# Patient Record
Sex: MALE | Race: WHITE | NOT HISPANIC OR LATINO | Employment: FULL TIME | URBAN - METROPOLITAN AREA
[De-identification: names, ages, dates, MRNs, and addresses within clinical notes are randomized per-mention and may not be internally consistent; named-entity substitution may affect disease eponyms.]

---

## 2017-01-24 RX ORDER — LISINOPRIL 2.5 MG/1
TABLET ORAL
Qty: 30 TABLET | Refills: 3 | Status: SHIPPED | OUTPATIENT
Start: 2017-01-24 | End: 2017-06-09 | Stop reason: SDUPTHER

## 2017-03-13 RX ORDER — PRASUGREL HYDROCHLORIDE 10 MG/1
TABLET, COATED ORAL
Qty: 30 TABLET | Refills: 6 | Status: SHIPPED | OUTPATIENT
Start: 2017-03-13 | End: 2017-09-29 | Stop reason: SDUPTHER

## 2017-04-18 RX ORDER — METOPROLOL TARTRATE 25 MG/1
TABLET, FILM COATED ORAL
Qty: 60 TABLET | Refills: 6 | Status: SHIPPED | OUTPATIENT
Start: 2017-04-18 | End: 2017-10-26 | Stop reason: SDUPTHER

## 2017-06-09 RX ORDER — SIMVASTATIN 40 MG/1
TABLET, FILM COATED ORAL
Qty: 30 TABLET | Refills: 6 | Status: SHIPPED | OUTPATIENT
Start: 2017-06-09 | End: 2018-01-02 | Stop reason: SDUPTHER

## 2017-06-09 RX ORDER — LISINOPRIL 2.5 MG/1
TABLET ORAL
Qty: 30 TABLET | Refills: 6 | Status: SHIPPED | OUTPATIENT
Start: 2017-06-09 | End: 2018-01-02 | Stop reason: SDUPTHER

## 2017-09-29 RX ORDER — PRASUGREL HYDROCHLORIDE 10 MG/1
TABLET, COATED ORAL
Qty: 30 TABLET | Refills: 6 | Status: SHIPPED | OUTPATIENT
Start: 2017-09-29 | End: 2018-05-10 | Stop reason: SDUPTHER

## 2017-10-26 RX ORDER — METOPROLOL TARTRATE 25 MG/1
TABLET, FILM COATED ORAL
Qty: 60 TABLET | Refills: 6 | Status: SHIPPED | OUTPATIENT
Start: 2017-10-26 | End: 2018-05-16 | Stop reason: SDUPTHER

## 2018-01-02 RX ORDER — SIMVASTATIN 40 MG/1
TABLET, FILM COATED ORAL
Qty: 30 TABLET | Refills: 6 | Status: SHIPPED | OUTPATIENT
Start: 2018-01-02 | End: 2018-10-06 | Stop reason: SDUPTHER

## 2018-01-02 RX ORDER — LISINOPRIL 2.5 MG/1
TABLET ORAL
Qty: 30 TABLET | Refills: 6 | Status: SHIPPED | OUTPATIENT
Start: 2018-01-02 | End: 2018-10-31 | Stop reason: SDUPTHER

## 2018-04-29 ENCOUNTER — HOSPITAL ENCOUNTER (EMERGENCY)
Facility: HOSPITAL | Age: 53
Discharge: HOME | End: 2018-04-30
Attending: EMERGENCY MEDICINE
Payer: COMMERCIAL

## 2018-04-29 DIAGNOSIS — Z91.148 NONCOMPLIANCE WITH MEDICATION REGIMEN: ICD-10-CM

## 2018-04-29 DIAGNOSIS — I48.0 PAROXYSMAL ATRIAL FIBRILLATION WITH RVR: ICD-10-CM

## 2018-04-29 DIAGNOSIS — I48.0 EPISODIC ATRIAL FIBRILLATION: ICD-10-CM

## 2018-04-29 DIAGNOSIS — R00.2 PALPITATIONS: ICD-10-CM

## 2018-04-29 DIAGNOSIS — I48.91 FIRST DETECTED EPISODE OF ATRIAL FIBRILLATION: Primary | ICD-10-CM

## 2018-04-29 LAB
ALBUMIN SERPL BCP-MCNC: 4.6 G/DL
ALP SERPL-CCNC: 81 U/L
ALT SERPL W/O P-5'-P-CCNC: 26 U/L
ANION GAP SERPL CALC-SCNC: 12 MMOL/L
AST SERPL-CCNC: 24 U/L
BASOPHILS # BLD AUTO: 0.04 K/UL
BASOPHILS NFR BLD: 0.5 %
BILIRUB SERPL-MCNC: 0.8 MG/DL
BNP SERPL-MCNC: 23 PG/ML
BUN SERPL-MCNC: 18 MG/DL
CALCIUM SERPL-MCNC: 9.7 MG/DL
CHLORIDE SERPL-SCNC: 102 MMOL/L
CO2 SERPL-SCNC: 26 MMOL/L
CREAT SERPL-MCNC: 1 MG/DL
DIFFERENTIAL METHOD: ABNORMAL
EOSINOPHIL # BLD AUTO: 0.4 K/UL
EOSINOPHIL NFR BLD: 5.1 %
ERYTHROCYTE [DISTWIDTH] IN BLOOD BY AUTOMATED COUNT: 12.8 %
EST. GFR  (AFRICAN AMERICAN): >60 ML/MIN/1.73 M^2
EST. GFR  (NON AFRICAN AMERICAN): >60 ML/MIN/1.73 M^2
GLUCOSE SERPL-MCNC: 110 MG/DL
HCT VFR BLD AUTO: 44.8 %
HGB BLD-MCNC: 16.2 G/DL
LYMPHOCYTES # BLD AUTO: 3.5 K/UL
LYMPHOCYTES NFR BLD: 44.1 %
MCH RBC QN AUTO: 30.4 PG
MCHC RBC AUTO-ENTMCNC: 36.2 G/DL
MCV RBC AUTO: 84 FL
MONOCYTES # BLD AUTO: 0.6 K/UL
MONOCYTES NFR BLD: 6.8 %
NEUTROPHILS # BLD AUTO: 3.5 K/UL
NEUTROPHILS NFR BLD: 43.5 %
PLATELET # BLD AUTO: 207 K/UL
PMV BLD AUTO: 10.8 FL
POTASSIUM SERPL-SCNC: 3.8 MMOL/L
PROT SERPL-MCNC: 7.6 G/DL
RBC # BLD AUTO: 5.33 M/UL
SODIUM SERPL-SCNC: 140 MMOL/L
TROPONIN I SERPL DL<=0.01 NG/ML-MCNC: 0.01 NG/ML
WBC # BLD AUTO: 8.03 K/UL

## 2018-04-29 PROCEDURE — 84484 ASSAY OF TROPONIN QUANT: CPT

## 2018-04-29 PROCEDURE — 93005 ELECTROCARDIOGRAM TRACING: CPT

## 2018-04-29 PROCEDURE — 83880 ASSAY OF NATRIURETIC PEPTIDE: CPT

## 2018-04-29 PROCEDURE — 85025 COMPLETE CBC W/AUTO DIFF WBC: CPT

## 2018-04-29 PROCEDURE — 93010 ELECTROCARDIOGRAM REPORT: CPT | Mod: ,,, | Performed by: INTERNAL MEDICINE

## 2018-04-29 PROCEDURE — 99284 EMERGENCY DEPT VISIT MOD MDM: CPT | Mod: 25

## 2018-04-29 PROCEDURE — 25000003 PHARM REV CODE 250: Performed by: EMERGENCY MEDICINE

## 2018-04-29 PROCEDURE — 96374 THER/PROPH/DIAG INJ IV PUSH: CPT

## 2018-04-29 PROCEDURE — 80053 COMPREHEN METABOLIC PANEL: CPT

## 2018-04-29 RX ORDER — METOPROLOL TARTRATE 1 MG/ML
5 INJECTION, SOLUTION INTRAVENOUS
Status: COMPLETED | OUTPATIENT
Start: 2018-04-29 | End: 2018-04-29

## 2018-04-29 RX ORDER — ASPIRIN 325 MG
325 TABLET ORAL
Status: DISCONTINUED | OUTPATIENT
Start: 2018-04-29 | End: 2018-04-30 | Stop reason: HOSPADM

## 2018-04-29 RX ORDER — DILTIAZEM HYDROCHLORIDE 5 MG/ML
10 INJECTION INTRAVENOUS
Status: DISCONTINUED | OUTPATIENT
Start: 2018-04-29 | End: 2018-04-29

## 2018-04-29 RX ADMIN — METOPROLOL TARTRATE 5 MG: 5 INJECTION, SOLUTION INTRAVENOUS at 10:04

## 2018-04-30 VITALS
WEIGHT: 179.88 LBS | TEMPERATURE: 98 F | HEART RATE: 70 BPM | SYSTOLIC BLOOD PRESSURE: 106 MMHG | RESPIRATION RATE: 18 BRPM | HEIGHT: 66 IN | BODY MASS INDEX: 28.91 KG/M2 | OXYGEN SATURATION: 95 % | DIASTOLIC BLOOD PRESSURE: 59 MMHG

## 2018-04-30 PROBLEM — I48.0 PAROXYSMAL ATRIAL FIBRILLATION WITH RVR: Status: ACTIVE | Noted: 2018-04-30

## 2018-04-30 PROBLEM — I48.91: Status: ACTIVE | Noted: 2018-04-30

## 2018-04-30 PROBLEM — R00.2 PALPITATIONS: Status: ACTIVE | Noted: 2018-04-30

## 2018-04-30 PROBLEM — Z91.148 NONCOMPLIANCE WITH MEDICATION REGIMEN: Status: ACTIVE | Noted: 2018-04-30

## 2018-04-30 NOTE — ED PROVIDER NOTES
SCRIBE #1 NOTE: I, Alena Gupta, am scribing for, and in the presence of, Darian Arana Jr., MD. I have scribed the entire note.      History      Chief Complaint   Patient presents with    Palpitations       Review of patient's allergies indicates:  No Known Allergies     HPI   HPI    4/29/2018, 10:30 PM   History obtained from the patient      History of Present Illness: Solomon Joseph is a 52 y.o. male patient with PMHx of HTN, CAD, and MI who presents to the Emergency Department for palpitations which onset gradually PTA. Patient reports missing a dose of Metoprolol this morning. Symptoms are constant and moderate in severity. No mitigating or exacerbating factors reported. No associated sxs included. Prior tx includes aspirin x3. Patient denies any fever, chills, CP, SOB, diaphoresis, extremity weakness/numbness, leg swelling, dizziness, cough, N/V, and all other sxs at this time. Patient reports a couple episodes of palpitations over the last few months, but sxs only lasted about 5 seconds and then would resolve after coughing. Patient states he has not previously been diagnosed with Afib. Patient reports previously having stents placed by Dr. Lemus. No further complaints or concerns at this time.       Arrival mode: Personal vehicle      PCP: Primary Doctor No       Past Medical History:  Past Medical History:   Diagnosis Date    Coronary artery disease     Hypertension        Past Surgical History:  Past Surgical History:   Procedure Laterality Date    CORONARY STENT PLACEMENT           Family History:  Family History   Problem Relation Age of Onset    Heart disease Father        Social History:  Social History     Social History Main Topics    Smoking status: Former Smoker    Smokeless tobacco: Never Used    Alcohol use No    Drug use: No    Sexual activity: Unknown       ROS   Review of Systems   Constitutional: Negative for diaphoresis and fever.   HENT: Negative for sore throat.    Respiratory:  "Negative for cough and shortness of breath.    Cardiovascular: Positive for palpitations. Negative for chest pain and leg swelling.   Gastrointestinal: Negative for nausea and vomiting.   Genitourinary: Negative for dysuria.   Musculoskeletal: Negative for back pain.   Skin: Negative for rash.   Neurological: Negative for dizziness, weakness and numbness.   Hematological: Does not bruise/bleed easily.   All other systems reviewed and are negative.      Physical Exam      Initial Vitals [04/29/18 2220]   BP Pulse Resp Temp SpO2   (!) 180/85 (!) 141 18 98 °F (36.7 °C) 96 %      MAP       116.67          Physical Exam  Nursing Notes and Vital Signs Reviewed.  Constitutional: Patient is in no acute distress. Well-developed and well-nourished.  Head: Atraumatic. Normocephalic.  Eyes: PERRL. EOM intact. Conjunctivae are not pale. No scleral icterus.  ENT: Mucous membranes are moist. Oropharynx is clear and symmetric.    Neck: Supple. Full ROM. No lymphadenopathy.  Cardiovascular: Tachycardic. Irregularly irregular rhythm. No murmurs, rubs, or gallops. Distal pulses are 2+ and symmetric.  Pulmonary/Chest: No respiratory distress. Clear to auscultation bilaterally. No wheezing or rales.  Abdominal: Soft and non-distended.  There is no tenderness.  No rebound, guarding, or rigidity. Good bowel sounds.  Genitourinary: No CVA tenderness  Musculoskeletal: Moves all extremities. No obvious deformities. No edema. No calf tenderness.  Skin: Warm and dry.  Neurological:  Alert, awake, and appropriate.  Normal speech.  No acute focal neurological deficits are appreciated.  Psychiatric: Normal affect. Good eye contact. Appropriate in content.    ED Course    Procedures  ED Vital Signs:  Vitals:    04/29/18 2220 04/29/18 2239 04/29/18 2302 04/29/18 2309   BP: (!) 180/85  (!) 148/83    Pulse: (!) 141 (!) 145 93 80   Resp: 18  (!) 23    Temp: 98 °F (36.7 °C)      SpO2: 96%  97%    Weight: 81.6 kg (179 lb 14.4 oz)      Height: 5' 6" " (1.676 m)       04/29/18 2318 04/30/18 0017   BP: 129/66 (!) 106/59   Pulse: 80 70   Resp: (!) 23 18   Temp:     SpO2: 96% 95%   Weight:     Height:         Abnormal Lab Results:  Labs Reviewed   CBC W/ AUTO DIFFERENTIAL - Abnormal; Notable for the following:        Result Value    MCHC 36.2 (*)     All other components within normal limits   COMPREHENSIVE METABOLIC PANEL   TROPONIN I   B-TYPE NATRIURETIC PEPTIDE        All Lab Results:  Results for orders placed or performed during the hospital encounter of 04/29/18   CBC auto differential   Result Value Ref Range    WBC 8.03 3.90 - 12.70 K/uL    RBC 5.33 4.60 - 6.20 M/uL    Hemoglobin 16.2 14.0 - 18.0 g/dL    Hematocrit 44.8 40.0 - 54.0 %    MCV 84 82 - 98 fL    MCH 30.4 27.0 - 31.0 pg    MCHC 36.2 (H) 32.0 - 36.0 g/dL    RDW 12.8 11.5 - 14.5 %    Platelets 207 150 - 350 K/uL    MPV 10.8 9.2 - 12.9 fL    Gran # (ANC) 3.5 1.8 - 7.7 K/uL    Lymph # 3.5 1.0 - 4.8 K/uL    Mono # 0.6 0.3 - 1.0 K/uL    Eos # 0.4 0.0 - 0.5 K/uL    Baso # 0.04 0.00 - 0.20 K/uL    Gran% 43.5 38.0 - 73.0 %    Lymph% 44.1 18.0 - 48.0 %    Mono% 6.8 4.0 - 15.0 %    Eosinophil% 5.1 0.0 - 8.0 %    Basophil% 0.5 0.0 - 1.9 %    Differential Method Automated    Comprehensive metabolic panel   Result Value Ref Range    Sodium 140 136 - 145 mmol/L    Potassium 3.8 3.5 - 5.1 mmol/L    Chloride 102 95 - 110 mmol/L    CO2 26 23 - 29 mmol/L    Glucose 110 70 - 110 mg/dL    BUN, Bld 18 6 - 20 mg/dL    Creatinine 1.0 0.5 - 1.4 mg/dL    Calcium 9.7 8.7 - 10.5 mg/dL    Total Protein 7.6 6.0 - 8.4 g/dL    Albumin 4.6 3.5 - 5.2 g/dL    Total Bilirubin 0.8 0.1 - 1.0 mg/dL    Alkaline Phosphatase 81 55 - 135 U/L    AST 24 10 - 40 U/L    ALT 26 10 - 44 U/L    Anion Gap 12 8 - 16 mmol/L    eGFR if African American >60 >60 mL/min/1.73 m^2    eGFR if non African American >60 >60 mL/min/1.73 m^2   Troponin I #1   Result Value Ref Range    Troponin I 0.013 0.000 - 0.026 ng/mL   B-Type natriuretic peptide (BNP)    Result Value Ref Range    BNP 23 0 - 99 pg/mL       Imaging Results:  Imaging Results          X-Ray Chest AP Portable (Final result)  Result time 04/29/18 22:59:07    Final result by Theresa De La Garza MD (Timothy) (04/29/18 22:59:07)                 Impression:     Normal sized heart.Clear lungs.         Electronically signed by: THERESA DE LA GARZA MD  Date:     04/29/18  Time:    22:59              Narrative:    Chest, 1 view.    Clinical History: Cardiac palpitations                             The EKG was ordered, reviewed, and independently interpreted by the ED provider.  Interpretation time: 2227  Rate: 151 BPM  Rhythm: atrial fibrillation with rapid ventricular response  Interpretation: Septal infarct, age undetermined. Abnormal ECG. No STEMI.    The EKG was ordered, reviewed, and independently interpreted by the ED provider.  Interpretation time: 2300  Rate: 90 BPM  Rhythm: normal sinus rhythm  Interpretation: Normal ECG. No STEMI.         The Emergency Provider reviewed the vital signs and test results, which are outlined above.    ED Discussion     11:05 PM: Per nurse, patient spontaneously cardioverted.  Will get repeat EKG.    11:49 PM: Re-evaluated pt. Pt is resting comfortably and is in no acute distress.  D/w pt all pertinent results. D/w pt any concerns expressed at this time. Answered all questions. Pt expresses understanding at this time.    11:57 PM: Dr. Arana discussed the pt's case with Dr. Fay (Cardiology) who recommends agrees with plan of treatment. Dr. Fay recommends continue Metoprolol, aspirin, as well as other prescribed medications, and agrees with outpatient f/u in clinic.    12:01 AM: Reassessed pt at this time. Discussed with pt all pertinent ED information and results. Discussed pt dx and plan of tx. Gave pt all f/u and return to the ED instructions. All questions and concerns were addressed at this time. Pt expresses understanding of information and instructions, and is  comfortable with plan to discharge. Pt is stable for discharge.    I discussed with patient and/or family/caretaker that evaluation in the ED does not suggest any emergent or life threatening medical conditions requiring immediate intervention beyond what was provided in the ED, and I believe patient is safe for discharge.  Regardless, an unremarkable evaluation in the ED does not preclude the development or presence of a serious of life threatening condition. As such, patient was instructed to return immediately for any worsening or change in current symptoms.    Regarding ARRHYTHMIAS, I discussed causes of arrhythmias with patient including: blood chemistry imbalances; cardiomyopathy; heart failure; overactive thyroid gland; previous heart attack; heart disease; and the use of certain medications or substances (amphetamines, caffeine, cocaine, beta blockers, psychotropics, and sympathomimetics).  Patient educated on various symptoms, if present, such as chest pain, fainting, palpitations, light-headedness, dizziness, SOB, and diaphoresis.  Informed patient of possible complications (angina, MI, heart failure, stroke, and sudden death) associated with condition and importance on complying with medical plan and medication therapy. Patient advised to call primary care provider or return to ED if they develop any of the symptoms of a possible arrhythmia or if any symptoms associated with arrhythmia worsen or do not improve with treatment. For prevention, patient educated on steps to prevent coronary artery disease such as: eating a well-balanced, low-fat diet; exercising regularly; not smoking; drinking in moderation; and taking all medications as prescribed.    Regarding PALPITATIONS, I explained to patient things that may cause or worsen palpitations, such as: anxiety, stress, or lack of sleep; exercise; pregnancy; certain medical conditions (thyroid problems, low blood sugar, breathing problems, fever, or anemia;  dehydration; blood loss; shock; heart disease; medicines (diet pills, herbal supplements, amphetamines); cocaine; caffeine; and nicotine.  Advised patient to take medications as prescribed, always keep current list of medications on person, eat heart healthy meals, exercise regularly, and maintain/obtain healthy weight. I instructed patient to report to emergency department if they experience any dizziness, confusion, light-headedness, dyspnea, syncope, or chest discomfort.     ED Medication(s):  Medications   metoprolol injection 5 mg (5 mg Intravenous Given 4/29/18 2244)       Discharge Medication List as of 4/30/2018 12:06 AM          Follow-up Information     [facility]. Schedule an appointment as soon as possible for a visit in 1 week.    Specialty:  Internal Medicine  Contact information:  [address] [facility] [id number] [phone]  Additional information:  (off [employer]) 1st floor           [facility] Card[facility]. Schedule an appointment as soon as possible for a visit in 1 week.    Specialty:  Cardiology  Contact information:  [address] [facility] [id number] [phone]  Additional information:  (off [employer]) 3rd floor           [facility] - .    Specialty:  Emergency Medicine  Why:  As needed, If symptoms worsen  Contact information:  [address] [facility] [address] [phone]                   Medical Decision Making    Medical Decision Making:   Clinical Tests:   Lab Tests: Ordered and Reviewed  Radiological Study: Ordered and Reviewed  Medical Tests: Ordered and Reviewed           Scribe Attestation:   Scribe #1: I performed the above scribed service and the documentation accurately describes the services I performed. I attest to the accuracy of the note.    Attending:   Physician Attestation Statement for Scribe #1: I, Darian Arana Jr., MD, personally performed the services described in this  documentation, as scribed by Alena Gupta, in my presence, and it is both accurate and complete.          Clinical Impression       ICD-10-CM ICD-9-CM   1. First detected episode of atrial fibrillation I48.91 427.31   2. Palpitations R00.2 785.1   3. Episodic atrial fibrillation I48.0 427.31   4. Paroxysmal atrial fibrillation with RVR I48.0 427.31   5. Noncompliance with medication regimen Z91.14 V15.81       Disposition:   Disposition: Discharged  Condition: Stable         Darian Arana Jr., MD  05/01/18 0057

## 2018-04-30 NOTE — ED NOTES
Patient in Normal sinus rhythm on monitor. Held IV metoprolol. Patient stated he took 3 aspirin before leaving home to come to the er. Dr Arana notified.

## 2018-04-30 NOTE — DISCHARGE INSTRUCTIONS
Regarding ARRHYTHMIAS, I discussed causes of arrhythmias with patient including: blood chemistry imbalances; cardiomyopathy; heart failure; overactive thyroid gland; previous heart attack; heart disease; and the use of certain medications or substances (amphetamines, caffeine, cocaine, beta blockers, psychotropic?s, and sympathomimetics).  Patient educated on various symptoms, if present, such as chest pain, fainting, palpitations, light-headedness, dizziness, SOB, and diaphoresis.  Informed patient of possible complications (angina, MI, heart failure, stroke, and sudden death) associated with condition and importance on complying with medical plan and medication therapy. Patient advised to call primary care provider or return to ED if they develop any of the symptoms of a possible arrhythmia or if any symptoms associated with arrhythmia worsen or do not improve with treatment. For prevention, patient educated on steps to prevent coronary artery disease such as: eating a well-balanced, low-fat diet; exercising regularly; not smoking; drinking in moderation; and taking all medications as prescribed.

## 2018-05-11 RX ORDER — PRASUGREL 10 MG/1
TABLET, FILM COATED ORAL
Qty: 30 TABLET | Refills: 6 | Status: SHIPPED | OUTPATIENT
Start: 2018-05-11 | End: 2019-01-16 | Stop reason: SDUPTHER

## 2018-05-16 RX ORDER — METOPROLOL TARTRATE 25 MG/1
TABLET, FILM COATED ORAL
Qty: 60 TABLET | Refills: 0 | Status: SHIPPED | OUTPATIENT
Start: 2018-05-16 | End: 2018-09-13 | Stop reason: SDUPTHER

## 2018-09-13 RX ORDER — METOPROLOL TARTRATE 25 MG/1
TABLET, FILM COATED ORAL
Qty: 60 TABLET | Refills: 6 | Status: SHIPPED | OUTPATIENT
Start: 2018-09-13 | End: 2019-08-18 | Stop reason: SDUPTHER

## 2018-10-06 RX ORDER — SIMVASTATIN 40 MG/1
TABLET, FILM COATED ORAL
Qty: 30 TABLET | Refills: 6 | Status: SHIPPED | OUTPATIENT
Start: 2018-10-06 | End: 2019-10-28 | Stop reason: SDUPTHER

## 2018-10-31 RX ORDER — LISINOPRIL 2.5 MG/1
TABLET ORAL
Qty: 30 TABLET | Refills: 6 | Status: SHIPPED | OUTPATIENT
Start: 2018-10-31 | End: 2019-05-28 | Stop reason: SDUPTHER

## 2019-01-16 RX ORDER — PRASUGREL 10 MG/1
TABLET, FILM COATED ORAL
Qty: 30 TABLET | Refills: 6 | Status: ON HOLD | OUTPATIENT
Start: 2019-01-16 | End: 2023-04-05

## 2019-01-22 LAB — CRC RECOMMENDATION EXT: NORMAL

## 2019-05-28 RX ORDER — LISINOPRIL 2.5 MG/1
TABLET ORAL
Qty: 30 TABLET | Refills: 0 | Status: SHIPPED | OUTPATIENT
Start: 2019-05-28 | End: 2019-07-06 | Stop reason: SDUPTHER

## 2019-07-06 RX ORDER — LISINOPRIL 2.5 MG/1
TABLET ORAL
Qty: 30 TABLET | Refills: 0 | Status: ON HOLD | OUTPATIENT
Start: 2019-07-06 | End: 2023-04-05

## 2019-08-18 RX ORDER — METOPROLOL TARTRATE 25 MG/1
TABLET, FILM COATED ORAL
Qty: 60 TABLET | Refills: 6 | Status: SHIPPED | OUTPATIENT
Start: 2019-08-18 | End: 2023-04-04 | Stop reason: CLARIF

## 2019-10-28 RX ORDER — SIMVASTATIN 40 MG/1
TABLET, FILM COATED ORAL
Qty: 30 TABLET | Refills: 6 | Status: ON HOLD | OUTPATIENT
Start: 2019-10-28 | End: 2023-04-05 | Stop reason: HOSPADM

## 2020-01-10 ENCOUNTER — TELEPHONE (OUTPATIENT)
Dept: CARDIOLOGY | Facility: CLINIC | Age: 55
End: 2020-01-10

## 2020-01-10 NOTE — TELEPHONE ENCOUNTER
----- Message from Koko Seay sent at 1/10/2020 10:23 AM CST -----  Contact: Pt's wife-Tia Joseph   Type:  RX Refill Request    Who Called: Pt's wife-Tia Joseph   Refill or New Rx: refill   RX Name and Strength: prasugrel (EFFIENT) 10 mg  How is the patient currently taking it? (ex. 1XDay): once daily   Is this a 30 day or 90 day RX: 30  Preferred Pharmacy with phone number:   [id number] - [place], [place] - [address] AT [facility] OF [address]  [address] [facility] [address]  Phone: [phone] Fax: [phone]  Local or Mail Order: local   Ordering Provider:seth   Would the patient rather a call back or a response via [facility]? Call back   Best Call Back Number: [phone]  Additional Information: n/a

## 2020-01-10 NOTE — TELEPHONE ENCOUNTER
----- Message from Koko Seay sent at 1/10/2020 10:27 AM CST -----  Contact: Pt's wife-   Type:  Sooner Apoointment Request    Caller is requesting a sooner appointment.  Caller declined first available appointment listed below.  Caller will not accept being placed on the waitlist and is requesting a message be sent to doctor.  Name of Caller:Pt's wife-Mrs. Joseph   When is the first available appointment? n/a  Symptoms: HOSP FU   Would the patient rather a call back or a response via [facility]? Call back   Best Call Back Number: [phone]  Additional Information: n/a

## 2020-04-16 DIAGNOSIS — R00.2 PALPITATIONS: Primary | ICD-10-CM

## 2020-04-16 RX ORDER — METOPROLOL TARTRATE 25 MG/1
25 TABLET, FILM COATED ORAL 2 TIMES DAILY
Qty: 60 TABLET | Refills: 6 | Status: SHIPPED | OUTPATIENT
Start: 2020-04-16 | End: 2021-04-22

## 2023-04-04 ENCOUNTER — HOSPITAL ENCOUNTER (INPATIENT)
Facility: HOSPITAL | Age: 58
LOS: 1 days | Discharge: HOME | DRG: 247 | End: 2023-04-05
Attending: EMERGENCY MEDICINE | Admitting: INTERNAL MEDICINE
Payer: COMMERCIAL

## 2023-04-04 DIAGNOSIS — R07.9 CHEST PAIN, UNSPECIFIED TYPE: ICD-10-CM

## 2023-04-04 DIAGNOSIS — I25.10 CORONARY ARTERY DISEASE, UNSPECIFIED VESSEL OR LESION TYPE, UNSPECIFIED WHETHER ANGINA PRESENT, UNSPECIFIED WHETHER NATIVE OR TRANSPLANTED HEART: ICD-10-CM

## 2023-04-04 DIAGNOSIS — I25.10 CAD (CORONARY ARTERY DISEASE): ICD-10-CM

## 2023-04-04 DIAGNOSIS — I21.4 NSTEMI (NON-ST ELEVATED MYOCARDIAL INFARCTION): Primary | ICD-10-CM

## 2023-04-04 DIAGNOSIS — R79.89 ELEVATED TROPONIN: ICD-10-CM

## 2023-04-04 DIAGNOSIS — R07.9 CHEST PAIN: ICD-10-CM

## 2023-04-04 PROBLEM — R03.0 ELEVATED BLOOD PRESSURE READING: Status: ACTIVE | Noted: 2023-04-04

## 2023-04-04 LAB
ALBUMIN SERPL BCP-MCNC: 4 G/DL (ref 3.5–5.2)
ALBUMIN SERPL BCP-MCNC: 4.1 G/DL (ref 3.5–5.2)
ALP SERPL-CCNC: 69 U/L (ref 55–135)
ALP SERPL-CCNC: 69 U/L (ref 55–135)
ALT SERPL W/O P-5'-P-CCNC: 25 U/L (ref 10–44)
ALT SERPL W/O P-5'-P-CCNC: 25 U/L (ref 10–44)
ANION GAP SERPL CALC-SCNC: 10 MMOL/L (ref 8–16)
ANION GAP SERPL CALC-SCNC: 11 MMOL/L (ref 8–16)
AORTIC ROOT ANNULUS: 3.34 CM
APTT PPP: 28.5 SEC (ref 21–32)
ASCENDING AORTA: 3.58 CM
AST SERPL-CCNC: 21 U/L (ref 10–40)
AST SERPL-CCNC: 23 U/L (ref 10–40)
AV INDEX (PROSTH): 0.87
AV MEAN GRADIENT: 2 MMHG
AV PEAK GRADIENT: 4 MMHG
AV VALVE AREA: 4.1 CM2
AV VELOCITY RATIO: 0.86
BASOPHILS # BLD AUTO: 0.05 K/UL (ref 0–0.2)
BASOPHILS # BLD AUTO: 0.06 K/UL (ref 0–0.2)
BASOPHILS NFR BLD: 0.9 % (ref 0–1.9)
BASOPHILS NFR BLD: 1.2 % (ref 0–1.9)
BILIRUB SERPL-MCNC: 0.3 MG/DL (ref 0.1–1)
BILIRUB SERPL-MCNC: 0.3 MG/DL (ref 0.1–1)
BNP SERPL-MCNC: 32 PG/ML (ref 0–99)
BSA FOR ECHO PROCEDURE: 1.94 M2
BUN SERPL-MCNC: 15 MG/DL (ref 6–20)
BUN SERPL-MCNC: 17 MG/DL (ref 6–20)
CALCIUM SERPL-MCNC: 8.6 MG/DL (ref 8.7–10.5)
CALCIUM SERPL-MCNC: 8.7 MG/DL (ref 8.7–10.5)
CATH EF QUANTITATIVE: 60 %
CHLORIDE SERPL-SCNC: 105 MMOL/L (ref 95–110)
CHLORIDE SERPL-SCNC: 106 MMOL/L (ref 95–110)
CK SERPL-CCNC: 132 U/L (ref 20–200)
CO2 SERPL-SCNC: 22 MMOL/L (ref 23–29)
CO2 SERPL-SCNC: 23 MMOL/L (ref 23–29)
CREAT SERPL-MCNC: 0.8 MG/DL (ref 0.5–1.4)
CREAT SERPL-MCNC: 0.8 MG/DL (ref 0.5–1.4)
CV ECHO LV RWT: 0.49 CM
DIFFERENTIAL METHOD: NORMAL
DIFFERENTIAL METHOD: NORMAL
DOP CALC AO PEAK VEL: 0.94 M/S
DOP CALC AO VTI: 23.2 CM
DOP CALC LVOT AREA: 4.7 CM2
DOP CALC LVOT DIAMETER: 2.45 CM
DOP CALC LVOT PEAK VEL: 0.81 M/S
DOP CALC LVOT STROKE VOLUME: 95.18 CM3
DOP CALC RVOT PEAK VEL: 0.42 M/S
DOP CALC RVOT VTI: 11.6 CM
DOP CALCLVOT PEAK VEL VTI: 20.2 CM
E WAVE DECELERATION TIME: 140.93 MSEC
E/A RATIO: 1.22
E/E' RATIO: 6.7 M/S
ECHO LV POSTERIOR WALL: 1.12 CM (ref 0.6–1.1)
EJECTION FRACTION: 60 %
EOSINOPHIL # BLD AUTO: 0.3 K/UL (ref 0–0.5)
EOSINOPHIL # BLD AUTO: 0.3 K/UL (ref 0–0.5)
EOSINOPHIL NFR BLD: 4.8 % (ref 0–8)
EOSINOPHIL NFR BLD: 5.6 % (ref 0–8)
ERYTHROCYTE [DISTWIDTH] IN BLOOD BY AUTOMATED COUNT: 12 % (ref 11.5–14.5)
ERYTHROCYTE [DISTWIDTH] IN BLOOD BY AUTOMATED COUNT: 12 % (ref 11.5–14.5)
EST. GFR  (NO RACE VARIABLE): >60 ML/MIN/1.73 M^2
EST. GFR  (NO RACE VARIABLE): >60 ML/MIN/1.73 M^2
FRACTIONAL SHORTENING: 31 % (ref 28–44)
GLUCOSE SERPL-MCNC: 95 MG/DL (ref 70–110)
GLUCOSE SERPL-MCNC: 97 MG/DL (ref 70–110)
HCT VFR BLD AUTO: 43.2 % (ref 40–54)
HCT VFR BLD AUTO: 43.7 % (ref 40–54)
HCV AB SERPL QL IA: NEGATIVE
HEP C VIRUS HOLD SPECIMEN: NORMAL
HGB BLD-MCNC: 15 G/DL (ref 14–18)
HGB BLD-MCNC: 15.2 G/DL (ref 14–18)
HIV 1+2 AB+HIV1 P24 AG SERPL QL IA: NEGATIVE
IMM GRANULOCYTES # BLD AUTO: 0.01 K/UL (ref 0–0.04)
IMM GRANULOCYTES # BLD AUTO: 0.02 K/UL (ref 0–0.04)
IMM GRANULOCYTES NFR BLD AUTO: 0.2 % (ref 0–0.5)
IMM GRANULOCYTES NFR BLD AUTO: 0.4 % (ref 0–0.5)
INR PPP: 1 (ref 0.8–1.2)
INTERVENTRICULAR SEPTUM: 1.09 CM (ref 0.6–1.1)
IVC DIAMETER: 1.33 CM
IVRT: 114.18 MSEC
LA MAJOR: 6.23 CM
LA MINOR: 4.72 CM
LA WIDTH: 3.8 CM
LEFT ATRIUM SIZE: 3.19 CM
LEFT ATRIUM VOLUME INDEX: 28.8 ML/M2
LEFT ATRIUM VOLUME: 55.34 CM3
LEFT INTERNAL DIMENSION IN SYSTOLE: 3.16 CM (ref 2.1–4)
LEFT VENTRICLE DIASTOLIC VOLUME INDEX: 49.37 ML/M2
LEFT VENTRICLE DIASTOLIC VOLUME: 94.79 ML
LEFT VENTRICLE MASS INDEX: 93 G/M2
LEFT VENTRICLE SYSTOLIC VOLUME INDEX: 20.8 ML/M2
LEFT VENTRICLE SYSTOLIC VOLUME: 39.87 ML
LEFT VENTRICULAR INTERNAL DIMENSION IN DIASTOLE: 4.55 CM (ref 3.5–6)
LEFT VENTRICULAR MASS: 179.25 G
LV LATERAL E/E' RATIO: 5.92 M/S
LV SEPTAL E/E' RATIO: 7.7 M/S
LVOT MG: 1.39 MMHG
LVOT MV: 0.56 CM/S
LYMPHOCYTES # BLD AUTO: 2 K/UL (ref 1–4.8)
LYMPHOCYTES # BLD AUTO: 2.1 K/UL (ref 1–4.8)
LYMPHOCYTES NFR BLD: 35.8 % (ref 18–48)
LYMPHOCYTES NFR BLD: 39.9 % (ref 18–48)
MAGNESIUM SERPL-MCNC: 1.9 MG/DL (ref 1.6–2.6)
MCH RBC QN AUTO: 29.6 PG (ref 27–31)
MCH RBC QN AUTO: 29.7 PG (ref 27–31)
MCHC RBC AUTO-ENTMCNC: 34.7 G/DL (ref 32–36)
MCHC RBC AUTO-ENTMCNC: 34.8 G/DL (ref 32–36)
MCV RBC AUTO: 85 FL (ref 82–98)
MCV RBC AUTO: 86 FL (ref 82–98)
MONOCYTES # BLD AUTO: 0.4 K/UL (ref 0.3–1)
MONOCYTES # BLD AUTO: 0.5 K/UL (ref 0.3–1)
MONOCYTES NFR BLD: 7.5 % (ref 4–15)
MONOCYTES NFR BLD: 8.9 % (ref 4–15)
MV PEAK A VEL: 0.63 M/S
MV PEAK E VEL: 0.77 M/S
NEUTROPHILS # BLD AUTO: 2.3 K/UL (ref 1.8–7.7)
NEUTROPHILS # BLD AUTO: 2.8 K/UL (ref 1.8–7.7)
NEUTROPHILS NFR BLD: 44.2 % (ref 38–73)
NEUTROPHILS NFR BLD: 50.6 % (ref 38–73)
NRBC BLD-RTO: 0 /100 WBC
NRBC BLD-RTO: 0 /100 WBC
PHOSPHATE SERPL-MCNC: 2.4 MG/DL (ref 2.7–4.5)
PISA MRMAX VEL: 5.35 M/S
PISA TR MAX VEL: 2.12 M/S
PLATELET # BLD AUTO: 194 K/UL (ref 150–450)
PLATELET # BLD AUTO: 199 K/UL (ref 150–450)
PMV BLD AUTO: 10.8 FL (ref 9.2–12.9)
PMV BLD AUTO: 10.9 FL (ref 9.2–12.9)
POCT GLUCOSE: 99 MG/DL (ref 70–110)
POTASSIUM SERPL-SCNC: 3.8 MMOL/L (ref 3.5–5.1)
POTASSIUM SERPL-SCNC: 4 MMOL/L (ref 3.5–5.1)
PROT SERPL-MCNC: 6.8 G/DL (ref 6–8.4)
PROT SERPL-MCNC: 6.9 G/DL (ref 6–8.4)
PROTHROMBIN TIME: 10.6 SEC (ref 9–12.5)
PV MEAN GRADIENT: 0.45 MMHG
RA MAJOR: 4.42 CM
RA PRESSURE: 3 MMHG
RBC # BLD AUTO: 5.05 M/UL (ref 4.6–6.2)
RBC # BLD AUTO: 5.13 M/UL (ref 4.6–6.2)
SODIUM SERPL-SCNC: 138 MMOL/L (ref 136–145)
SODIUM SERPL-SCNC: 139 MMOL/L (ref 136–145)
STJ: 3.4 CM
T4 FREE SERPL-MCNC: 0.97 NG/DL (ref 0.71–1.51)
TDI LATERAL: 0.13 M/S
TDI SEPTAL: 0.1 M/S
TDI: 0.12 M/S
TR MAX PG: 18 MMHG
TRICUSPID ANNULAR PLANE SYSTOLIC EXCURSION: 2 CM
TROPONIN I SERPL DL<=0.01 NG/ML-MCNC: 0.1 NG/ML (ref 0–0.03)
TROPONIN I SERPL DL<=0.01 NG/ML-MCNC: 0.11 NG/ML (ref 0–0.03)
TROPONIN I SERPL DL<=0.01 NG/ML-MCNC: 0.14 NG/ML (ref 0–0.03)
TSH SERPL DL<=0.005 MIU/L-ACNC: 1.54 UIU/ML (ref 0.4–4)
TV REST PULMONARY ARTERY PRESSURE: 21 MMHG
WBC # BLD AUTO: 5.19 K/UL (ref 3.9–12.7)
WBC # BLD AUTO: 5.61 K/UL (ref 3.9–12.7)

## 2023-04-04 PROCEDURE — 99204 OFFICE O/P NEW MOD 45 MIN: CPT | Mod: 25,,, | Performed by: INTERNAL MEDICINE

## 2023-04-04 PROCEDURE — 85610 PROTHROMBIN TIME: CPT | Performed by: EMERGENCY MEDICINE

## 2023-04-04 PROCEDURE — 93571 PR HEART FLOW RESERV MEASURE,INIT VESSL: ICD-10-PCS | Mod: 26,LD,, | Performed by: INTERNAL MEDICINE

## 2023-04-04 PROCEDURE — C9600 PERC DRUG-EL COR STENT SING: HCPCS | Mod: LC | Performed by: INTERNAL MEDICINE

## 2023-04-04 PROCEDURE — 84484 ASSAY OF TROPONIN QUANT: CPT | Mod: 91 | Performed by: EMERGENCY MEDICINE

## 2023-04-04 PROCEDURE — C1769 GUIDE WIRE: HCPCS | Performed by: INTERNAL MEDICINE

## 2023-04-04 PROCEDURE — 87389 HIV-1 AG W/HIV-1&-2 AB AG IA: CPT | Performed by: EMERGENCY MEDICINE

## 2023-04-04 PROCEDURE — 83735 ASSAY OF MAGNESIUM: CPT | Performed by: NURSE PRACTITIONER

## 2023-04-04 PROCEDURE — 84484 ASSAY OF TROPONIN QUANT: CPT | Mod: 91 | Performed by: INTERNAL MEDICINE

## 2023-04-04 PROCEDURE — 85347 COAGULATION TIME ACTIVATED: CPT | Performed by: INTERNAL MEDICINE

## 2023-04-04 PROCEDURE — 84439 ASSAY OF FREE THYROXINE: CPT | Performed by: NURSE PRACTITIONER

## 2023-04-04 PROCEDURE — 92978 PR IVUS, CORONARY, 1ST VESSEL: ICD-10-PCS | Mod: 26,LD,, | Performed by: INTERNAL MEDICINE

## 2023-04-04 PROCEDURE — 92978 ENDOLUMINL IVUS OCT C 1ST: CPT | Mod: 26,LD,, | Performed by: INTERNAL MEDICINE

## 2023-04-04 PROCEDURE — 94761 N-INVAS EAR/PLS OXIMETRY MLT: CPT

## 2023-04-04 PROCEDURE — 92928 PRQ TCAT PLMT NTRAC ST 1 LES: CPT | Mod: LC,,, | Performed by: INTERNAL MEDICINE

## 2023-04-04 PROCEDURE — 99285 EMERGENCY DEPT VISIT HI MDM: CPT | Mod: 25

## 2023-04-04 PROCEDURE — 80053 COMPREHEN METABOLIC PANEL: CPT | Performed by: NURSE PRACTITIONER

## 2023-04-04 PROCEDURE — 25000003 PHARM REV CODE 250: Performed by: NURSE PRACTITIONER

## 2023-04-04 PROCEDURE — 25000003 PHARM REV CODE 250: Performed by: INTERNAL MEDICINE

## 2023-04-04 PROCEDURE — 96365 THER/PROPH/DIAG IV INF INIT: CPT

## 2023-04-04 PROCEDURE — G0378 HOSPITAL OBSERVATION PER HR: HCPCS

## 2023-04-04 PROCEDURE — 96366 THER/PROPH/DIAG IV INF ADDON: CPT

## 2023-04-04 PROCEDURE — 93458 L HRT ARTERY/VENTRICLE ANGIO: CPT | Performed by: INTERNAL MEDICINE

## 2023-04-04 PROCEDURE — 93010 ELECTROCARDIOGRAM REPORT: CPT | Mod: 76,,, | Performed by: INTERNAL MEDICINE

## 2023-04-04 PROCEDURE — 99204 PR OFFICE/OUTPT VISIT, NEW, LEVL IV, 45-59 MIN: ICD-10-PCS | Mod: 25,,, | Performed by: INTERNAL MEDICINE

## 2023-04-04 PROCEDURE — 84443 ASSAY THYROID STIM HORMONE: CPT | Performed by: NURSE PRACTITIONER

## 2023-04-04 PROCEDURE — 63600175 PHARM REV CODE 636 W HCPCS: Performed by: INTERNAL MEDICINE

## 2023-04-04 PROCEDURE — 63600175 PHARM REV CODE 636 W HCPCS

## 2023-04-04 PROCEDURE — 99152 MOD SED SAME PHYS/QHP 5/>YRS: CPT | Performed by: INTERNAL MEDICINE

## 2023-04-04 PROCEDURE — 92978 ENDOLUMINL IVUS OCT C 1ST: CPT | Mod: LD | Performed by: INTERNAL MEDICINE

## 2023-04-04 PROCEDURE — 96361 HYDRATE IV INFUSION ADD-ON: CPT

## 2023-04-04 PROCEDURE — 93458 PR CATH PLACE/CORON ANGIO, IMG SUPER/INTERP,W LEFT HEART VENTRICULOGRAPHY: ICD-10-PCS | Mod: 26,59,51, | Performed by: INTERNAL MEDICINE

## 2023-04-04 PROCEDURE — C1753 CATH, INTRAVAS ULTRASOUND: HCPCS | Performed by: INTERNAL MEDICINE

## 2023-04-04 PROCEDURE — 25000003 PHARM REV CODE 250

## 2023-04-04 PROCEDURE — 93010 ELECTROCARDIOGRAM REPORT: CPT | Mod: ,,, | Performed by: INTERNAL MEDICINE

## 2023-04-04 PROCEDURE — C1894 INTRO/SHEATH, NON-LASER: HCPCS | Performed by: INTERNAL MEDICINE

## 2023-04-04 PROCEDURE — 80053 COMPREHEN METABOLIC PANEL: CPT | Mod: 91 | Performed by: EMERGENCY MEDICINE

## 2023-04-04 PROCEDURE — 84100 ASSAY OF PHOSPHORUS: CPT | Performed by: NURSE PRACTITIONER

## 2023-04-04 PROCEDURE — 93010 EKG 12-LEAD: ICD-10-PCS | Mod: ,,, | Performed by: INTERNAL MEDICINE

## 2023-04-04 PROCEDURE — 63600175 PHARM REV CODE 636 W HCPCS: Performed by: EMERGENCY MEDICINE

## 2023-04-04 PROCEDURE — 93005 ELECTROCARDIOGRAM TRACING: CPT

## 2023-04-04 PROCEDURE — 93571 IV DOP VEL&/PRESS C FLO 1ST: CPT | Mod: 26,LD,, | Performed by: INTERNAL MEDICINE

## 2023-04-04 PROCEDURE — 93458 L HRT ARTERY/VENTRICLE ANGIO: CPT | Mod: 26,59,51, | Performed by: INTERNAL MEDICINE

## 2023-04-04 PROCEDURE — 36415 COLL VENOUS BLD VENIPUNCTURE: CPT | Performed by: INTERNAL MEDICINE

## 2023-04-04 PROCEDURE — 96376 TX/PRO/DX INJ SAME DRUG ADON: CPT

## 2023-04-04 PROCEDURE — 85730 THROMBOPLASTIN TIME PARTIAL: CPT | Performed by: EMERGENCY MEDICINE

## 2023-04-04 PROCEDURE — C1887 CATHETER, GUIDING: HCPCS | Performed by: INTERNAL MEDICINE

## 2023-04-04 PROCEDURE — 86803 HEPATITIS C AB TEST: CPT | Performed by: EMERGENCY MEDICINE

## 2023-04-04 PROCEDURE — 93571 IV DOP VEL&/PRESS C FLO 1ST: CPT | Mod: LD | Performed by: INTERNAL MEDICINE

## 2023-04-04 PROCEDURE — 99152 PR MOD CONSCIOUS SEDATION, SAME PHYS, 5+ YRS, FIRST 15 MIN: ICD-10-PCS | Mod: ,,, | Performed by: INTERNAL MEDICINE

## 2023-04-04 PROCEDURE — C1874 STENT, COATED/COV W/DEL SYS: HCPCS | Performed by: INTERNAL MEDICINE

## 2023-04-04 PROCEDURE — 85025 COMPLETE CBC W/AUTO DIFF WBC: CPT | Performed by: EMERGENCY MEDICINE

## 2023-04-04 PROCEDURE — 82550 ASSAY OF CK (CPK): CPT | Performed by: NURSE PRACTITIONER

## 2023-04-04 PROCEDURE — 92928 PR STENT: ICD-10-PCS | Mod: LC,,, | Performed by: INTERNAL MEDICINE

## 2023-04-04 PROCEDURE — 27201423 OPTIME MED/SURG SUP & DEVICES STERILE SUPPLY: Performed by: INTERNAL MEDICINE

## 2023-04-04 PROCEDURE — 83880 ASSAY OF NATRIURETIC PEPTIDE: CPT | Performed by: EMERGENCY MEDICINE

## 2023-04-04 PROCEDURE — 85025 COMPLETE CBC W/AUTO DIFF WBC: CPT | Mod: 91 | Performed by: NURSE PRACTITIONER

## 2023-04-04 PROCEDURE — C1725 CATH, TRANSLUMIN NON-LASER: HCPCS | Performed by: INTERNAL MEDICINE

## 2023-04-04 PROCEDURE — 36415 COLL VENOUS BLD VENIPUNCTURE: CPT | Performed by: NURSE PRACTITIONER

## 2023-04-04 PROCEDURE — 99152 MOD SED SAME PHYS/QHP 5/>YRS: CPT | Mod: ,,, | Performed by: INTERNAL MEDICINE

## 2023-04-04 PROCEDURE — 99153 MOD SED SAME PHYS/QHP EA: CPT | Performed by: INTERNAL MEDICINE

## 2023-04-04 PROCEDURE — C1760 CLOSURE DEV, VASC: HCPCS | Performed by: INTERNAL MEDICINE

## 2023-04-04 DEVICE — EVEROLIMUS-ELUTING PLATINUM CHROMIUM CORONARY STENT SYSTEM
Type: IMPLANTABLE DEVICE | Site: HEART | Status: FUNCTIONAL
Brand: SYNERGY™ XD

## 2023-04-04 DEVICE — ANGIO-SEAL VIP VASCULAR CLOSURE DEVICE
Type: IMPLANTABLE DEVICE | Site: GROIN | Status: FUNCTIONAL
Brand: ANGIO-SEAL

## 2023-04-04 RX ORDER — IPRATROPIUM BROMIDE AND ALBUTEROL SULFATE 2.5; .5 MG/3ML; MG/3ML
3 SOLUTION RESPIRATORY (INHALATION) EVERY 6 HOURS PRN
Status: DISCONTINUED | OUTPATIENT
Start: 2023-04-04 | End: 2023-04-05 | Stop reason: HOSPADM

## 2023-04-04 RX ORDER — ACETAMINOPHEN 325 MG/1
650 TABLET ORAL EVERY 4 HOURS PRN
Status: DISCONTINUED | OUTPATIENT
Start: 2023-04-04 | End: 2023-04-04 | Stop reason: SDUPTHER

## 2023-04-04 RX ORDER — HYDROCODONE BITARTRATE AND ACETAMINOPHEN 5; 325 MG/1; MG/1
1 TABLET ORAL EVERY 6 HOURS PRN
Status: DISCONTINUED | OUTPATIENT
Start: 2023-04-04 | End: 2023-04-05 | Stop reason: HOSPADM

## 2023-04-04 RX ORDER — FENTANYL CITRATE 50 UG/ML
INJECTION, SOLUTION INTRAMUSCULAR; INTRAVENOUS
Status: DISCONTINUED | OUTPATIENT
Start: 2023-04-04 | End: 2023-04-04

## 2023-04-04 RX ORDER — SODIUM CHLORIDE 9 MG/ML
INJECTION, SOLUTION INTRAVENOUS CONTINUOUS
Status: DISCONTINUED | OUTPATIENT
Start: 2023-04-04 | End: 2023-04-04

## 2023-04-04 RX ORDER — SODIUM,POTASSIUM PHOSPHATES 280-250MG
2 POWDER IN PACKET (EA) ORAL
Status: DISCONTINUED | OUTPATIENT
Start: 2023-04-04 | End: 2023-04-05 | Stop reason: HOSPADM

## 2023-04-04 RX ORDER — DIPHENHYDRAMINE HYDROCHLORIDE 50 MG/ML
INJECTION INTRAMUSCULAR; INTRAVENOUS
Status: DISCONTINUED | OUTPATIENT
Start: 2023-04-04 | End: 2023-04-04

## 2023-04-04 RX ORDER — LIDOCAINE HYDROCHLORIDE 20 MG/ML
INJECTION, SOLUTION EPIDURAL; INFILTRATION; INTRACAUDAL; PERINEURAL
Status: DISCONTINUED | OUTPATIENT
Start: 2023-04-04 | End: 2023-04-04

## 2023-04-04 RX ORDER — HEPARIN SODIUM 1000 [USP'U]/ML
INJECTION, SOLUTION INTRAVENOUS; SUBCUTANEOUS
Status: DISCONTINUED | OUTPATIENT
Start: 2023-04-04 | End: 2023-04-04

## 2023-04-04 RX ORDER — PROCHLORPERAZINE EDISYLATE 5 MG/ML
5 INJECTION INTRAMUSCULAR; INTRAVENOUS EVERY 6 HOURS PRN
Status: DISCONTINUED | OUTPATIENT
Start: 2023-04-04 | End: 2023-04-05 | Stop reason: HOSPADM

## 2023-04-04 RX ORDER — ASPIRIN 81 MG/1
81 TABLET ORAL DAILY
Status: DISCONTINUED | OUTPATIENT
Start: 2023-04-05 | End: 2023-04-05 | Stop reason: HOSPADM

## 2023-04-04 RX ORDER — PHENYLEPHRINE HYDROCHLORIDE 10 MG/ML
INJECTION INTRAVENOUS
Status: DISCONTINUED | OUTPATIENT
Start: 2023-04-04 | End: 2023-04-04

## 2023-04-04 RX ORDER — GLUCAGON 1 MG
1 KIT INJECTION
Status: DISCONTINUED | OUTPATIENT
Start: 2023-04-04 | End: 2023-04-05 | Stop reason: HOSPADM

## 2023-04-04 RX ORDER — LANOLIN ALCOHOL/MO/W.PET/CERES
800 CREAM (GRAM) TOPICAL
Status: DISCONTINUED | OUTPATIENT
Start: 2023-04-04 | End: 2023-04-05 | Stop reason: HOSPADM

## 2023-04-04 RX ORDER — HEPARIN SODIUM,PORCINE/D5W 25000/250
0-40 INTRAVENOUS SOLUTION INTRAVENOUS CONTINUOUS
Status: DISCONTINUED | OUTPATIENT
Start: 2023-04-04 | End: 2023-04-04

## 2023-04-04 RX ORDER — IBUPROFEN 200 MG
16 TABLET ORAL
Status: DISCONTINUED | OUTPATIENT
Start: 2023-04-04 | End: 2023-04-05 | Stop reason: HOSPADM

## 2023-04-04 RX ORDER — METOPROLOL TARTRATE 25 MG/1
25 TABLET, FILM COATED ORAL 2 TIMES DAILY
Status: DISCONTINUED | OUTPATIENT
Start: 2023-04-04 | End: 2023-04-05 | Stop reason: HOSPADM

## 2023-04-04 RX ORDER — POLYETHYLENE GLYCOL 3350 17 G/17G
17 POWDER, FOR SOLUTION ORAL 2 TIMES DAILY PRN
Status: DISCONTINUED | OUTPATIENT
Start: 2023-04-04 | End: 2023-04-05 | Stop reason: HOSPADM

## 2023-04-04 RX ORDER — HYDRALAZINE HYDROCHLORIDE 20 MG/ML
10 INJECTION INTRAMUSCULAR; INTRAVENOUS EVERY 4 HOURS PRN
Status: DISCONTINUED | OUTPATIENT
Start: 2023-04-04 | End: 2023-04-05 | Stop reason: HOSPADM

## 2023-04-04 RX ORDER — ADENOSINE 3 MG/ML
INJECTION, SOLUTION INTRAVENOUS
Status: DISCONTINUED | OUTPATIENT
Start: 2023-04-04 | End: 2023-04-04

## 2023-04-04 RX ORDER — ONDANSETRON 8 MG/1
8 TABLET, ORALLY DISINTEGRATING ORAL EVERY 8 HOURS PRN
Status: DISCONTINUED | OUTPATIENT
Start: 2023-04-04 | End: 2023-04-05 | Stop reason: HOSPADM

## 2023-04-04 RX ORDER — ONDANSETRON 2 MG/ML
4 INJECTION INTRAMUSCULAR; INTRAVENOUS EVERY 6 HOURS PRN
Status: DISCONTINUED | OUTPATIENT
Start: 2023-04-04 | End: 2023-04-05 | Stop reason: HOSPADM

## 2023-04-04 RX ORDER — NALOXONE HCL 0.4 MG/ML
0.02 VIAL (ML) INJECTION
Status: DISCONTINUED | OUTPATIENT
Start: 2023-04-04 | End: 2023-04-05 | Stop reason: HOSPADM

## 2023-04-04 RX ORDER — SODIUM CHLORIDE 0.9 % (FLUSH) 0.9 %
10 SYRINGE (ML) INJECTION EVERY 8 HOURS PRN
Status: DISCONTINUED | OUTPATIENT
Start: 2023-04-04 | End: 2023-04-05 | Stop reason: HOSPADM

## 2023-04-04 RX ORDER — ATORVASTATIN CALCIUM 40 MG/1
40 TABLET, FILM COATED ORAL DAILY
Status: DISCONTINUED | OUTPATIENT
Start: 2023-04-04 | End: 2023-04-05

## 2023-04-04 RX ORDER — CEFAZOLIN SODIUM 1 G/3ML
INJECTION, POWDER, FOR SOLUTION INTRAMUSCULAR; INTRAVENOUS
Status: DISCONTINUED | OUTPATIENT
Start: 2023-04-04 | End: 2023-04-04

## 2023-04-04 RX ORDER — VERAPAMIL HYDROCHLORIDE 2.5 MG/ML
INJECTION, SOLUTION INTRAVENOUS
Status: DISCONTINUED | OUTPATIENT
Start: 2023-04-04 | End: 2023-04-04

## 2023-04-04 RX ORDER — TALC
6 POWDER (GRAM) TOPICAL NIGHTLY PRN
Status: DISCONTINUED | OUTPATIENT
Start: 2023-04-04 | End: 2023-04-05 | Stop reason: HOSPADM

## 2023-04-04 RX ORDER — HEPARIN SODIUM,PORCINE/D5W 25000/250
0-40 INTRAVENOUS SOLUTION INTRAVENOUS CONTINUOUS
Status: DISCONTINUED | OUTPATIENT
Start: 2023-04-04 | End: 2023-04-04 | Stop reason: SDUPTHER

## 2023-04-04 RX ORDER — NAPROXEN SODIUM 220 MG/1
81 TABLET, FILM COATED ORAL DAILY
Status: DISCONTINUED | OUTPATIENT
Start: 2023-04-04 | End: 2023-04-04 | Stop reason: SDUPTHER

## 2023-04-04 RX ORDER — IBUPROFEN 200 MG
24 TABLET ORAL
Status: DISCONTINUED | OUTPATIENT
Start: 2023-04-04 | End: 2023-04-05 | Stop reason: HOSPADM

## 2023-04-04 RX ORDER — ACETAMINOPHEN 325 MG/1
650 TABLET ORAL EVERY 4 HOURS PRN
Status: DISCONTINUED | OUTPATIENT
Start: 2023-04-04 | End: 2023-04-05 | Stop reason: HOSPADM

## 2023-04-04 RX ORDER — MAG HYDROX/ALUMINUM HYD/SIMETH 200-200-20
30 SUSPENSION, ORAL (FINAL DOSE FORM) ORAL 4 TIMES DAILY PRN
Status: DISCONTINUED | OUTPATIENT
Start: 2023-04-04 | End: 2023-04-05 | Stop reason: HOSPADM

## 2023-04-04 RX ORDER — NITROGLYCERIN 5 MG/ML
INJECTION, SOLUTION INTRAVENOUS
Status: DISCONTINUED | OUTPATIENT
Start: 2023-04-04 | End: 2023-04-04

## 2023-04-04 RX ORDER — MIDAZOLAM HYDROCHLORIDE 1 MG/ML
INJECTION, SOLUTION INTRAMUSCULAR; INTRAVENOUS
Status: DISCONTINUED | OUTPATIENT
Start: 2023-04-04 | End: 2023-04-04

## 2023-04-04 RX ORDER — AMOXICILLIN 250 MG
1 CAPSULE ORAL 2 TIMES DAILY
Status: DISCONTINUED | OUTPATIENT
Start: 2023-04-04 | End: 2023-04-05 | Stop reason: HOSPADM

## 2023-04-04 RX ORDER — SODIUM CHLORIDE 9 MG/ML
INJECTION, SOLUTION INTRAVENOUS CONTINUOUS
Status: ACTIVE | OUTPATIENT
Start: 2023-04-04 | End: 2023-04-05

## 2023-04-04 RX ORDER — SIMETHICONE 80 MG
1 TABLET,CHEWABLE ORAL 4 TIMES DAILY PRN
Status: DISCONTINUED | OUTPATIENT
Start: 2023-04-04 | End: 2023-04-05 | Stop reason: HOSPADM

## 2023-04-04 RX ADMIN — SODIUM CHLORIDE: 9 INJECTION, SOLUTION INTRAVENOUS at 05:04

## 2023-04-04 RX ADMIN — SENNOSIDES AND DOCUSATE SODIUM 1 TABLET: 50; 8.6 TABLET ORAL at 08:04

## 2023-04-04 RX ADMIN — ASPIRIN 81 MG CHEWABLE TABLET 81 MG: 81 TABLET CHEWABLE at 09:04

## 2023-04-04 RX ADMIN — METOPROLOL TARTRATE 25 MG: 25 TABLET, FILM COATED ORAL at 08:04

## 2023-04-04 RX ADMIN — SODIUM CHLORIDE: 9 INJECTION, SOLUTION INTRAVENOUS at 09:04

## 2023-04-04 RX ADMIN — HEPARIN SODIUM AND DEXTROSE 12 UNITS/KG/HR: 10000; 5 INJECTION INTRAVENOUS at 06:04

## 2023-04-04 RX ADMIN — METOPROLOL TARTRATE 25 MG: 25 TABLET, FILM COATED ORAL at 05:04

## 2023-04-04 RX ADMIN — ATORVASTATIN CALCIUM 40 MG: 40 TABLET, FILM COATED ORAL at 05:04

## 2023-04-04 RX ADMIN — TICAGRELOR 90 MG: 90 TABLET ORAL at 08:04

## 2023-04-04 NOTE — HPI
"The patient is a 56 yo male with hx CAD/STEMI s/p stent placement 2012 who presented to ED with chest pain -onset approx 2am. Pt states chest pain is described as a sharp pain to the left side of his chest that awoke him from sleep that lasted approx 2-3 seconds associated with chills. Pt denies dizziness, SOB, N/V or diaphoresis. After the episode, the pt was concerned. He checked his BP and noted the SBP was 180, so he came to ED for evaluation. The patient reports he work out with weights and cardio everyday. He noted he had a "cramp" to his left chest that started one week ago that he attributed to a work out injury.   Of note, the pt has not followed up with Cardiology. His home medications is only ASA.     In the ED, /89, EKG showed NSR-no ischemia. troponin is 0.141, CXR pending.     The patient will be placed in observation under hospital medicine   "

## 2023-04-04 NOTE — Clinical Note
Hemodynamics were performed.  and Pullback was recorded. 2019 novel coronavirus disease (COVID-19)  5/2020  Back pain  Chronic- mid and upper back  Benign hypertension    BPH associated with nocturia    Cholelithiasis  Discovered at [facility] 3/2021  Depression    Diverticulosis  Discovered at [facility] 3/2021  DKA (diabetic ketoacidosis)  [facility] 3/2021  GERD (gastroesophageal reflux disease)    Hepatitis C  in remission  HLD (hyperlipidemia)    Insomnia    Lung abscess  RLL found at [facility] 3/2021  OA (osteoarthritis)  BL shoulders  Thyroid nodule  Found at [facility] 3/2021  Type 2 diabetes mellitus without complication, without long-term current use of insulin  Newly diagnosed at [facility] 3/2021   2019 novel coronavirus disease (COVID-19)  5/2020  Back pain  Chronic- mid and upper back  Benign hypertension    BPH associated with nocturia    Cholelithiasis  Discovered at [facility] 3/2021  Depression    Diverticulosis  Discovered at [facility] 3/2021  DKA (diabetic ketoacidosis)  [facility] 3/2021  GERD (gastroesophageal reflux disease)    Hepatitis C  in remission  HLD (hyperlipidemia)    Insomnia    Lung abscess  RLL found at [facility] 3/2021  OA (osteoarthritis)  BL shoulders  Shingles  5/2021  Thyroid nodule  Found at [facility] 3/2021  Type 2 diabetes mellitus without complication, without long-term current use of insulin  Newly diagnosed at [facility] 3/2021

## 2023-04-04 NOTE — HPI
"The patient is a 56 yo male with hx CAD/STEMI s/p stent placement 2012 who presented to ED with chest pain -onset approx 2am. Pt states chest pain is described as a sharp pain to the left side of his chest that awoke him from sleep that lasted approx 2-3 seconds associated with chills. Pt denies dizziness, SOB, N/V or diaphoresis. After the episode, the pt was concerned. He checked his BP and noted the SBP was 180, so he came to ED for evaluation. The patient reports he work out with weights and cardio everyday. He noted he had a "cramp" to his left chest that started one week ago that he attributed to a work out injury.   Of note, the pt has not followed up with Cardiology. His home medications is only ASA.      In the ED, /89, EKG showed NSR-no ischemia. troponin is 0.141, CXR pending.     56 yo male cardiology consult for NSTEMI/ACS  Pmh CAD s/p PCI DESx2 in p and mid LAD in 2012 by Dr. Lemus  Resting chest pain for 3 days. Good exercise,   Came in ER last night with troponin 0.148--> 0.11  Ekg NSR no acute stt change  Now chest pain free  On heparin tt     "

## 2023-04-04 NOTE — H&P (VIEW-ONLY)
"[facility] - Emergency Dept.  Cardiology  Consult Note    Patient Name: Solomon Joseph  MRN: [id number]  Admission Date: 4/4/2023  Hospital Length of Stay: 0 days  Code Status: Full Code   Attending Provider: Corrina Delacruz MD   Consulting Provider: Anam Dumas MD  Primary Care Physician: Primary Doctor No  Principal Problem:Chest pain    Patient information was obtained from patient and ER records.     Inpatient consult to Cardiology  Consult performed by: Anam Dumas MD  Consult ordered by: Johana Singh NP        Subjective:       HPI:   The patient is a 56 yo male with hx CAD/STEMI s/p stent placement 2012 who presented to ED with chest pain -onset approx 2am. Pt states chest pain is described as a sharp pain to the left side of his chest that awoke him from sleep that lasted approx 2-3 seconds associated with chills. Pt denies dizziness, SOB, N/V or diaphoresis. After the episode, the pt was concerned. He checked his BP and noted the SBP was 180, so he came to ED for evaluation. The patient reports he work out with weights and cardio everyday. He noted he had a "cramp" to his left chest that started one week ago that he attributed to a work out injury.   Of note, the pt has not followed up with Cardiology. His home medications is only ASA.      In the ED, /89, EKG showed NSR-no ischemia. troponin is 0.141, CXR pending.     56 yo male cardiology consult for NSTEMI/ACS  Pmh CAD s/p PCI DESx2 in p and mid LAD in 2012 by Dr. Lemus  Resting chest pain for 3 days. Good exercise,   Came in ER last night with troponin 0.148--> 0.11  Ekg NSR no acute stt change  Now chest pain free  On heparin tt         Past Medical History:   Diagnosis Date    Coronary artery disease     Hypertension        Past Surgical History:   Procedure Laterality Date    CORONARY STENT PLACEMENT         Review of patient's allergies indicates:  No Known Allergies    No current facility-administered medications on file prior to encounter. "     Current Outpatient Medications on File Prior to Encounter   Medication Sig    aspirin 81 MG Chew Take 81 mg by mouth once daily.    lisinopril (PRINIVIL,ZESTRIL) 2.5 MG tablet TAKE 1 TABLET BY MOUTH EVERY DAY    metoprolol tartrate (LOPRESSOR) 25 MG tablet TAKE ONE TABLET BY MOUTH TWICE DAILY    prasugrel (EFFIENT) 10 mg Tab TAKE 1 TABLET BY MOUTH EVERY DAY    simvastatin (ZOCOR) 40 MG tablet TAKE ONE TABLET BY MOUTH ONCE DAILY    simvastatin (ZOCOR) 40 MG tablet TAKE 1 TABLET BY MOUTH EVERY DAY    [DISCONTINUED] metoprolol tartrate (LOPRESSOR) 25 MG tablet TAKE 1 TABLET BY MOUTH TWICE DAILY    [DISCONTINUED] metoprolol tartrate (LOPRESSOR) 25 MG tablet TAKE 1 TABLET(25 MG) BY MOUTH TWICE DAILY    [DISCONTINUED] simvastatin (ZOCOR) 40 MG tablet Take 1 tablet (40 mg total) by mouth once daily. Patient must call office for appointment     Family History       Problem Relation (Age of Onset)    Heart disease Father          Tobacco Use    Smoking status: Former    Smokeless tobacco: Never   Substance and Sexual Activity    Alcohol use: No    Drug use: No    Sexual activity: Not on file     Review of Systems   Constitutional: Negative for decreased appetite, diaphoresis, fever, malaise/fatigue and night sweats.   HENT:  Negative for nosebleeds.    Eyes:  Negative for blurred vision and double vision.   Cardiovascular:  Positive for chest pain. Negative for claudication, dyspnea on exertion, irregular heartbeat, leg swelling, near-syncope, orthopnea, palpitations, paroxysmal nocturnal dyspnea and syncope.   Respiratory:  Negative for cough, shortness of breath, sleep disturbances due to breathing, snoring, sputum production and wheezing.    Endocrine: Negative for cold intolerance and polyuria.   Hematologic/Lymphatic: Does not bruise/bleed easily.   Skin:  Negative for rash.   Musculoskeletal:  Negative for back pain, falls, joint pain, joint swelling and neck pain.   Gastrointestinal:  Negative for  abdominal pain, heartburn, nausea and vomiting.   Genitourinary:  Negative for dysuria, frequency and hematuria.   Neurological:  Negative for difficulty with concentration, dizziness, focal weakness, headaches, light-headedness, numbness, seizures and weakness.   Psychiatric/Behavioral:  Negative for depression, memory loss and substance abuse. The patient does not have insomnia.    Allergic/Immunologic: Negative for HIV exposure and hives.   Objective:     Vital Signs (Most Recent):  Temp: 98 °F (36.7 °C) (04/04/23 0604)  Pulse: 65 (04/04/23 0604)  Resp: 20 (04/04/23 0604)  BP: 133/80 (04/04/23 0604)  SpO2: 97 % (04/04/23 0604) Vital Signs (24h Range):  Temp:  [97.6 °F (36.4 °C)-98 °F (36.7 °C)] 98 °F (36.7 °C)  Pulse:  [65-88] 65  Resp:  [16-20] 20  SpO2:  [96 %-99 %] 97 %  BP: (132-167)/(80-95) 133/80     Weight: 79.8 kg (176 lb)  Body mass index is 27.57 kg/m².    SpO2: 97 %       No intake or output data in the 24 hours ending 04/04/23 0935    Lines/Drains/Airways       Peripheral Intravenous Line  Duration                  Peripheral IV - Single Lumen 04/04/23 0328 18 G Left Antecubital <1 day         Peripheral IV - Single Lumen 04/04/23 0550 18 G Posterior;Right Forearm <1 day                    Physical Exam  HENT:      Head: Normocephalic.   Eyes:      Pupils: Pupils are equal, round, and reactive to light.   Neck:      Thyroid: No thyromegaly.      Vascular: Normal carotid pulses. No carotid bruit or JVD.   Cardiovascular:      Rate and Rhythm: Normal rate and regular rhythm. No extrasystoles are present.     Chest Wall: PMI is not displaced.      Pulses: Normal pulses.      Heart sounds: Normal heart sounds. No murmur heard.    No gallop. No S3 sounds.   Pulmonary:      Effort: No respiratory distress.      Breath sounds: Normal breath sounds. No stridor.   Abdominal:      General: Bowel sounds are normal.      Palpations: Abdomen is soft.      Tenderness: There is no abdominal tenderness. There is no  rebound.   Musculoskeletal:         General: Normal range of motion.   Skin:     Findings: No rash.   Neurological:      Mental Status: He is alert and oriented to person, place, and time.   Psychiatric:         Behavior: Behavior normal.       Significant Labs: ABG: No results for input(s): PH, PCO2, HCO3, POCSATURATED, BE in the last 48 hours., Blood Culture: No results for input(s): LABBLOO in the last 48 hours., BMP:   Recent Labs   Lab 04/04/23  0329 04/04/23  0549   GLU 97 95    138   K 4.0 3.8    106   CO2 23 22*   BUN 17 15   CREATININE 0.8 0.8   CALCIUM 8.6* 8.7   MG  --  1.9   , CMP   Recent Labs   Lab 04/04/23  0329 04/04/23  0549    138   K 4.0 3.8    106   CO2 23 22*   GLU 97 95   BUN 17 15   CREATININE 0.8 0.8   CALCIUM 8.6* 8.7   PROT 6.8 6.9   ALBUMIN 4.1 4.0   BILITOT 0.3 0.3   ALKPHOS 69 69   AST 23 21   ALT 25 25   ANIONGAP 11 10   , CBC   Recent Labs   Lab 04/04/23  0329 04/04/23  0549   WBC 5.19 5.61   HGB 15.0 15.2   HCT 43.2 43.7    199   , INR   Recent Labs   Lab 04/04/23  0549   INR 1.0   , Lipid Panel No results for input(s): CHOL, HDL, LDLCALC, TRIG, CHOLHDL in the last 48 hours., and Troponin   Recent Labs   Lab 04/04/23  0329 04/04/23  0549   TROPONINI 0.141* 0.108*       Significant Imaging: EKG:      Assessment and Plan:     NSTEMI (non-ST elevated myocardial infarction)  NSTEMI/ACS  CAD s/p p and mid LAD DESx 2 in 2012    --continue ASA Heparin gtt and statin and metorpolol  --keep NPO and IVF  --C today  --I have explained the risks, benefits, and alternatives of the procedure in detail with patient and family. The patient voices understanding and all questions have been addressed.  The patient agrees to proceed as planned.          VTE Risk Mitigation (From admission, onward)         Ordered     heparin 25,000 units in dextrose 5% (100 units/ml) IV bolus from bag - ADDITIONAL PRN BOLUS - 60 units/kg (max bolus 4000 units)  As needed (PRN)         Question:  Heparin Infusion Adjustment (DO NOT MODIFY ANSWER)  Answer:  \\[email or web address]\epic\Images\Pharmacy\HeparinInfusions\heparin LOW INTENSITY nomogram for [facility] [id number].pdf    04/04/23 0508     heparin 25,000 units in dextrose 5% (100 units/ml) IV bolus from bag - ADDITIONAL PRN BOLUS - 30 units/kg (max bolus 4000 units)  As needed (PRN)        Question:  Heparin Infusion Adjustment (DO NOT MODIFY ANSWER)  Answer:  \\[email or web address]\epic\Images\Pharmacy\HeparinInfusions\heparin LOW INTENSITY nomogram for [facility] [id number].pdf    04/04/23 0508     heparin 25,000 units in dextrose 5% 250 mL (100 units/mL) infusion LOW INTENSITY nomogram - [facility]  Continuous        Question Answer Comment   Heparin Infusion Adjustment (DO NOT MODIFY ANSWER) \\[email or web address]\epic\Images\Pharmacy\HeparinInfusions\heparin LOW INTENSITY nomogram for [facility] [id number].pdf    Begin at (in units/kg/hr) 12        04/04/23 0508     IP VTE LOW RISK PATIENT  Once         04/04/23 0508     Place sequential compression device  Until discontinued         04/04/23 0508                Thank you for your consult. I will follow-up with patient. Please contact us if you have any additional questions.    Anam Dumas MD  Cardiology   [facility] - Emergency Dept.

## 2023-04-04 NOTE — Clinical Note
285 ml of contrast were injected throughout the case. 15 mL of contrast was the total wasted during the case. 300 mL was the total amount used during the case.

## 2023-04-04 NOTE — PHARMACY MED REC
"Admission Medication History     The home medication history was taken by Lemuel Little.    You may go to "Admission" then "Reconcile Home Medications" tabs to review and/or act upon these items.     The home medication list has been updated by the Pharmacy department.   Please read ALL comments highlighted in yellow.   Please address this information as you see fit.    Feel free to contact us if you have any questions or require assistance.      Medications listed below were obtained from: Analytic software[employer] and Medical records  (Not in a hospital admission)      Lemuel Little  [id number]    Current Outpatient Medications on File Prior to Encounter   Medication Sig Dispense Refill Last Dose    aspirin 81 MG Chew Take 81 mg by mouth once daily.       lisinopril (PRINIVIL,ZESTRIL) 2.5 MG tablet TAKE 1 TABLET BY MOUTH EVERY DAY 30 tablet 0     metoprolol tartrate (LOPRESSOR) 25 MG tablet TAKE ONE TABLET BY MOUTH TWICE DAILY 60 tablet 0     prasugrel (EFFIENT) 10 mg Tab TAKE 1 TABLET BY MOUTH EVERY DAY 30 tablet 6     simvastatin (ZOCOR) 40 MG tablet TAKE 1 TABLET BY MOUTH EVERY DAY 30 tablet 6    .        "

## 2023-04-04 NOTE — H&P
"[facility] - Emergency Dept.  [facility] Medicine  History & Physical    Patient Name: Solomon Joseph  MRN: [id number]  Patient Class: OP- Observation  Admission Date: 4/4/2023  Attending Physician: Dr. Beltrán  Primary Care Provider: Primary Doctor No         Patient information was obtained from patient and ER records.     Subjective:     Principal Problem:Chest pain    Chief Complaint:   Chief Complaint   Patient presents with    Chest Pain     Pt reports waking with SS CP (sharp L side) non-radiating. Reported numbness in arms. Pt took 3x 81 mg asa PTA. Pt reports pain has resolved. Reports stent placement 12 yrs ago.        HPI: The patient is a 58 yo male with hx CAD/STEMI s/p stent placement 2012 who presented to ED with chest pain -onset approx 2am. Pt states chest pain is described as a sharp pain to the left side of his chest that awoke him from sleep that lasted approx 2-3 seconds associated with chills. Pt denies dizziness, SOB, N/V or diaphoresis. After the episode, the pt was concerned. He checked his BP and noted the SBP was 180, so he came to ED for evaluation. The patient reports he work out with weights and cardio everyday. He noted he had a "cramp" to his left chest that started one week ago that he attributed to a work out injury.   Of note, the pt has not followed up with Cardiology. His home medications is only ASA.     In the ED, /89, EKG showed NSR-no ischemia. troponin is 0.141, CXR pending.     The patient will be placed in observation under hospital medicine       Past Medical History:   Diagnosis Date    Coronary artery disease     Hypertension        Past Surgical History:   Procedure Laterality Date    CORONARY STENT PLACEMENT         Review of patient's allergies indicates:  No Known Allergies    No current facility-administered medications on file prior to encounter.     Current Outpatient Medications on File Prior to Encounter   Medication Sig    aspirin 81 MG Chew Take 81 mg by " mouth once daily.    lisinopril (PRINIVIL,ZESTRIL) 2.5 MG tablet TAKE 1 TABLET BY MOUTH EVERY DAY    metoprolol tartrate (LOPRESSOR) 25 MG tablet TAKE ONE TABLET BY MOUTH TWICE DAILY    prasugrel (EFFIENT) 10 mg Tab TAKE 1 TABLET BY MOUTH EVERY DAY    simvastatin (ZOCOR) 40 MG tablet TAKE ONE TABLET BY MOUTH ONCE DAILY    simvastatin (ZOCOR) 40 MG tablet TAKE 1 TABLET BY MOUTH EVERY DAY    [DISCONTINUED] metoprolol tartrate (LOPRESSOR) 25 MG tablet TAKE 1 TABLET BY MOUTH TWICE DAILY    [DISCONTINUED] metoprolol tartrate (LOPRESSOR) 25 MG tablet TAKE 1 TABLET(25 MG) BY MOUTH TWICE DAILY    [DISCONTINUED] simvastatin (ZOCOR) 40 MG tablet Take 1 tablet (40 mg total) by mouth once daily. Patient must call office for appointment     Family History       Problem Relation (Age of Onset)    Heart disease Father          Tobacco Use    Smoking status: Former    Smokeless tobacco: Never   Substance and Sexual Activity    Alcohol use: No    Drug use: No    Sexual activity: Not on file     Review of Systems   Constitutional:  Positive for chills. Negative for appetite change, diaphoresis, fatigue and fever.   HENT:  Negative for congestion, nosebleeds, sore throat and trouble swallowing.    Eyes:  Negative for pain, discharge and visual disturbance.   Respiratory:  Negative for apnea, cough, chest tightness, shortness of breath, wheezing and stridor.    Cardiovascular:  Positive for chest pain. Negative for palpitations and leg swelling.   Gastrointestinal:  Negative for abdominal distention, abdominal pain, blood in stool, constipation, diarrhea, nausea and vomiting.   Endocrine: Negative for cold intolerance and heat intolerance.   Genitourinary:  Negative for difficulty urinating, dysuria, flank pain, frequency and urgency.   Musculoskeletal:  Negative for arthralgias, back pain, joint swelling, myalgias, neck pain and neck stiffness.   Skin:  Negative for rash and wound.   Allergic/Immunologic: Negative for  food allergies and immunocompromised state.   Neurological:  Negative for dizziness, seizures, syncope, facial asymmetry, weakness, light-headedness and headaches.   Hematological:  Negative for adenopathy.   Psychiatric/Behavioral:  Negative for agitation, behavioral problems and confusion. The patient is not nervous/anxious.    Objective:     Vital Signs (Most Recent):  Temp: 97.6 °F (36.4 °C) (04/04/23 0255)  Pulse: 72 (04/04/23 0554)  Resp: 17 (04/04/23 0409)  BP: (!) 152/85 (04/04/23 0554)  SpO2: 97 % (04/04/23 0409)   Vital Signs (24h Range):  Temp:  [97.6 °F (36.4 °C)] 97.6 °F (36.4 °C)  Pulse:  [66-88] 72  Resp:  [16-20] 17  SpO2:  [96 %-99 %] 97 %  BP: (132-167)/(81-95) 152/85     Weight: 80 kg (176 lb 5.9 oz)  Body mass index is 27.62 kg/m².    Physical Exam  Vitals and nursing note reviewed.   Constitutional:       General: He is not in acute distress.     Appearance: He is well-developed. He is not diaphoretic.   HENT:      Head: Normocephalic and atraumatic.      Nose: Nose normal.   Eyes:      General: No scleral icterus.     Conjunctiva/sclera: Conjunctivae normal.   Cardiovascular:      Rate and Rhythm: Normal rate and regular rhythm.      Heart sounds: Normal heart sounds. No murmur heard.    No friction rub. No gallop.   Pulmonary:      Effort: Pulmonary effort is normal. No respiratory distress.      Breath sounds: Normal breath sounds. No stridor. No wheezing or rales.   Chest:      Chest wall: No tenderness.   Abdominal:      General: Bowel sounds are normal. There is no distension.      Palpations: Abdomen is soft.      Tenderness: There is no abdominal tenderness. There is no guarding or rebound.   Musculoskeletal:         General: No tenderness or deformity. Normal range of motion.      Cervical back: Normal range of motion and neck supple.   Skin:     General: Skin is warm and dry.      Coloration: Skin is not pale.      Findings: No erythema or rash.   Neurological:      Mental Status: He  is alert and oriented to person, place, and time.      Cranial Nerves: No cranial nerve deficit.      Motor: No abnormal muscle tone.      Coordination: Coordination normal.      Deep Tendon Reflexes: Reflexes are normal and symmetric.   Psychiatric:         Behavior: Behavior normal.         Thought Content: Thought content normal.           Significant Labs: All pertinent labs within the past 24 hours have been reviewed.    Significant Imaging: I have reviewed all pertinent imaging results/findings within the past 24 hours.    Assessment/Plan:     * Chest pain  Serial troponin   Echo   ASA, BB, Statin, IV heparin   Consult cardiology       CAD (coronary artery disease)  S/p stent placement 2012  See plan above       Elevated blood pressure reading  Start Lopressor   IV hydralazine prn       Elevated troponin  See above         VTE Risk Mitigation (From admission, onward)         Ordered     heparin 25,000 units in dextrose 5% (100 units/ml) IV bolus from bag - ADDITIONAL PRN BOLUS - 60 units/kg (max bolus 4000 units)  As needed (PRN)        Question:  Heparin Infusion Adjustment (DO NOT MODIFY ANSWER)  Answer:  \[email or web address]\epic\Images\Pharmacy\HeparinInfusions\heparin LOW INTENSITY nomogram for [facility] [id number].pdf    04/04/23 0508     heparin 25,000 units in dextrose 5% (100 units/ml) IV bolus from bag - ADDITIONAL PRN BOLUS - 30 units/kg (max bolus 4000 units)  As needed (PRN)        Question:  Heparin Infusion Adjustment (DO NOT MODIFY ANSWER)  Answer:  \[email or web address]\epic\Images\Pharmacy\HeparinInfusions\heparin LOW INTENSITY nomogram for [facility] [id number].pdf    04/04/23 0508     heparin 25,000 units in dextrose 5% 250 mL (100 units/mL) infusion LOW INTENSITY nomogram - [facility]  Continuous        Question Answer Comment   Heparin Infusion Adjustment (DO NOT MODIFY ANSWER) \\[email or web address]\epic\Images\Pharmacy\HeparinInfusions\heparin LOW INTENSITY nomogram for [facility] [id number].pdf    Begin at (in units/kg/hr) 12        04/04/23 0505      IP VTE LOW RISK PATIENT  Once         04/04/23 0508     Place sequential compression device  Until discontinued         04/04/23 0508                     On 04/04/2023, patient should be placed in hospital observation services under my care in collaboration with Dr. Beltrán.      Johana Sinhg NP  Department of Hospital Medicine  [facility] - Emergency Dept.

## 2023-04-04 NOTE — OP NOTE
INPATIENT Operative Note         SUMMARY     Surgery Date: 4/4/2023     Surgeon(s) and Role:     * Glen Lemus MD - Primary    ASSISTANT:none    Pre-op Diagnosis:  NSTEMI (non-ST elevated myocardial infarction) [I21.4]      Post-op Diagnosis:  NSTEMI (non-ST elevated myocardial infarction) [I21.4]    Procedure(s) (LRB):  Left heart cath (Left)  Percutaneous coronary intervention (N/A)  INSERTION, STENT, CORONARY ARTERY (N/A)  Instantaneous Wave-Free Ratio (IFR) (N/A)  IVUS, Coronary    COMPLICATION:none    Anesthesia: RN IV Sedation    Findings/Key Components:  Lad stent 50%angiographically diffuse mid lad disease.  Ifr 0.91 ffr 0.73  Lcx large dominant left pda subtotal treated with stent placement.  D1 99% small d2 50%   Lvf normal.      Estimated Blood Loss: < 50 ML.         SPECIMEN: NONE    Devices/Prostetics: stent x3 synergy xd    PLAN:  Routine post stent care   Evalaute with stress cardiolite consider minimal invasive lima.

## 2023-04-04 NOTE — PLAN OF CARE
Pt awakened after procedure and remains AAOx3 at time of transfer.   R wrist, R groin, and L wrist dressings remain CDI at time of transfer.   Transferred pt to room 222 via stretcher in no apparent distress.   Report given to Nancy NICK. No further questions at this time.

## 2023-04-04 NOTE — SUBJECTIVE & OBJECTIVE
Past Medical History:   Diagnosis Date    Coronary artery disease     Hypertension        Past Surgical History:   Procedure Laterality Date    CORONARY STENT PLACEMENT         Review of patient's allergies indicates:  No Known Allergies    No current facility-administered medications on file prior to encounter.     Current Outpatient Medications on File Prior to Encounter   Medication Sig    aspirin 81 MG Chew Take 81 mg by mouth once daily.    lisinopril (PRINIVIL,ZESTRIL) 2.5 MG tablet TAKE 1 TABLET BY MOUTH EVERY DAY    metoprolol tartrate (LOPRESSOR) 25 MG tablet TAKE ONE TABLET BY MOUTH TWICE DAILY    prasugrel (EFFIENT) 10 mg Tab TAKE 1 TABLET BY MOUTH EVERY DAY    simvastatin (ZOCOR) 40 MG tablet TAKE ONE TABLET BY MOUTH ONCE DAILY    simvastatin (ZOCOR) 40 MG tablet TAKE 1 TABLET BY MOUTH EVERY DAY    [DISCONTINUED] metoprolol tartrate (LOPRESSOR) 25 MG tablet TAKE 1 TABLET BY MOUTH TWICE DAILY    [DISCONTINUED] metoprolol tartrate (LOPRESSOR) 25 MG tablet TAKE 1 TABLET(25 MG) BY MOUTH TWICE DAILY    [DISCONTINUED] simvastatin (ZOCOR) 40 MG tablet Take 1 tablet (40 mg total) by mouth once daily. Patient must call office for appointment     Family History       Problem Relation (Age of Onset)    Heart disease Father          Tobacco Use    Smoking status: Former    Smokeless tobacco: Never   Substance and Sexual Activity    Alcohol use: No    Drug use: No    Sexual activity: Not on file     Review of Systems   Constitutional: Negative for decreased appetite, diaphoresis, fever, malaise/fatigue and night sweats.   HENT:  Negative for nosebleeds.    Eyes:  Negative for blurred vision and double vision.   Cardiovascular:  Positive for chest pain. Negative for claudication, dyspnea on exertion, irregular heartbeat, leg swelling, near-syncope, orthopnea, palpitations, paroxysmal nocturnal dyspnea and syncope.   Respiratory:  Negative for cough, shortness of breath, sleep disturbances due to breathing, snoring,  sputum production and wheezing.    Endocrine: Negative for cold intolerance and polyuria.   Hematologic/Lymphatic: Does not bruise/bleed easily.   Skin:  Negative for rash.   Musculoskeletal:  Negative for back pain, falls, joint pain, joint swelling and neck pain.   Gastrointestinal:  Negative for abdominal pain, heartburn, nausea and vomiting.   Genitourinary:  Negative for dysuria, frequency and hematuria.   Neurological:  Negative for difficulty with concentration, dizziness, focal weakness, headaches, light-headedness, numbness, seizures and weakness.   Psychiatric/Behavioral:  Negative for depression, memory loss and substance abuse. The patient does not have insomnia.    Allergic/Immunologic: Negative for HIV exposure and hives.   Objective:     Vital Signs (Most Recent):  Temp: 98 °F (36.7 °C) (04/04/23 0604)  Pulse: 65 (04/04/23 0604)  Resp: 20 (04/04/23 0604)  BP: 133/80 (04/04/23 0604)  SpO2: 97 % (04/04/23 0604) Vital Signs (24h Range):  Temp:  [97.6 °F (36.4 °C)-98 °F (36.7 °C)] 98 °F (36.7 °C)  Pulse:  [65-88] 65  Resp:  [16-20] 20  SpO2:  [96 %-99 %] 97 %  BP: (132-167)/(80-95) 133/80     Weight: 79.8 kg (176 lb)  Body mass index is 27.57 kg/m².    SpO2: 97 %       No intake or output data in the 24 hours ending 04/04/23 0935    Lines/Drains/Airways       Peripheral Intravenous Line  Duration                  Peripheral IV - Single Lumen 04/04/23 0328 18 G Left Antecubital <1 day         Peripheral IV - Single Lumen 04/04/23 0550 18 G Posterior;Right Forearm <1 day                    Physical Exam  HENT:      Head: Normocephalic.   Eyes:      Pupils: Pupils are equal, round, and reactive to light.   Neck:      Thyroid: No thyromegaly.      Vascular: Normal carotid pulses. No carotid bruit or JVD.   Cardiovascular:      Rate and Rhythm: Normal rate and regular rhythm. No extrasystoles are present.     Chest Wall: PMI is not displaced.      Pulses: Normal pulses.      Heart sounds: Normal heart sounds.  No murmur heard.    No gallop. No S3 sounds.   Pulmonary:      Effort: No respiratory distress.      Breath sounds: Normal breath sounds. No stridor.   Abdominal:      General: Bowel sounds are normal.      Palpations: Abdomen is soft.      Tenderness: There is no abdominal tenderness. There is no rebound.   Musculoskeletal:         General: Normal range of motion.   Skin:     Findings: No rash.   Neurological:      Mental Status: He is alert and oriented to person, place, and time.   Psychiatric:         Behavior: Behavior normal.       Significant Labs: ABG: No results for input(s): PH, PCO2, HCO3, POCSATURATED, BE in the last 48 hours., Blood Culture: No results for input(s): LABBLOO in the last 48 hours., BMP:   Recent Labs   Lab 04/04/23 0329 04/04/23  0549   GLU 97 95    138   K 4.0 3.8    106   CO2 23 22*   BUN 17 15   CREATININE 0.8 0.8   CALCIUM 8.6* 8.7   MG  --  1.9   , CMP   Recent Labs   Lab 04/04/23 0329 04/04/23  0549    138   K 4.0 3.8    106   CO2 23 22*   GLU 97 95   BUN 17 15   CREATININE 0.8 0.8   CALCIUM 8.6* 8.7   PROT 6.8 6.9   ALBUMIN 4.1 4.0   BILITOT 0.3 0.3   ALKPHOS 69 69   AST 23 21   ALT 25 25   ANIONGAP 11 10   , CBC   Recent Labs   Lab 04/04/23 0329 04/04/23  0549   WBC 5.19 5.61   HGB 15.0 15.2   HCT 43.2 43.7    199   , INR   Recent Labs   Lab 04/04/23  0549   INR 1.0   , Lipid Panel No results for input(s): CHOL, HDL, LDLCALC, TRIG, CHOLHDL in the last 48 hours., and Troponin   Recent Labs   Lab 04/04/23  0329 04/04/23  0549   TROPONINI 0.141* 0.108*       Significant Imaging: EKG:

## 2023-04-04 NOTE — CONSULTS
"[facility] - Emergency Dept.  Cardiology  Consult Note    Patient Name: Solomon Joseph  MRN: [id number]  Admission Date: 4/4/2023  Hospital Length of Stay: 0 days  Code Status: Full Code   Attending Provider: Corrina Delacruz MD   Consulting Provider: Anam Dumas MD  Primary Care Physician: Primary Doctor No  Principal Problem:Chest pain    Patient information was obtained from patient and ER records.     Inpatient consult to Cardiology  Consult performed by: Anam Dumas MD  Consult ordered by: Johana Singh NP        Subjective:       HPI:   The patient is a 56 yo male with hx CAD/STEMI s/p stent placement 2012 who presented to ED with chest pain -onset approx 2am. Pt states chest pain is described as a sharp pain to the left side of his chest that awoke him from sleep that lasted approx 2-3 seconds associated with chills. Pt denies dizziness, SOB, N/V or diaphoresis. After the episode, the pt was concerned. He checked his BP and noted the SBP was 180, so he came to ED for evaluation. The patient reports he work out with weights and cardio everyday. He noted he had a "cramp" to his left chest that started one week ago that he attributed to a work out injury.   Of note, the pt has not followed up with Cardiology. His home medications is only ASA.      In the ED, /89, EKG showed NSR-no ischemia. troponin is 0.141, CXR pending.     56 yo male cardiology consult for NSTEMI/ACS  Pmh CAD s/p PCI DESx2 in p and mid LAD in 2012 by Dr. Lemus  Resting chest pain for 3 days. Good exercise,   Came in ER last night with troponin 0.148--> 0.11  Ekg NSR no acute stt change  Now chest pain free  On heparin tt         Past Medical History:   Diagnosis Date    Coronary artery disease     Hypertension        Past Surgical History:   Procedure Laterality Date    CORONARY STENT PLACEMENT         Review of patient's allergies indicates:  No Known Allergies    No current facility-administered medications on file prior to encounter. "     Current Outpatient Medications on File Prior to Encounter   Medication Sig    aspirin 81 MG Chew Take 81 mg by mouth once daily.    lisinopril (PRINIVIL,ZESTRIL) 2.5 MG tablet TAKE 1 TABLET BY MOUTH EVERY DAY    metoprolol tartrate (LOPRESSOR) 25 MG tablet TAKE ONE TABLET BY MOUTH TWICE DAILY    prasugrel (EFFIENT) 10 mg Tab TAKE 1 TABLET BY MOUTH EVERY DAY    simvastatin (ZOCOR) 40 MG tablet TAKE ONE TABLET BY MOUTH ONCE DAILY    simvastatin (ZOCOR) 40 MG tablet TAKE 1 TABLET BY MOUTH EVERY DAY    [DISCONTINUED] metoprolol tartrate (LOPRESSOR) 25 MG tablet TAKE 1 TABLET BY MOUTH TWICE DAILY    [DISCONTINUED] metoprolol tartrate (LOPRESSOR) 25 MG tablet TAKE 1 TABLET(25 MG) BY MOUTH TWICE DAILY    [DISCONTINUED] simvastatin (ZOCOR) 40 MG tablet Take 1 tablet (40 mg total) by mouth once daily. Patient must call office for appointment     Family History       Problem Relation (Age of Onset)    Heart disease Father          Tobacco Use    Smoking status: Former    Smokeless tobacco: Never   Substance and Sexual Activity    Alcohol use: No    Drug use: No    Sexual activity: Not on file     Review of Systems   Constitutional: Negative for decreased appetite, diaphoresis, fever, malaise/fatigue and night sweats.   HENT:  Negative for nosebleeds.    Eyes:  Negative for blurred vision and double vision.   Cardiovascular:  Positive for chest pain. Negative for claudication, dyspnea on exertion, irregular heartbeat, leg swelling, near-syncope, orthopnea, palpitations, paroxysmal nocturnal dyspnea and syncope.   Respiratory:  Negative for cough, shortness of breath, sleep disturbances due to breathing, snoring, sputum production and wheezing.    Endocrine: Negative for cold intolerance and polyuria.   Hematologic/Lymphatic: Does not bruise/bleed easily.   Skin:  Negative for rash.   Musculoskeletal:  Negative for back pain, falls, joint pain, joint swelling and neck pain.   Gastrointestinal:  Negative for  abdominal pain, heartburn, nausea and vomiting.   Genitourinary:  Negative for dysuria, frequency and hematuria.   Neurological:  Negative for difficulty with concentration, dizziness, focal weakness, headaches, light-headedness, numbness, seizures and weakness.   Psychiatric/Behavioral:  Negative for depression, memory loss and substance abuse. The patient does not have insomnia.    Allergic/Immunologic: Negative for HIV exposure and hives.   Objective:     Vital Signs (Most Recent):  Temp: 98 °F (36.7 °C) (04/04/23 0604)  Pulse: 65 (04/04/23 0604)  Resp: 20 (04/04/23 0604)  BP: 133/80 (04/04/23 0604)  SpO2: 97 % (04/04/23 0604) Vital Signs (24h Range):  Temp:  [97.6 °F (36.4 °C)-98 °F (36.7 °C)] 98 °F (36.7 °C)  Pulse:  [65-88] 65  Resp:  [16-20] 20  SpO2:  [96 %-99 %] 97 %  BP: (132-167)/(80-95) 133/80     Weight: 79.8 kg (176 lb)  Body mass index is 27.57 kg/m².    SpO2: 97 %       No intake or output data in the 24 hours ending 04/04/23 0935    Lines/Drains/Airways       Peripheral Intravenous Line  Duration                  Peripheral IV - Single Lumen 04/04/23 0328 18 G Left Antecubital <1 day         Peripheral IV - Single Lumen 04/04/23 0550 18 G Posterior;Right Forearm <1 day                    Physical Exam  HENT:      Head: Normocephalic.   Eyes:      Pupils: Pupils are equal, round, and reactive to light.   Neck:      Thyroid: No thyromegaly.      Vascular: Normal carotid pulses. No carotid bruit or JVD.   Cardiovascular:      Rate and Rhythm: Normal rate and regular rhythm. No extrasystoles are present.     Chest Wall: PMI is not displaced.      Pulses: Normal pulses.      Heart sounds: Normal heart sounds. No murmur heard.    No gallop. No S3 sounds.   Pulmonary:      Effort: No respiratory distress.      Breath sounds: Normal breath sounds. No stridor.   Abdominal:      General: Bowel sounds are normal.      Palpations: Abdomen is soft.      Tenderness: There is no abdominal tenderness. There is no  rebound.   Musculoskeletal:         General: Normal range of motion.   Skin:     Findings: No rash.   Neurological:      Mental Status: He is alert and oriented to person, place, and time.   Psychiatric:         Behavior: Behavior normal.       Significant Labs: ABG: No results for input(s): PH, PCO2, HCO3, POCSATURATED, BE in the last 48 hours., Blood Culture: No results for input(s): LABBLOO in the last 48 hours., BMP:   Recent Labs   Lab 04/04/23  0329 04/04/23  0549   GLU 97 95    138   K 4.0 3.8    106   CO2 23 22*   BUN 17 15   CREATININE 0.8 0.8   CALCIUM 8.6* 8.7   MG  --  1.9   , CMP   Recent Labs   Lab 04/04/23  0329 04/04/23  0549    138   K 4.0 3.8    106   CO2 23 22*   GLU 97 95   BUN 17 15   CREATININE 0.8 0.8   CALCIUM 8.6* 8.7   PROT 6.8 6.9   ALBUMIN 4.1 4.0   BILITOT 0.3 0.3   ALKPHOS 69 69   AST 23 21   ALT 25 25   ANIONGAP 11 10   , CBC   Recent Labs   Lab 04/04/23  0329 04/04/23  0549   WBC 5.19 5.61   HGB 15.0 15.2   HCT 43.2 43.7    199   , INR   Recent Labs   Lab 04/04/23  0549   INR 1.0   , Lipid Panel No results for input(s): CHOL, HDL, LDLCALC, TRIG, CHOLHDL in the last 48 hours., and Troponin   Recent Labs   Lab 04/04/23  0329 04/04/23  0549   TROPONINI 0.141* 0.108*       Significant Imaging: EKG:      Assessment and Plan:     NSTEMI (non-ST elevated myocardial infarction)  NSTEMI/ACS  CAD s/p p and mid LAD DESx 2 in 2012    --continue ASA Heparin gtt and statin and metorpolol  --keep NPO and IVF  --C today  --I have explained the risks, benefits, and alternatives of the procedure in detail with patient and family. The patient voices understanding and all questions have been addressed.  The patient agrees to proceed as planned.          VTE Risk Mitigation (From admission, onward)         Ordered     heparin 25,000 units in dextrose 5% (100 units/ml) IV bolus from bag - ADDITIONAL PRN BOLUS - 60 units/kg (max bolus 4000 units)  As needed (PRN)         Question:  Heparin Infusion Adjustment (DO NOT MODIFY ANSWER)  Answer:  [email or web address]\epic\Images\Pharmacy\HeparinInfusions\heparin LOW INTENSITY nomogram for [facility] [id number]    04/04/23 0508     heparin 25,000 units in dextrose 5% (100 units/ml) IV bolus from bag - ADDITIONAL PRN BOLUS - 30 units/kg (max bolus 4000 units)  As needed (PRN)        Question:  Heparin Infusion Adjustment (DO NOT MODIFY ANSWER)  Answer:  [email or web address]\epic\Images\Pharmacy\HeparinInfusions\heparin LOW INTENSITY nomogram for [facility] [id number]    04/04/23 0508     heparin 25,000 units in dextrose 5% 250 mL (100 units/mL) infusion LOW INTENSITY nomogram - [facility]  Continuous        Question Answer Comment   Heparin Infusion Adjustment (DO NOT MODIFY ANSWER) [email or web address]\epic\Images\Pharmacy\HeparinInfusions\heparin LOW INTENSITY nomogram for [facility] [id number]    Begin at (in units/kg/hr) 12        04/04/23 0508     IP VTE LOW RISK PATIENT  Once         04/04/23 0508     Place sequential compression device  Until discontinued         04/04/23 0508                Thank you for your consult. I will follow-up with patient. Please contact us if you have any additional questions.    Anam Dumas MD  Cardiology   [facility] - Emergency Dept.

## 2023-04-04 NOTE — INTERVAL H&P NOTE
The patient has been examined and the H&P has been reviewed:    I concur with the findings and no changes have occurred since H&P was written.    Procedure risks, benefits and alternative options discussed and understood by patient/family.          Active Hospital Problems    Diagnosis  POA    *Chest pain [R07.9]  Yes    Elevated troponin [R77.8]  Yes    Elevated blood pressure reading [R03.0]  Yes    CAD (coronary artery disease) [I25.10]  Yes    NSTEMI (non-ST elevated myocardial infarction) [I21.4]  Unknown      Resolved Hospital Problems   No resolved problems to display.

## 2023-04-04 NOTE — ASSESSMENT & PLAN NOTE
NSTEMI/ACS  CAD s/p p and mid LAD DESx 2 in 2012    --continue ASA Heparin gtt and statin and metorpolol  --keep NPO and IVF  --LHC today  --I have explained the risks, benefits, and alternatives of the procedure in detail with patient and family. The patient voices understanding and all questions have been addressed.  The patient agrees to proceed as planned.

## 2023-04-04 NOTE — DISCHARGE INSTRUCTIONS
ACTIVITY: AVOID activities that require bending of the affected arm/wrist for 3 days and submerging the site in water for 3 days. REMOVE the dressing the day after  the procedure, and shower.  Apply a bandaid to site after shower.  WEAR wrist immobilizer until tomorrow night.    You may RESUME your normal activities or prescribed exercise program as instructed by your physician after 3 days.                                                                                                        1. DIET: It is advisable for you to follow a diet that limits the intake of salt, sugar, saturated fats and cholesterol.     2. FOR THE NEXT 24 HOURS:   For the next 8 hours, you should be watched by a responsible adult. This person should make sure your condition is not getting worse.   Don't drink any alcohol for the next 24 hours.  Don't drive, operate dangerous machinery, or make important business or personal decisions during the next 24 hours.  Your healthcare provider may tell you not to take any medicine by mouth for pain or sleep in the next 4 hours. These medicines may react with the medicines you were given in the hospital. This could cause a much stronger response than usual.     3. ACTIVITY:                                Day of discharge:             NO vigorous activity, lifting or straining                                                   Day after discharge:         Avoid heavy lifting (up to 10 lbs)                                                                        The day after discharge you may shower, but avoid tub baths for 5 days                                                                         Wash site gently with soap and water        NO powder or lotion to your procedure site.                 Before you shower, remove dressing, apply bandaid if desired                                                                                                                                           "                                  2nd day after discharge:  Resume normal activities                                                                         Exercise program as instructed    4. WOUND CARE: It is not unusual to have a small amount of bruising appear in the groin area. It is also common to have a tender "knot" develop beneath the skin at the puncture site in the groin. This is scar tissue only and is not a cause for concern or alarm. This tender knot may take several weeks to fully resolve. The bruise will usually spread over several days. If the lump gets bigger, call you doctor immediately.    5. DISCOMFORT: For general discomfort at the puncture site, you may take 1 or 2 Acetaminophen (Tylenol) tablets every 4 hours as needed. (Do not take more than 4000 mg a day)              6. CALL YOUR HEALTHCARE PROVIDER IF YOU START TO HAVE THE FOLLOWING SYMPTOMS:        1. Problems with the affected leg: Pain, discomfort, loss of warmth, numbness or tingling                                                                                                                            2. Problems at the groin site: Bleeding, pain that is sudden/sharp/persistent,                   swelling at site or a change in "lump" size, increased redness or drainage at                     puncture site                                                               3. High fever (101 degrees or higher)       4.  Drowsiness that doesn't get better       5. Weakness or dizziness that doesn't get better            6. Repeated vomiting    7. GO TO  THE EMERGENCY ROOM OR CALL 911 IF YOU HAVE: Chest pains or discomforts not relieved with 3 nitroglycerin doses (sublingual tablets or spray), numbness or severe pain or if your foot or leg becomes cold or discolored or uncontrolled bleeding from site (apply direct pressure above site).   "

## 2023-04-04 NOTE — SUBJECTIVE & OBJECTIVE
Past Medical History:   Diagnosis Date    Coronary artery disease     Hypertension        Past Surgical History:   Procedure Laterality Date    CORONARY STENT PLACEMENT         Review of patient's allergies indicates:  No Known Allergies    No current facility-administered medications on file prior to encounter.     Current Outpatient Medications on File Prior to Encounter   Medication Sig    aspirin 81 MG Chew Take 81 mg by mouth once daily.    lisinopril (PRINIVIL,ZESTRIL) 2.5 MG tablet TAKE 1 TABLET BY MOUTH EVERY DAY    metoprolol tartrate (LOPRESSOR) 25 MG tablet TAKE ONE TABLET BY MOUTH TWICE DAILY    prasugrel (EFFIENT) 10 mg Tab TAKE 1 TABLET BY MOUTH EVERY DAY    simvastatin (ZOCOR) 40 MG tablet TAKE ONE TABLET BY MOUTH ONCE DAILY    simvastatin (ZOCOR) 40 MG tablet TAKE 1 TABLET BY MOUTH EVERY DAY    [DISCONTINUED] metoprolol tartrate (LOPRESSOR) 25 MG tablet TAKE 1 TABLET BY MOUTH TWICE DAILY    [DISCONTINUED] metoprolol tartrate (LOPRESSOR) 25 MG tablet TAKE 1 TABLET(25 MG) BY MOUTH TWICE DAILY    [DISCONTINUED] simvastatin (ZOCOR) 40 MG tablet Take 1 tablet (40 mg total) by mouth once daily. Patient must call office for appointment     Family History       Problem Relation (Age of Onset)    Heart disease Father          Tobacco Use    Smoking status: Former    Smokeless tobacco: Never   Substance and Sexual Activity    Alcohol use: No    Drug use: No    Sexual activity: Not on file     Review of Systems   Constitutional:  Positive for chills. Negative for appetite change, diaphoresis, fatigue and fever.   HENT:  Negative for congestion, nosebleeds, sore throat and trouble swallowing.    Eyes:  Negative for pain, discharge and visual disturbance.   Respiratory:  Negative for apnea, cough, chest tightness, shortness of breath, wheezing and stridor.    Cardiovascular:  Positive for chest pain. Negative for palpitations and leg swelling.   Gastrointestinal:  Negative for abdominal distention, abdominal  pain, blood in stool, constipation, diarrhea, nausea and vomiting.   Endocrine: Negative for cold intolerance and heat intolerance.   Genitourinary:  Negative for difficulty urinating, dysuria, flank pain, frequency and urgency.   Musculoskeletal:  Negative for arthralgias, back pain, joint swelling, myalgias, neck pain and neck stiffness.   Skin:  Negative for rash and wound.   Allergic/Immunologic: Negative for food allergies and immunocompromised state.   Neurological:  Negative for dizziness, seizures, syncope, facial asymmetry, weakness, light-headedness and headaches.   Hematological:  Negative for adenopathy.   Psychiatric/Behavioral:  Negative for agitation, behavioral problems and confusion. The patient is not nervous/anxious.    Objective:     Vital Signs (Most Recent):  Temp: 97.6 °F (36.4 °C) (04/04/23 0255)  Pulse: 72 (04/04/23 0554)  Resp: 17 (04/04/23 0409)  BP: (!) 152/85 (04/04/23 0554)  SpO2: 97 % (04/04/23 0409)   Vital Signs (24h Range):  Temp:  [97.6 °F (36.4 °C)] 97.6 °F (36.4 °C)  Pulse:  [66-88] 72  Resp:  [16-20] 17  SpO2:  [96 %-99 %] 97 %  BP: (132-167)/(81-95) 152/85     Weight: 80 kg (176 lb 5.9 oz)  Body mass index is 27.62 kg/m².    Physical Exam  Vitals and nursing note reviewed.   Constitutional:       General: He is not in acute distress.     Appearance: He is well-developed. He is not diaphoretic.   HENT:      Head: Normocephalic and atraumatic.      Nose: Nose normal.   Eyes:      General: No scleral icterus.     Conjunctiva/sclera: Conjunctivae normal.   Cardiovascular:      Rate and Rhythm: Normal rate and regular rhythm.      Heart sounds: Normal heart sounds. No murmur heard.    No friction rub. No gallop.   Pulmonary:      Effort: Pulmonary effort is normal. No respiratory distress.      Breath sounds: Normal breath sounds. No stridor. No wheezing or rales.   Chest:      Chest wall: No tenderness.   Abdominal:      General: Bowel sounds are normal. There is no distension.       Palpations: Abdomen is soft.      Tenderness: There is no abdominal tenderness. There is no guarding or rebound.   Musculoskeletal:         General: No tenderness or deformity. Normal range of motion.      Cervical back: Normal range of motion and neck supple.   Skin:     General: Skin is warm and dry.      Coloration: Skin is not pale.      Findings: No erythema or rash.   Neurological:      Mental Status: He is alert and oriented to person, place, and time.      Cranial Nerves: No cranial nerve deficit.      Motor: No abnormal muscle tone.      Coordination: Coordination normal.      Deep Tendon Reflexes: Reflexes are normal and symmetric.   Psychiatric:         Behavior: Behavior normal.         Thought Content: Thought content normal.           Significant Labs: All pertinent labs within the past 24 hours have been reviewed.    Significant Imaging: I have reviewed all pertinent imaging results/findings within the past 24 hours.

## 2023-04-04 NOTE — ED PROVIDER NOTES
SCRIBE #1 NOTE: ITorie, am scribing for, and in the presence of, Jolanta Vargas Do, MD. I have scribed the entire note.       History     Chief Complaint   Patient presents with    Chest Pain     Pt reports waking with SS CP (sharp L side) non-radiating. Reported numbness in arms. Pt took 3x 81 mg asa PTA. Pt reports pain has resolved. Reports stent placement 12 yrs ago.     Review of patient's allergies indicates:  No Known Allergies      History of Present Illness     HPI    4/4/2023, 3:51 AM  History obtained from the patient      History of Present Illness: Solomon Joseph is a 57 y.o. male patient with a PMHx of CAD and HTN who presents to the Emergency Department for evaluation of left sided CP which onset PTA. Pt describes pain as sharp which woke him from sleep, lasting about 2-3 seconds. Symptoms are episodic and moderate in severity. Pt is currently not in any pain. Pt reports lifting heavy weights at the gym earlier this week. Pt reports having and MI 12 years ago and had 2 stents placed. Pt has not seen a cardiologist since.  He currently only takes aspirin and no other medication.  No mitigating or exacerbating factors reported. Associated sxs include chills. Patient denies any back pain, N/V/D, headaches, dizziness, lightheadedness, general weakness, and all other sxs at this time. Prior Tx includes 3x 81 mg ASA. No further complaints or concerns at this time.       Arrival mode: Personal vehicle     PCP: Primary Doctor No        Past Medical History:  Past Medical History:   Diagnosis Date    Coronary artery disease     Hypertension        Past Surgical History:  Past Surgical History:   Procedure Laterality Date    CORONARY STENT PLACEMENT           Family History:  Family History   Problem Relation Age of Onset    Heart disease Father        Social History:  Social History     Tobacco Use    Smoking status: Former    Smokeless tobacco: Never   Substance and Sexual Activity    Alcohol use: No     Drug use: No    Sexual activity: Not on file        Review of Systems     Review of Systems   Constitutional:  Positive for chills. Negative for fever.   HENT:  Negative for sore throat.    Respiratory:  Negative for shortness of breath.    Cardiovascular:  Positive for chest pain (left sided).   Gastrointestinal:  Negative for diarrhea, nausea and vomiting.   Genitourinary:  Negative for dysuria.   Musculoskeletal:  Negative for back pain.   Skin:  Negative for rash.   Neurological:  Negative for dizziness, weakness (generalized), light-headedness and headaches.   Hematological:  Does not bruise/bleed easily.   All other systems reviewed and are negative.     Physical Exam     Initial Vitals [04/04/23 0255]   BP Pulse Resp Temp SpO2   (!) 166/89 88 16 97.6 °F (36.4 °C) 99 %      MAP       --          Physical Exam  Nursing Notes and Vital Signs Reviewed.  Constitutional: Patient is in no acute distress. Well-developed and well-nourished.  Head: Atraumatic. Normocephalic.  Eyes: PERRL. EOM intact. Conjunctivae are not pale. No scleral icterus.  ENT: Mucous membranes are moist. Oropharynx is clear and symmetric.    Neck: Supple. Full ROM. No lymphadenopathy.  Cardiovascular: Regular rate. Regular rhythm. No murmurs, rubs, or gallops. Distal pulses are 2+ and symmetric.  Pulmonary/Chest: No respiratory distress. Clear to auscultation bilaterally. No wheezing or rales.  Abdominal: Soft and non-distended.  There is no tenderness.  No rebound, guarding, or rigidity. Good bowel sounds.  Genitourinary: No CVA tenderness  Musculoskeletal: Moves all extremities. No obvious deformities. No edema. No calf tenderness.  Skin: Warm and dry.  Neurological:  Alert, awake, and appropriate.  Normal speech.  No acute focal neurological deficits are appreciated.  Psychiatric: Normal affect. Good eye contact. Appropriate in content.     ED Course   Critical Care    Date/Time: 4/4/2023 5:01 AM  Performed by: Jolanta Vargas Do  "MD  Authorized by: Jolanta Vargas Do, MD   Direct patient critical care time: 14 minutes  Additional history critical care time: 5 minutes  Ordering / reviewing critical care time: 7 minutes  Documentation critical care time: 6 minutes  Consulting other physicians critical care time: 4 minutes  Consult with family critical care time: 5 minutes  Total critical care time (exclusive of procedural time) : 41 minutes  Critical care time was exclusive of separately billable procedures and treating other patients and teaching time.  Critical care was necessary to treat or prevent imminent or life-threatening deterioration of the following conditions: Nonstemi.  Critical care was time spent personally by me on the following activities: blood draw for specimens, development of treatment plan with patient or surrogate, discussions with consultants, interpretation of cardiac output measurements, evaluation of patient's response to treatment, obtaining history from patient or surrogate, examination of patient, ordering and performing treatments and interventions, ordering and review of laboratory studies, ordering and review of radiographic studies, pulse oximetry, re-evaluation of patient's condition and review of old charts.      ED Vital Signs:  Vitals:    04/04/23 0255 04/04/23 0310 04/04/23 0326 04/04/23 0402   BP: (!) 166/89  (!) 167/95 132/81   Pulse: 88 72 71 68   Resp: 16  18 20   Temp: 97.6 °F (36.4 °C)      TempSrc: Oral      SpO2: 99%   96%   Weight: 80 kg (176 lb 5.9 oz)      Height: 5' 7" (1.702 m)       04/04/23 0409 04/04/23 0500   BP:  (!) 152/85   Pulse: 67 66   Resp: 17    Temp:     TempSrc:     SpO2: 97%    Weight:     Height:         Abnormal Lab Results:  Labs Reviewed   COMPREHENSIVE METABOLIC PANEL - Abnormal; Notable for the following components:       Result Value    Calcium 8.6 (*)     All other components within normal limits   TROPONIN I - Abnormal; Notable for the following components:    Troponin " I 0.141 (*)     All other components within normal limits   HIV 1 / 2 ANTIBODY    Narrative:     Release to patient->Immediate   HEPATITIS C ANTIBODY    Narrative:     Release to patient->Immediate   HEP C VIRUS HOLD SPECIMEN    Narrative:     Release to patient->Immediate   CBC W/ AUTO DIFFERENTIAL   B-TYPE NATRIURETIC PEPTIDE   TROPONIN I   COMPREHENSIVE METABOLIC PANEL   MAGNESIUM   PHOSPHORUS   CBC W/ AUTO DIFFERENTIAL   APTT   PROTIME-INR   APTT   PROTIME-INR   CBC W/ AUTO DIFFERENTIAL        All Lab Results:  Results for orders placed or performed during the hospital encounter of 04/04/23   HIV 1/2 Ag/Ab (4th Gen)   Result Value Ref Range    HIV 1/2 Ag/Ab Negative Negative   Hepatitis C Antibody   Result Value Ref Range    Hepatitis C Ab Negative Negative   HCV Virus Hold Specimen   Result Value Ref Range    HEP C Virus Hold Specimen Hold for HCV sendout    CBC auto differential   Result Value Ref Range    WBC 5.19 3.90 - 12.70 K/uL    RBC 5.05 4.60 - 6.20 M/uL    Hemoglobin 15.0 14.0 - 18.0 g/dL    Hematocrit 43.2 40.0 - 54.0 %    MCV 86 82 - 98 fL    MCH 29.7 27.0 - 31.0 pg    MCHC 34.7 32.0 - 36.0 g/dL    RDW 12.0 11.5 - 14.5 %    Platelets 194 150 - 450 K/uL    MPV 10.9 9.2 - 12.9 fL    Immature Granulocytes 0.2 0.0 - 0.5 %    Gran # (ANC) 2.3 1.8 - 7.7 K/uL    Immature Grans (Abs) 0.01 0.00 - 0.04 K/uL    Lymph # 2.1 1.0 - 4.8 K/uL    Mono # 0.5 0.3 - 1.0 K/uL    Eos # 0.3 0.0 - 0.5 K/uL    Baso # 0.06 0.00 - 0.20 K/uL    nRBC 0 0 /100 WBC    Gran % 44.2 38.0 - 73.0 %    Lymph % 39.9 18.0 - 48.0 %    Mono % 8.9 4.0 - 15.0 %    Eosinophil % 5.6 0.0 - 8.0 %    Basophil % 1.2 0.0 - 1.9 %    Differential Method Automated    Comprehensive metabolic panel   Result Value Ref Range    Sodium 139 136 - 145 mmol/L    Potassium 4.0 3.5 - 5.1 mmol/L    Chloride 105 95 - 110 mmol/L    CO2 23 23 - 29 mmol/L    Glucose 97 70 - 110 mg/dL    BUN 17 6 - 20 mg/dL    Creatinine 0.8 0.5 - 1.4 mg/dL    Calcium 8.6 (L) 8.7 -  10.5 mg/dL    Total Protein 6.8 6.0 - 8.4 g/dL    Albumin 4.1 3.5 - 5.2 g/dL    Total Bilirubin 0.3 0.1 - 1.0 mg/dL    Alkaline Phosphatase 69 55 - 135 U/L    AST 23 10 - 40 U/L    ALT 25 10 - 44 U/L    Anion Gap 11 8 - 16 mmol/L    eGFR >60 >60 mL/min/1.73 m^2   Troponin I #1   Result Value Ref Range    Troponin I 0.141 (H) 0.000 - 0.026 ng/mL   BNP   Result Value Ref Range    BNP 32 0 - 99 pg/mL         Imaging Results:  Imaging Results              X-Ray Chest AP Portable (In process)                      The EKG was ordered, reviewed, and independently interpreted by the ED provider.  Interpretation time: 2:59  Rate: 72 BPM  Rhythm: normal sinus rhythm  Interpretation: No acute ST changes. No STEMI.           The Emergency Provider reviewed the vital signs and test results, which are outlined above.     ED Discussion     5:10 AM: Discussed case with Johana Singh NP ([facility]). Dr. Beltrán agrees with current care and management of pt and accepts admission.   Admitting Service: Hospital Medicine  Admitting Physician: Dr. Beltrán  Admit to: Obs med/tele    5:10 AM: Re-evaluated pt. I have discussed test results, shared treatment plan, and the need for admission with patient and family at bedside. Pt and family express understanding at this time and agree with all information. All questions answered. Pt and family have no further questions or concerns at this time. Pt is ready for admit.        Medical Decision Making:   Initial Assessment:   Patient with history of stents placed 12 years ago with chest pain last night  Differential Diagnosis:   Differential diagnoses:  Heart attack, angina, pneumonia, asthma, infection, aortic dissection, pneumothorax, pericarditis , pleural effusion, GERD, pancreatitis, gallstone pain, cholelithiasis or cholecystitis, esophageal rupture, bowel perforation, gastritis, nonspecific chest pain, musculoskeletal chest pain, costochondritis    Clinical Tests:   Lab Tests:  Ordered and Reviewed  Radiological Study: Ordered and Reviewed  Medical Tests: Ordered and Reviewed  ED Management:  Patient had cardiac workup done EKG chest x-ray within normal limits.  Labs were all normal except the his troponin was elevated 0.141.  Consistent with non-STEMI.  He did not have any chest pain in the emergency room.  He is had 2 stents placed roughly 12 years ago with no follow-up with cardiology.    I did talk to hospital medicine services and Dr. Beltrán will admit him to the hospital.  We will start IV heparin.   Medical decision-making was made with the patient and his wife         ED Medication(s):  Medications   aspirin chewable tablet 81 mg (has no administration in time range)   metoprolol tartrate (LOPRESSOR) tablet 25 mg (has no administration in time range)   atorvastatin tablet 40 mg (has no administration in time range)   sodium chloride 0.9% flush 10 mL (has no administration in time range)   albuterol-ipratropium 2.5 mg-0.5 mg/3 mL nebulizer solution 3 mL (has no administration in time range)   melatonin tablet 6 mg (has no administration in time range)   ondansetron injection 4 mg (has no administration in time range)   prochlorperazine injection Soln 5 mg (has no administration in time range)   polyethylene glycol packet 17 g (has no administration in time range)   senna-docusate 8.6-50 mg per tablet 1 tablet (has no administration in time range)   simethicone chewable tablet 80 mg (has no administration in time range)   aluminum-magnesium hydroxide-simethicone 200-200-20 mg/5 mL suspension 30 mL (has no administration in time range)   acetaminophen tablet 650 mg (has no administration in time range)   HYDROcodone-acetaminophen 5-325 mg per tablet 1 tablet (has no administration in time range)   naloxone 0.4 mg/mL injection 0.02 mg (has no administration in time range)   potassium bicarbonate disintegrating tablet 50 mEq (has no administration in time range)   potassium bicarbonate  disintegrating tablet 35 mEq (has no administration in time range)   potassium bicarbonate disintegrating tablet 60 mEq (has no administration in time range)   magnesium oxide tablet 800 mg (has no administration in time range)   magnesium oxide tablet 800 mg (has no administration in time range)   potassium, sodium phosphates 280-160-250 mg packet 2 packet (has no administration in time range)   potassium, sodium phosphates 280-160-250 mg packet 2 packet (has no administration in time range)   potassium, sodium phosphates 280-160-250 mg packet 2 packet (has no administration in time range)   glucose chewable tablet 16 g (has no administration in time range)   glucose chewable tablet 24 g (has no administration in time range)   dextrose 10% bolus 125 mL 125 mL (has no administration in time range)   dextrose 10% bolus 250 mL 250 mL (has no administration in time range)   glucagon (human recombinant) injection 1 mg (has no administration in time range)   heparin 25,000 units in dextrose 5% (100 units/ml) IV bolus from bag INITIAL BOLUS (max bolus 4000 units) (has no administration in time range)   heparin 25,000 units in dextrose 5% 250 mL (100 units/mL) infusion LOW INTENSITY nomogram - [facility] (has no administration in time range)   heparin 25,000 units in dextrose 5% (100 units/ml) IV bolus from bag - ADDITIONAL PRN BOLUS - 60 units/kg (max bolus 4000 units) (has no administration in time range)   heparin 25,000 units in dextrose 5% (100 units/ml) IV bolus from bag - ADDITIONAL PRN BOLUS - 30 units/kg (max bolus 4000 units) (has no administration in time range)   heparin 25,000 units in dextrose 5% (100 units/ml) IV bolus from bag INITIAL BOLUS (max bolus 4000 units) (has no administration in time range)   heparin 25,000 units in dextrose 5% 250 mL (100 units/mL) infusion LOW INTENSITY nomogram - [facility] (has no administration in time range)   heparin 25,000 units in dextrose 5% (100 units/ml) IV bolus from bag -  ADDITIONAL PRN BOLUS - 60 units/kg (max bolus 4000 units) (has no administration in time range)   heparin 25,000 units in dextrose 5% (100 units/ml) IV bolus from bag - ADDITIONAL PRN BOLUS - 30 units/kg (max bolus 4000 units) (has no administration in time range)       New Prescriptions    No medications on file               Scribe Attestation:   Scribe #1: I performed the above scribed service and the documentation accurately describes the services I performed. I attest to the accuracy of the note.     Attending:   Physician Attestation Statement for Scribe #1: I, Jolanta Vargas Do, MD, personally performed the services described in this documentation, as scribed by Torie Lee, in my presence, and it is both accurate and complete.           Clinical Impression       ICD-10-CM ICD-9-CM   1. Chest pain, unspecified type  R07.9 786.50   2. Chest pain  R07.9 786.50   3. NSTEMI (non-ST elevated myocardial infarction)  I21.4 410.70       Disposition:   Disposition: Placed in Observation  Condition: Fair       Jolanta Vargas Do, MD  04/04/23 0525

## 2023-04-05 VITALS
TEMPERATURE: 98 F | HEART RATE: 65 BPM | SYSTOLIC BLOOD PRESSURE: 131 MMHG | WEIGHT: 174.81 LBS | RESPIRATION RATE: 18 BRPM | OXYGEN SATURATION: 96 % | DIASTOLIC BLOOD PRESSURE: 77 MMHG | HEIGHT: 67 IN | BODY MASS INDEX: 27.44 KG/M2

## 2023-04-05 DIAGNOSIS — I25.10 CORONARY ARTERY DISEASE, UNSPECIFIED VESSEL OR LESION TYPE, UNSPECIFIED WHETHER ANGINA PRESENT, UNSPECIFIED WHETHER NATIVE OR TRANSPLANTED HEART: Primary | ICD-10-CM

## 2023-04-05 LAB
ALBUMIN SERPL BCP-MCNC: 3.8 G/DL (ref 3.5–5.2)
ALP SERPL-CCNC: 76 U/L (ref 55–135)
ALT SERPL W/O P-5'-P-CCNC: 25 U/L (ref 10–44)
ANION GAP SERPL CALC-SCNC: 10 MMOL/L (ref 8–16)
ANION GAP SERPL CALC-SCNC: 10 MMOL/L (ref 8–16)
AST SERPL-CCNC: 20 U/L (ref 10–40)
BASOPHILS # BLD AUTO: 0.04 K/UL (ref 0–0.2)
BASOPHILS NFR BLD: 0.6 % (ref 0–1.9)
BILIRUB SERPL-MCNC: 0.3 MG/DL (ref 0.1–1)
BUN SERPL-MCNC: 16 MG/DL (ref 6–20)
BUN SERPL-MCNC: 16 MG/DL (ref 6–20)
CALCIUM SERPL-MCNC: 8.9 MG/DL (ref 8.7–10.5)
CALCIUM SERPL-MCNC: 8.9 MG/DL (ref 8.7–10.5)
CHLORIDE SERPL-SCNC: 107 MMOL/L (ref 95–110)
CHLORIDE SERPL-SCNC: 107 MMOL/L (ref 95–110)
CHOLEST SERPL-MCNC: 175 MG/DL (ref 120–199)
CHOLEST/HDLC SERPL: 4.6 {RATIO} (ref 2–5)
CO2 SERPL-SCNC: 22 MMOL/L (ref 23–29)
CO2 SERPL-SCNC: 22 MMOL/L (ref 23–29)
CREAT SERPL-MCNC: 0.8 MG/DL (ref 0.5–1.4)
CREAT SERPL-MCNC: 0.9 MG/DL (ref 0.5–1.4)
DIFFERENTIAL METHOD: NORMAL
EOSINOPHIL # BLD AUTO: 0.2 K/UL (ref 0–0.5)
EOSINOPHIL NFR BLD: 3.3 % (ref 0–8)
ERYTHROCYTE [DISTWIDTH] IN BLOOD BY AUTOMATED COUNT: 12.1 % (ref 11.5–14.5)
EST. GFR  (NO RACE VARIABLE): >60 ML/MIN/1.73 M^2
EST. GFR  (NO RACE VARIABLE): >60 ML/MIN/1.73 M^2
ESTIMATED AVG GLUCOSE: 100 MG/DL (ref 68–131)
GLUCOSE SERPL-MCNC: 96 MG/DL (ref 70–110)
GLUCOSE SERPL-MCNC: 98 MG/DL (ref 70–110)
HBA1C MFR BLD: 5.1 % (ref 4–5.6)
HCT VFR BLD AUTO: 46.3 % (ref 40–54)
HDLC SERPL-MCNC: 38 MG/DL (ref 40–75)
HDLC SERPL: 21.7 % (ref 20–50)
HGB BLD-MCNC: 15.5 G/DL (ref 14–18)
IMM GRANULOCYTES # BLD AUTO: 0.03 K/UL (ref 0–0.04)
IMM GRANULOCYTES NFR BLD AUTO: 0.4 % (ref 0–0.5)
LDLC SERPL CALC-MCNC: 102.4 MG/DL (ref 63–159)
LYMPHOCYTES # BLD AUTO: 1.9 K/UL (ref 1–4.8)
LYMPHOCYTES NFR BLD: 26.9 % (ref 18–48)
MAGNESIUM SERPL-MCNC: 1.9 MG/DL (ref 1.6–2.6)
MCH RBC QN AUTO: 29.1 PG (ref 27–31)
MCHC RBC AUTO-ENTMCNC: 33.5 G/DL (ref 32–36)
MCV RBC AUTO: 87 FL (ref 82–98)
MONOCYTES # BLD AUTO: 0.6 K/UL (ref 0.3–1)
MONOCYTES NFR BLD: 8 % (ref 4–15)
NEUTROPHILS # BLD AUTO: 4.2 K/UL (ref 1.8–7.7)
NEUTROPHILS NFR BLD: 60.8 % (ref 38–73)
NONHDLC SERPL-MCNC: 137 MG/DL
NRBC BLD-RTO: 0 /100 WBC
PHOSPHATE SERPL-MCNC: 3.1 MG/DL (ref 2.7–4.5)
PLATELET # BLD AUTO: 203 K/UL (ref 150–450)
PMV BLD AUTO: 11.3 FL (ref 9.2–12.9)
POC ACTIVATED CLOTTING TIME K: 197 SEC (ref 74–137)
POC ACTIVATED CLOTTING TIME K: 209 SEC (ref 74–137)
POC ACTIVATED CLOTTING TIME K: 227 SEC (ref 74–137)
POC ACTIVATED CLOTTING TIME K: 257 SEC (ref 74–137)
POCT GLUCOSE: 105 MG/DL (ref 70–110)
POCT GLUCOSE: 91 MG/DL (ref 70–110)
POTASSIUM SERPL-SCNC: 4.1 MMOL/L (ref 3.5–5.1)
POTASSIUM SERPL-SCNC: 4.3 MMOL/L (ref 3.5–5.1)
PROT SERPL-MCNC: 6.6 G/DL (ref 6–8.4)
RBC # BLD AUTO: 5.32 M/UL (ref 4.6–6.2)
SAMPLE: ABNORMAL
SODIUM SERPL-SCNC: 139 MMOL/L (ref 136–145)
SODIUM SERPL-SCNC: 139 MMOL/L (ref 136–145)
TRIGL SERPL-MCNC: 173 MG/DL (ref 30–150)
WBC # BLD AUTO: 6.96 K/UL (ref 3.9–12.7)

## 2023-04-05 PROCEDURE — 99232 PR SUBSEQUENT HOSPITAL CARE,LEVL II: ICD-10-PCS | Mod: ,,,

## 2023-04-05 PROCEDURE — 21400001 HC TELEMETRY ROOM

## 2023-04-05 PROCEDURE — 80061 LIPID PANEL: CPT | Performed by: INTERNAL MEDICINE

## 2023-04-05 PROCEDURE — 80053 COMPREHEN METABOLIC PANEL: CPT | Performed by: INTERNAL MEDICINE

## 2023-04-05 PROCEDURE — 25000003 PHARM REV CODE 250: Performed by: INTERNAL MEDICINE

## 2023-04-05 PROCEDURE — 83036 HEMOGLOBIN GLYCOSYLATED A1C: CPT

## 2023-04-05 PROCEDURE — 94761 N-INVAS EAR/PLS OXIMETRY MLT: CPT

## 2023-04-05 PROCEDURE — 83735 ASSAY OF MAGNESIUM: CPT | Performed by: INTERNAL MEDICINE

## 2023-04-05 PROCEDURE — 36415 COLL VENOUS BLD VENIPUNCTURE: CPT | Performed by: INTERNAL MEDICINE

## 2023-04-05 PROCEDURE — 99232 SBSQ HOSP IP/OBS MODERATE 35: CPT | Mod: ,,,

## 2023-04-05 PROCEDURE — 36415 COLL VENOUS BLD VENIPUNCTURE: CPT

## 2023-04-05 PROCEDURE — 84100 ASSAY OF PHOSPHORUS: CPT | Performed by: INTERNAL MEDICINE

## 2023-04-05 PROCEDURE — 99900035 HC TECH TIME PER 15 MIN (STAT)

## 2023-04-05 PROCEDURE — 85025 COMPLETE CBC W/AUTO DIFF WBC: CPT | Performed by: INTERNAL MEDICINE

## 2023-04-05 PROCEDURE — 80048 BASIC METABOLIC PNL TOTAL CA: CPT | Mod: XB | Performed by: INTERNAL MEDICINE

## 2023-04-05 RX ORDER — METOPROLOL TARTRATE 25 MG/1
25 TABLET, FILM COATED ORAL 2 TIMES DAILY
Qty: 60 TABLET | Refills: 0 | Status: SHIPPED | OUTPATIENT
Start: 2023-04-05 | End: 2023-04-09

## 2023-04-05 RX ORDER — ATORVASTATIN CALCIUM 40 MG/1
80 TABLET, FILM COATED ORAL DAILY
Status: DISCONTINUED | OUTPATIENT
Start: 2023-04-06 | End: 2023-04-05 | Stop reason: HOSPADM

## 2023-04-05 RX ORDER — ATORVASTATIN CALCIUM 80 MG/1
80 TABLET, FILM COATED ORAL DAILY
Qty: 30 TABLET | Refills: 0 | Status: SHIPPED | OUTPATIENT
Start: 2023-04-06 | End: 2023-04-09 | Stop reason: SDUPTHER

## 2023-04-05 RX ORDER — ATORVASTATIN CALCIUM 40 MG/1
40 TABLET, FILM COATED ORAL DAILY
Qty: 90 TABLET | Refills: 0 | Status: CANCELLED | OUTPATIENT
Start: 2023-04-06 | End: 2023-07-05

## 2023-04-05 RX ADMIN — ATORVASTATIN CALCIUM 40 MG: 40 TABLET, FILM COATED ORAL at 09:04

## 2023-04-05 RX ADMIN — ASPIRIN 81 MG: 81 TABLET, COATED ORAL at 09:04

## 2023-04-05 RX ADMIN — TICAGRELOR 90 MG: 90 TABLET ORAL at 09:04

## 2023-04-05 RX ADMIN — METOPROLOL TARTRATE 25 MG: 25 TABLET, FILM COATED ORAL at 09:04

## 2023-04-05 NOTE — PLAN OF CARE
[facility] - Telemetry (Hospital)  Initial Discharge Assessment       Primary Care Provider: Tunde Feliz MD    Admission Diagnosis: Elevated troponin [R77.8]  NSTEMI (non-ST elevated myocardial infarction) [I21.4]  Chest pain [R07.9]  Chest pain, unspecified type [R07.9]    Admission Date: 4/4/2023  Expected Discharge Date: 4/5/2023    Discharge Barriers Identified: None    Payor: BLUE CROSS BLUE SHIELD / Plan: [place] PPO / Product Type: PPO /     Extended Emergency Contact Information  Primary Emergency Contact: JakeTia  Address: [address] [address] .           [place] [address] [facility]  Home Phone: [phone]  Mobile Phone: [phone]  Relation: Spouse    Discharge Plan A: Home with family         [facility] - [place], [place] - [address]  [address] [facility] [address]  Phone: [phone] Fax: [phone]    [id number] - [place], [place] - [address] [facility] [address]  [address]  [facility] [address]  Phone: [phone] Fax: [phone]      Initial Assessment (most recent)       Adult Discharge Assessment - 04/05/23 1254          Discharge Assessment    Assessment Type Discharge Planning Assessment     Confirmed/corrected address, phone number and insurance Yes     Confirmed Demographics Correct on Facesheet     Source of Information patient     When was your last doctors appointment? --   unknown - no PCP    Communicated DAVIE with patient/caregiver Yes     Reason For Admission chest pain     People in Home spouse     Facility Arrived From: home     Do you expect to return to your current living situation? Yes     Do you have help at home or someone to help you manage your care at home? No     Who are your caregiver(s) and their phone number(s)? patient is independent     Prior to hospitilization cognitive status: Alert/Oriented     Current cognitive status: Alert/Oriented     Home  Layout Able to live on 1st floor     Equipment Currently Used at Home none     Readmission within 30 days? No     Patient currently being followed by outpatient case management? No     Do you currently have service(s) that help you manage your care at home? No     Do you take prescription medications? No     Do you have prescription coverage? Yes     Coverage BCBS     Do you have any problems affording any of your prescribed medications? TBD     Who is going to help you get home at discharge? spouse     How do you get to doctors appointments? car, drives self     Are you on dialysis? No     Do you take coumadin? No     Discharge Plan A Home with family     DME Needed Upon Discharge  none     Discharge Plan discussed with: Patient     Discharge Barriers Identified None                   Met with patient.  He lives with his spouse.  He is independent with ADL's.  Patient needs a PCP.  No other discharge needs at present.

## 2023-04-05 NOTE — PLAN OF CARE
Patient updated on plan of care. Instructed  patient to use call light, call light within reach. Hourly rounding performed. Fall precautions maintained; bed alarm on. Vitals [id number]. Chart check complete. Education provided, questions encouraged. Rhythm-sinus on tele box # [id number]. Anticipated discharge today.    Problem: Adult Inpatient Plan of Care  Goal: Plan of Care Review  Outcome: Met  Goal: Patient-Specific Goal (Individualized)  Outcome: Met  Goal: Absence of Hospital-Acquired Illness or Injury  Outcome: Met  Goal: Optimal Comfort and Wellbeing  Outcome: Met  Goal: Readiness for Transition of Care  Outcome: Met     Problem: Infection  Goal: Absence of Infection Signs and Symptoms  Outcome: Met

## 2023-04-05 NOTE — SUBJECTIVE & OBJECTIVE
Review of Systems   Constitutional: Negative.   HENT: Negative.     Eyes: Negative.    Cardiovascular: Negative.    Respiratory: Negative.     Skin: Negative.    Musculoskeletal: Negative.    Gastrointestinal: Negative.    Genitourinary: Negative.    Neurological: Negative.    Psychiatric/Behavioral: Negative.     Objective:     Vital Signs (Most Recent):  Temp: 97.9 °F (36.6 °C) (04/05/23 1129)  Pulse: 65 (04/05/23 1129)  Resp: 18 (04/05/23 1129)  BP: 131/77 (04/05/23 1129)  SpO2: 96 % (04/05/23 1129) Vital Signs (24h Range):  Temp:  [97.5 °F (36.4 °C)-97.9 °F (36.6 °C)] 97.9 °F (36.6 °C)  Pulse:  [51-82] 65  Resp:  [15-18] 18  SpO2:  [96 %-100 %] 96 %  BP: (113-163)/(63-85) 131/77     Weight: 79.3 kg (174 lb 13.2 oz)  Body mass index is 27.38 kg/m².     SpO2: 96 %         Intake/Output Summary (Last 24 hours) at 4/5/2023 1224  Last data filed at 4/5/2023 0440  Gross per 24 hour   Intake 752.23 ml   Output --   Net 752.23 ml       Lines/Drains/Airways       None                   Physical Exam  Vitals and nursing note reviewed.   Constitutional:       Appearance: Normal appearance.   HENT:      Head: Normocephalic and atraumatic.   Eyes:      General:         Right eye: No discharge.         Left eye: No discharge.      Pupils: Pupils are equal, round, and reactive to light.   Cardiovascular:      Rate and Rhythm: Normal rate and regular rhythm.      Heart sounds: S1 normal and S2 normal. No murmur heard.    No friction rub.   Pulmonary:      Effort: Pulmonary effort is normal. No respiratory distress.      Breath sounds: Normal breath sounds. No rales.   Abdominal:      Palpations: Abdomen is soft.      Tenderness: There is no abdominal tenderness.   Musculoskeletal:      Cervical back: Neck supple.      Right lower leg: No edema.      Left lower leg: No edema.   Skin:     General: Skin is warm and dry.   Neurological:      General: No focal deficit present.      Mental Status: He is alert and oriented to  person, place, and time.   Psychiatric:         Mood and Affect: Mood normal.         Behavior: Behavior normal.         Thought Content: Thought content normal.       Significant Labs: BMP:   Recent Labs   Lab 04/04/23  0329 04/04/23  0549 04/05/23  0502   GLU 97 95 96  98    138 139  139   K 4.0 3.8 4.3  4.1    106 107  107   CO2 23 22* 22*  22*   BUN 17 15 16  16   CREATININE 0.8 0.8 0.9  0.8   CALCIUM 8.6* 8.7 8.9  8.9   MG  --  1.9 1.9   , CMP   Recent Labs   Lab 04/04/23  0329 04/04/23  0549 04/05/23  0502    138 139  139   K 4.0 3.8 4.3  4.1    106 107  107   CO2 23 22* 22*  22*   GLU 97 95 96  98   BUN 17 15 16  16   CREATININE 0.8 0.8 0.9  0.8   CALCIUM 8.6* 8.7 8.9  8.9   PROT 6.8 6.9 6.6   ALBUMIN 4.1 4.0 3.8   BILITOT 0.3 0.3 0.3   ALKPHOS 69 69 76   AST 23 21 20   ALT 25 25 25   ANIONGAP 11 10 10  10   , CBC   Recent Labs   Lab 04/04/23 0329 04/04/23  0549 04/05/23  0502   WBC 5.19 5.61 6.96   HGB 15.0 15.2 15.5   HCT 43.2 43.7 46.3    199 203   , INR   Recent Labs   Lab 04/04/23  0549   INR 1.0   , Lipid Panel No results for input(s): CHOL, HDL, LDLCALC, TRIG, CHOLHDL in the last 48 hours., Troponin   Recent Labs   Lab 04/04/23  0329 04/04/23  0549 04/04/23  1843   TROPONINI 0.141* 0.108* 0.099*   , and All pertinent lab results from the last 24 hours have been reviewed.    Significant Imaging: Cardiac Cath: reviewed, Echocardiogram: Transthoracic echo (TTE) complete (Cupid Only):   Results for orders placed or performed during the hospital encounter of 04/04/23   Echo   Result Value Ref Range    BSA 1.94 m2    TDI SEPTAL 0.10 m/s    LV LATERAL E/E' RATIO 5.92 m/s    LV SEPTAL E/E' RATIO 7.70 m/s    LA WIDTH 3.80 cm    IVC diameter 1.33 cm    Left Ventricular Outflow Tract Mean Velocity 0.56 cm/s    Left Ventricular Outflow Tract Mean Gradient 1.39 mmHg    TDI LATERAL 0.13 m/s    LVIDd 4.55 3.5 - 6.0 cm    IVS 1.09 0.6 - 1.1 cm    Posterior Wall 1.12 (A)  0.6 - 1.1 cm    Ao root annulus 3.34 cm    LVIDs 3.16 2.1 - 4.0 cm    FS 31 28 - 44 %    LA volume 55.34 cm3    STJ 3.40 cm    Ascending aorta 3.58 cm    LV mass 179.25 g    LA size 3.19 cm    TAPSE 2.00 cm    Left Ventricle Relative Wall Thickness 0.49 cm    AV mean gradient 2 mmHg    AV valve area 4.10 cm2    AV Velocity Ratio 0.86     AV index (prosthetic) 0.87     E/A ratio 1.22     Mean e' 0.12 m/s    E wave deceleration time 140.93 msec    IVRT 114.18 msec    LVOT diameter 2.45 cm    LVOT area 4.7 cm2    LVOT peak jonh 0.81 m/s    LVOT peak VTI 20.20 cm    Ao peak jonh 0.94 m/s    Ao VTI 23.2 cm    RVOT peak jonh 0.42 m/s    RVOT peak VTI 11.6 cm    Mr max jonh 5.35 m/s    LVOT stroke volume 95.18 cm3    AV peak gradient 4 mmHg    PV mean gradient 0.45 mmHg    E/E' ratio 6.70 m/s    MV Peak E Jonh 0.77 m/s    TR Max Jonh 2.12 m/s    MV Peak A Jonh 0.63 m/s    LV Systolic Volume 39.87 mL    LV Systolic Volume Index 20.8 mL/m2    LV Diastolic Volume 94.79 mL    LV Diastolic Volume Index 49.37 mL/m2    LA Volume Index 28.8 mL/m2    LV Mass Index 93 g/m2    RA Major Axis 4.42 cm    Left Atrium Minor Axis 4.72 cm    Left Atrium Major Axis 6.23 cm    Triscuspid Valve Regurgitation Peak Gradient 18 mmHg    Right Atrial Pressure (from IVC) 3 mmHg    EF 60 %    TV rest pulmonary artery pressure 21 mmHg    Narrative    · The left ventricle is normal in size with concentric remodeling and   normal systolic function.  · Mild mitral regurgitation.  · Normal central venous pressure (3 mmHg).  · The estimated PA systolic pressure is 21 mmHg.  · Normal right ventricular size with normal right ventricular systolic   function.  · The estimated ejection fraction is 60%.  · Normal left ventricular diastolic function.      , EKG: reviewed, and X-Ray: CXR: X-Ray Chest 1 View (CXR): No results found for this visit on 04/04/23.

## 2023-04-05 NOTE — PROGRESS NOTES
Discharge education and instructions reviewed with patient. Questions answered as of now, LDA's and Telemetry monitor removed per provider order. Patient remained free from falls during shift. Discharge medications delivered to bedside per pharmacy. Transport requested, patient discharged with personal belongings.

## 2023-04-05 NOTE — HOSPITAL COURSE
4/5/23 pt seen and examined today, resting in bed. Denies any CP and CHF sxs at this time. S/P LHC with Dr. Lemus yesterday, right groin site C/D/I no bleeding noted. Discussed restrictions and medication compliance in detail with patient. Need OP nuclear stress. Labs reviewed, chart reviewed. Increased statin, continue brilinta, ASA, BB

## 2023-04-05 NOTE — PLAN OF CARE
[facility] - Tele[facility] (Hospital)  Discharge Final Note    Primary Care Provider: Tunde Feliz MD    Expected Discharge Date: 4/5/2023    Final Discharge Note (most recent)       Final Note - 04/05/23 1300          Final Note    Assessment Type Final Discharge Note     Anticipated Discharge Disposition Home or Self Care     Hospital Resources/Appts/Education Provided Appointments scheduled by Navigator/Coordinator;Appointments scheduled and added to AVS        Post-Acute Status    Discharge Delays None known at this time                     Important Message from Medicare             Contact Info       Janie Lemus MD   Specialty: Interventional Cardiology, Cardiology    [address]  [place] [address]   Phone: [phone]       Next Steps: Follow up

## 2023-04-05 NOTE — DISCHARGE SUMMARY
"[facility] - Telemetry ([facility])  Hospital Medicine  Discharge Summary      Patient Name: Solomon Joseph  MRN: [id number]  CLARISSE: [id number]  Patient Class: IP- Inpatient  Admission Date: 4/4/2023  Hospital Length of Stay: 0 days  Discharge Date and Time:  04/05/2023 12:08 PM  Attending Physician: Adilene Andrade DO   Discharging Provider: Adilene Andrade DO  Primary Care Provider: Primary Doctor No    Primary Care Team: Networked reference to record PCT     HPI:   The patient is a 58 yo male with hx CAD/STEMI s/p stent placement 2012 who presented to ED with chest pain -onset approx 2am. Pt states chest pain is described as a sharp pain to the left side of his chest that awoke him from sleep that lasted approx 2-3 seconds associated with chills. Pt denies dizziness, SOB, N/V or diaphoresis. After the episode, the pt was concerned. He checked his BP and noted the SBP was 180, so he came to ED for evaluation. The patient reports he work out with weights and cardio everyday. He noted he had a "cramp" to his left chest that started one week ago that he attributed to a work out injury.   Of note, the pt has not followed up with Cardiology. His home medications is only ASA.     In the ED, /89, EKG showed NSR-no ischemia. troponin is 0.141, CXR pending.     The patient will be placed in observation under hospital medicine       Procedure(s) (LRB):  Left heart cath (Left)  Percutaneous coronary intervention (N/A)  INSERTION, STENT, CORONARY ARTERY (N/A)  Instantaneous Wave-Free Ratio (IFR) (N/A)  IVUS, Coronary      Hospital Course:   Patient underwent left heart catheterization by Dr. Lemus on 04/04/2023, he was found to have two-vessel coronary artery disease and had 3 stents placed.  Started on dual antiplatelets therapy aspirin and Brilinta.  Post heart catheterization, patient felt well and back to baseline.  Cleared by Cardiology for discharge home.  Discharge plan discussed extensively with patient and wife on speaker " phone.  Patient also extensively counseled and emphasized the importance for the need for compliance with meds and medical followups as these are important despite his healthy lifestyle.  Patient admits that he does not have a PCP, agreeable to referral establish with a PCP.  All questions answered to his satisfaction.  As patient does not have any metoprolol or aspirin at home, these were also prescribed for him in addition to atorvastatin and Brilinta.  Prescription for atorvastatin, metoprolol, aspirin sent to [facility] pharmacy and brought to patient prior to leaving.     Goals of Care Treatment Preferences:  Code Status: Full Code      Consults:   Consults (From admission, onward)          Status Ordering Provider     Inpatient consult to Cardiology  Once        Provider:  (Not yet assigned)    Completed CRICKET MATHIAS            No new Assessment & Plan notes have been filed under this hospital service since the last note was generated.  Service: Hospital Medicine    Final Active Diagnoses:    Diagnosis Date Noted POA    PRINCIPAL PROBLEM:  Chest pain [R07.9] 04/04/2023 Yes    Elevated troponin [R77.8] 04/04/2023 Yes    Elevated blood pressure reading [R03.0] 04/04/2023 Yes    CAD (coronary artery disease) [I25.10] 04/04/2023 Yes    NSTEMI (non-ST elevated myocardial infarction) [I21.4] 04/04/2023 Yes      Problems Resolved During this Admission:       Discharged Condition: stable    Disposition: Home or Self Care    Follow Up:   Follow-up Information       Janie Lemus MD Follow up.    Specialties: Interventional Cardiology, Cardiology  Contact information:  [address]  [place] [address] [phone]                           Patient Instructions:      Ambulatory referral/consult to Family Practice   Standing Status: Future   Referral Priority: Routine Referral Type: Consultation   Referral Reason: Specialty Services Required   Requested Specialty: Family Medicine   Number of Visits  Requested: 1     Diet Cardiac     Notify your health care provider if you experience any of the following:  severe uncontrolled pain     Notify your health care provider if you experience any of the following:  difficulty breathing or increased cough     Notify your health care provider if you experience any of the following:  redness, tenderness, or signs of infection (pain, swelling, redness, odor or green/yellow discharge around incision site)     Activity as tolerated       Significant Diagnostic Studies: Labs:   CMP   Recent Labs   Lab 04/04/23 0329 04/04/23 0549 04/05/23  0502    138 139  139   K 4.0 3.8 4.3  4.1    106 107  107   CO2 23 22* 22*  22*   GLU 97 95 96  98   BUN 17 15 16  16   CREATININE 0.8 0.8 0.9  0.8   CALCIUM 8.6* 8.7 8.9  8.9   PROT 6.8 6.9 6.6   ALBUMIN 4.1 4.0 3.8   BILITOT 0.3 0.3 0.3   ALKPHOS 69 69 76   AST 23 21 20   ALT 25 25 25   ANIONGAP 11 10 10  10   , CBC   Recent Labs   Lab 04/04/23 0329 04/04/23 0549 04/05/23  0502   WBC 5.19 5.61 6.96   HGB 15.0 15.2 15.5   HCT 43.2 43.7 46.3    199 203   , Lipid Panel   Lab Results   Component Value Date    CHOL 121 05/14/2013    HDL 38 (L) 05/14/2013    LDLCALC 67.0 05/14/2013    TRIG 79 05/14/2013    CHOLHDL 31.4 05/14/2013   , Troponin   Recent Labs   Lab 04/04/23 0329 04/04/23  0549 04/04/23  1843   TROPONINI 0.141* 0.108* 0.099*    and All labs within the past 24 hours have been reviewed    Pending Diagnostic Studies:       Procedure Component Value Units Date/Time    Hemoglobin A1c [[id number]] Collected: 04/05/23 1110    Order Status: Sent Lab Status: In process Updated: 04/05/23 1110    Specimen: Blood     Lipid panel [[id number]] Collected: 04/05/23 0502    Order Status: Sent Lab Status: In process Updated: 04/05/23 0502    Specimen: Blood            Medications:  Reconciled Home Medications:      Medication List        START taking these medications      atorvastatin 80 MG tablet  Commonly known as:  LIPITOR  Take 1 tablet (80 mg total) by mouth once daily.  Start taking on: April 6, 2023  Replaces: simvastatin 40 MG tablet     ticagrelor 90 mg tablet  Commonly known as: BRILINTA  Take 1 tablet (90 mg total) by mouth 2 (two) times daily.            CONTINUE taking these medications      aspirin 81 MG Chew  Take 81 mg by mouth once daily.     metoprolol tartrate 25 MG tablet  Commonly known as: LOPRESSOR  Take 1 tablet (25 mg total) by mouth 2 (two) times daily.            STOP taking these medications      simvastatin 40 MG tablet  Commonly known as: ZOCOR  Replaced by: atorvastatin 80 MG tablet              Indwelling Lines/Drains at time of discharge:   Lines/Drains/Airways       None                   Time spent on the discharge of patient: 65 minutes         Adilene Andrade DO  Department of Hospital Medicine  [facility] - [facility] ([facility])

## 2023-04-05 NOTE — PROGRESS NOTES
"[facility] - Tele[facility] ([facility])  Cardiology  Progress Note    Patient Name: Solomon Joseph  MRN: [id number]  Admission Date: 4/4/2023  Hospital Length of Stay: 0 days  Code Status: Full Code   Attending Physician: Adilene Andrade DO   Primary Care Physician: Tunde Feliz MD  Expected Discharge Date: 4/5/2023  Principal Problem:Chest pain    Subjective:   HPI:   The patient is a 58 yo male with hx CAD/STEMI s/p stent placement 2012 who presented to ED with chest pain -onset approx 2am. Pt states chest pain is described as a sharp pain to the left side of his chest that awoke him from sleep that lasted approx 2-3 seconds associated with chills. Pt denies dizziness, SOB, N/V or diaphoresis. After the episode, the pt was concerned. He checked his BP and noted the SBP was 180, so he came to ED for evaluation. The patient reports he work out with weights and cardio everyday. He noted he had a "cramp" to his left chest that started one week ago that he attributed to a work out injury.   Of note, the pt has not followed up with Cardiology. His home medications is only ASA.      In the ED, /89, EKG showed NSR-no ischemia. troponin is 0.141, CXR pending.      58 yo male cardiology consult for NSTEMI/ACS  Pmh CAD s/p PCI DESx2 in p and mid LAD in 2012 by Dr. Lemus  Resting chest pain for 3 days. Good exercise,   Came in ER last night with troponin 0.148--> 0.11  Ekg NSR no acute stt change  Now chest pain free  On heparin tt   Hospital Course:   4/5/23 pt seen and examined today, resting in bed. Denies any CP and CHF sxs at this time. S/P LHC with Dr. Lemus yesterday, right groin site C/D/I no bleeding noted. Discussed restrictions and medication compliance in detail with patient. Need OP nuclear stress. Labs reviewed, chart reviewed. Increased statin, continue brilinta, ASA, BB          Review of Systems   Constitutional: Negative.   HENT: Negative.     Eyes: Negative.    Cardiovascular: Negative.    Respiratory: Negative.  "    Skin: Negative.    Musculoskeletal: Negative.    Gastrointestinal: Negative.    Genitourinary: Negative.    Neurological: Negative.    Psychiatric/Behavioral: Negative.     Objective:     Vital Signs (Most Recent):  Temp: 97.9 °F (36.6 °C) (04/05/23 1129)  Pulse: 65 (04/05/23 1129)  Resp: 18 (04/05/23 1129)  BP: 131/77 (04/05/23 1129)  SpO2: 96 % (04/05/23 1129) Vital Signs (24h Range):  Temp:  [97.5 °F (36.4 °C)-97.9 °F (36.6 °C)] 97.9 °F (36.6 °C)  Pulse:  [51-82] 65  Resp:  [15-18] 18  SpO2:  [96 %-100 %] 96 %  BP: (113-163)/(63-85) 131/77     Weight: 79.3 kg (174 lb 13.2 oz)  Body mass index is 27.38 kg/m².     SpO2: 96 %         Intake/Output Summary (Last 24 hours) at 4/5/2023 1224  Last data filed at 4/5/2023 0440  Gross per 24 hour   Intake 752.23 ml   Output --   Net 752.23 ml       Lines/Drains/Airways       None                   Physical Exam  Vitals and nursing note reviewed.   Constitutional:       Appearance: Normal appearance.   HENT:      Head: Normocephalic and atraumatic.   Eyes:      General:         Right eye: No discharge.         Left eye: No discharge.      Pupils: Pupils are equal, round, and reactive to light.   Cardiovascular:      Rate and Rhythm: Normal rate and regular rhythm.      Heart sounds: S1 normal and S2 normal. No murmur heard.    No friction rub.   Pulmonary:      Effort: Pulmonary effort is normal. No respiratory distress.      Breath sounds: Normal breath sounds. No rales.   Abdominal:      Palpations: Abdomen is soft.      Tenderness: There is no abdominal tenderness.   Musculoskeletal:      Cervical back: Neck supple.      Right lower leg: No edema.      Left lower leg: No edema.   Skin:     General: Skin is warm and dry.   Neurological:      General: No focal deficit present.      Mental Status: He is alert and oriented to person, place, and time.   Psychiatric:         Mood and Affect: Mood normal.         Behavior: Behavior normal.         Thought Content: Thought  content normal.       Significant Labs: BMP:   Recent Labs   Lab 04/04/23  0329 04/04/23  0549 04/05/23  0502   GLU 97 95 96  98    138 139  139   K 4.0 3.8 4.3  4.1    106 107  107   CO2 23 22* 22*  22*   BUN 17 15 16  16   CREATININE 0.8 0.8 0.9  0.8   CALCIUM 8.6* 8.7 8.9  8.9   MG  --  1.9 1.9   , CMP   Recent Labs   Lab 04/04/23  0329 04/04/23  0549 04/05/23  0502    138 139  139   K 4.0 3.8 4.3  4.1    106 107  107   CO2 23 22* 22*  22*   GLU 97 95 96  98   BUN 17 15 16  16   CREATININE 0.8 0.8 0.9  0.8   CALCIUM 8.6* 8.7 8.9  8.9   PROT 6.8 6.9 6.6   ALBUMIN 4.1 4.0 3.8   BILITOT 0.3 0.3 0.3   ALKPHOS 69 69 76   AST 23 21 20   ALT 25 25 25   ANIONGAP 11 10 10  10   , CBC   Recent Labs   Lab 04/04/23  0329 04/04/23  0549 04/05/23  0502   WBC 5.19 5.61 6.96   HGB 15.0 15.2 15.5   HCT 43.2 43.7 46.3    199 203   , INR   Recent Labs   Lab 04/04/23  0549   INR 1.0   , Lipid Panel No results for input(s): CHOL, HDL, LDLCALC, TRIG, CHOLHDL in the last 48 hours., Troponin   Recent Labs   Lab 04/04/23 0329 04/04/23  0549 04/04/23  1843   TROPONINI 0.141* 0.108* 0.099*   , and All pertinent lab results from the last 24 hours have been reviewed.    Significant Imaging: Cardiac Cath: reviewed, Echocardiogram: Transthoracic echo (TTE) complete (Cupid Only):   Results for orders placed or performed during the hospital encounter of 04/04/23   Echo   Result Value Ref Range    BSA 1.94 m2    TDI SEPTAL 0.10 m/s    LV LATERAL E/E' RATIO 5.92 m/s    LV SEPTAL E/E' RATIO 7.70 m/s    LA WIDTH 3.80 cm    IVC diameter 1.33 cm    Left Ventricular Outflow Tract Mean Velocity 0.56 cm/s    Left Ventricular Outflow Tract Mean Gradient 1.39 mmHg    TDI LATERAL 0.13 m/s    LVIDd 4.55 3.5 - 6.0 cm    IVS 1.09 0.6 - 1.1 cm    Posterior Wall 1.12 (A) 0.6 - 1.1 cm    Ao root annulus 3.34 cm    LVIDs 3.16 2.1 - 4.0 cm    FS 31 28 - 44 %    LA volume 55.34 cm3    STJ 3.40 cm    Ascending aorta  3.58 cm    LV mass 179.25 g    LA size 3.19 cm    TAPSE 2.00 cm    Left Ventricle Relative Wall Thickness 0.49 cm    AV mean gradient 2 mmHg    AV valve area 4.10 cm2    AV Velocity Ratio 0.86     AV index (prosthetic) 0.87     E/A ratio 1.22     Mean e' 0.12 m/s    E wave deceleration time 140.93 msec    IVRT 114.18 msec    LVOT diameter 2.45 cm    LVOT area 4.7 cm2    LVOT peak jonh 0.81 m/s    LVOT peak VTI 20.20 cm    Ao peak jonh 0.94 m/s    Ao VTI 23.2 cm    RVOT peak jonh 0.42 m/s    RVOT peak VTI 11.6 cm    Mr max jonh 5.35 m/s    LVOT stroke volume 95.18 cm3    AV peak gradient 4 mmHg    PV mean gradient 0.45 mmHg    E/E' ratio 6.70 m/s    MV Peak E Jonh 0.77 m/s    TR Max Jonh 2.12 m/s    MV Peak A Jonh 0.63 m/s    LV Systolic Volume 39.87 mL    LV Systolic Volume Index 20.8 mL/m2    LV Diastolic Volume 94.79 mL    LV Diastolic Volume Index 49.37 mL/m2    LA Volume Index 28.8 mL/m2    LV Mass Index 93 g/m2    RA Major Axis 4.42 cm    Left Atrium Minor Axis 4.72 cm    Left Atrium Major Axis 6.23 cm    Triscuspid Valve Regurgitation Peak Gradient 18 mmHg    Right Atrial Pressure (from IVC) 3 mmHg    EF 60 %    TV rest pulmonary artery pressure 21 mmHg    Narrative    · The left ventricle is normal in size with concentric remodeling and   normal systolic function.  · Mild mitral regurgitation.  · Normal central venous pressure (3 mmHg).  · The estimated PA systolic pressure is 21 mmHg.  · Normal right ventricular size with normal right ventricular systolic   function.  · The estimated ejection fraction is 60%.  · Normal left ventricular diastolic function.      , EKG: reviewed, and X-Ray: CXR: X-Ray Chest 1 View (CXR): No results found for this visit on 04/04/23.    Assessment and Plan:         * Chest pain  No chest pain noted on exam  S/P [facility] PCI x3    NSTEMI (non-ST elevated myocardial infarction)  NSTEMI/ACS  CAD s/p p and mid LAD DESx 2 in 2012    --continue ASA Heparin gtt and statin and metorpolol  --keep NPO  and IVF  --LHC today  --I have explained the risks, benefits, and alternatives of the procedure in detail with patient and family. The patient voices understanding and all questions have been addressed.  The patient agrees to proceed as planned.    4/5/23  S/p [facility] PCI x3 Dist Cx  ASA, Brilinta, Statin, BB  OP nuclear stress scheduled in May  Hosp follow up in 1 week        CAD (coronary artery disease)  S/P [facility] w/ PCI x3 in Dist Cx  Cont ASA, Brilinta, Statin, BB  Advised patient to stop using gym supplements, encouraged no added stress to heart    Elevated blood pressure reading  Stable  Medication compliance    Elevated troponin  S/p [facility] PCI x3 Dist Cx        VTE Risk Mitigation (From admission, onward)         Ordered     IP VTE LOW RISK PATIENT  Once         04/04/23 0508     Place sequential compression device  Until discontinued         04/04/23 0508                Bea Adames NP  Cardiology  [facility] - Tele[facility] ([facility])

## 2023-04-05 NOTE — ASSESSMENT & PLAN NOTE
NSTEMI/ACS  CAD s/p p and mid LAD DESx 2 in 2012    --continue ASA Heparin gtt and statin and metorpolol  --keep NPO and IVF  --LHC today  --I have explained the risks, benefits, and alternatives of the procedure in detail with patient and family. The patient voices understanding and all questions have been addressed.  The patient agrees to proceed as planned.    4/5/23  S/p [facility] PCI x3 Dist Cx  ASA, Brilinta, Statin, BB  OP nuclear stress scheduled in May  Hosp follow up in 1 week

## 2023-04-06 ENCOUNTER — PATIENT OUTREACH (OUTPATIENT)
Dept: ADMINISTRATIVE | Facility: HOSPITAL | Age: 58
End: 2023-04-06
Payer: COMMERCIAL

## 2023-04-06 NOTE — PROGRESS NOTES
Called patient to confirm hospital follow up scheduled on 4/10/2023.   Patient is not confirmed. LM to confirm appt.

## 2023-04-07 NOTE — HOSPITAL COURSE
Patient underwent left heart catheterization by Dr. Lemus on 04/04/2023, he was found to have two-vessel coronary artery disease and had 3 stents placed.  Started on dual antiplatelets therapy aspirin and Brilinta.  Post heart catheterization, patient felt well and back to baseline.  Cleared by Cardiology for discharge home.  Discharge plan discussed extensively with patient and wife on speaker phone.  Patient also extensively counseled and emphasized the importance for the need for compliance with meds and medical followups as these are important despite his healthy lifestyle.  Patient admits that he does not have a PCP, agreeable to referral establish with a PCP.  All questions answered to his satisfaction.  As patient does not have any metoprolol or aspirin at home, these were also prescribed for him in addition to atorvastatin and Brilinta.  Prescription for atorvastatin, metoprolol, aspirin sent to [facility] pharmacy and brought to patient prior to leaving.

## 2023-04-10 ENCOUNTER — HOSPITAL ENCOUNTER (OUTPATIENT)
Dept: CARDIOLOGY | Facility: HOSPITAL | Age: 58
Discharge: HOME | End: 2023-04-10
Payer: COMMERCIAL

## 2023-04-10 ENCOUNTER — OFFICE VISIT (OUTPATIENT)
Dept: CARDIOLOGY | Facility: CLINIC | Age: 58
End: 2023-04-10
Payer: COMMERCIAL

## 2023-04-10 ENCOUNTER — OFFICE VISIT (OUTPATIENT)
Dept: INTERNAL MEDICINE | Facility: CLINIC | Age: 58
End: 2023-04-10
Payer: COMMERCIAL

## 2023-04-10 VITALS
DIASTOLIC BLOOD PRESSURE: 70 MMHG | HEART RATE: 60 BPM | OXYGEN SATURATION: 97 % | WEIGHT: 171.31 LBS | BODY MASS INDEX: 26.89 KG/M2 | HEIGHT: 67 IN | SYSTOLIC BLOOD PRESSURE: 118 MMHG

## 2023-04-10 VITALS
SYSTOLIC BLOOD PRESSURE: 136 MMHG | OXYGEN SATURATION: 98 % | RESPIRATION RATE: 18 BRPM | BODY MASS INDEX: 26.91 KG/M2 | WEIGHT: 171.44 LBS | HEIGHT: 67 IN | HEART RATE: 62 BPM | DIASTOLIC BLOOD PRESSURE: 86 MMHG

## 2023-04-10 DIAGNOSIS — I48.0 PAROXYSMAL ATRIAL FIBRILLATION: ICD-10-CM

## 2023-04-10 DIAGNOSIS — I21.4 NSTEMI (NON-ST ELEVATED MYOCARDIAL INFARCTION): ICD-10-CM

## 2023-04-10 DIAGNOSIS — R00.2 PALPITATIONS: ICD-10-CM

## 2023-04-10 DIAGNOSIS — Z91.148 NONCOMPLIANCE WITH MEDICATION REGIMEN: ICD-10-CM

## 2023-04-10 DIAGNOSIS — Z12.5 SCREENING FOR PROSTATE CANCER: Primary | ICD-10-CM

## 2023-04-10 DIAGNOSIS — Z86.79 HISTORY OF ATRIAL FIBRILLATION: ICD-10-CM

## 2023-04-10 DIAGNOSIS — R03.0 ELEVATED BLOOD PRESSURE READING: ICD-10-CM

## 2023-04-10 DIAGNOSIS — R79.89 ELEVATED TROPONIN: ICD-10-CM

## 2023-04-10 DIAGNOSIS — I25.10 CORONARY ARTERY DISEASE, UNSPECIFIED VESSEL OR LESION TYPE, UNSPECIFIED WHETHER ANGINA PRESENT, UNSPECIFIED WHETHER NATIVE OR TRANSPLANTED HEART: ICD-10-CM

## 2023-04-10 DIAGNOSIS — I25.10 CORONARY ARTERY DISEASE INVOLVING NATIVE CORONARY ARTERY OF NATIVE HEART WITHOUT ANGINA PECTORIS: ICD-10-CM

## 2023-04-10 DIAGNOSIS — N40.0 BENIGN PROSTATIC HYPERPLASIA, UNSPECIFIED WHETHER LOWER URINARY TRACT SYMPTOMS PRESENT: ICD-10-CM

## 2023-04-10 DIAGNOSIS — R07.9 CHEST PAIN, UNSPECIFIED TYPE: ICD-10-CM

## 2023-04-10 DIAGNOSIS — I48.0 PAROXYSMAL ATRIAL FIBRILLATION: Primary | ICD-10-CM

## 2023-04-10 DIAGNOSIS — R07.9 CHEST PAIN, UNSPECIFIED TYPE: Primary | ICD-10-CM

## 2023-04-10 PROCEDURE — 3078F DIAST BP <80 MM HG: CPT | Mod: CPTII,S$GLB,,

## 2023-04-10 PROCEDURE — 99999 PR PBB SHADOW E&M-EST. PATIENT-LVL IV: ICD-10-PCS | Mod: PBBFAC,,, | Performed by: FAMILY MEDICINE

## 2023-04-10 PROCEDURE — 1159F MED LIST DOCD IN RCRD: CPT | Mod: CPTII,S$GLB,,

## 2023-04-10 PROCEDURE — 99214 OFFICE O/P EST MOD 30 MIN: CPT | Mod: S$GLB,,,

## 2023-04-10 PROCEDURE — 1111F PR DISCHARGE MEDS RECONCILED W/ CURRENT OUTPATIENT MED LIST: ICD-10-PCS | Mod: CPTII,S$GLB,,

## 2023-04-10 PROCEDURE — 3074F PR MOST RECENT SYSTOLIC BLOOD PRESSURE < 130 MM HG: ICD-10-PCS | Mod: CPTII,S$GLB,,

## 2023-04-10 PROCEDURE — 99999 PR PBB SHADOW E&M-EST. PATIENT-LVL IV: CPT | Mod: PBBFAC,,, | Performed by: FAMILY MEDICINE

## 2023-04-10 PROCEDURE — 99386 PREV VISIT NEW AGE 40-64: CPT | Mod: S$GLB,,, | Performed by: FAMILY MEDICINE

## 2023-04-10 PROCEDURE — 1159F PR MEDICATION LIST DOCUMENTED IN MEDICAL RECORD: ICD-10-PCS | Mod: CPTII,S$GLB,, | Performed by: FAMILY MEDICINE

## 2023-04-10 PROCEDURE — 3044F PR MOST RECENT HEMOGLOBIN A1C LEVEL <7.0%: ICD-10-PCS | Mod: CPTII,S$GLB,, | Performed by: FAMILY MEDICINE

## 2023-04-10 PROCEDURE — 3008F PR BODY MASS INDEX (BMI) DOCUMENTED: ICD-10-PCS | Mod: CPTII,S$GLB,, | Performed by: FAMILY MEDICINE

## 2023-04-10 PROCEDURE — 1111F DSCHRG MED/CURRENT MED MERGE: CPT | Mod: CPTII,S$GLB,,

## 2023-04-10 PROCEDURE — 3078F PR MOST RECENT DIASTOLIC BLOOD PRESSURE < 80 MM HG: ICD-10-PCS | Mod: CPTII,S$GLB,,

## 2023-04-10 PROCEDURE — 3079F PR MOST RECENT DIASTOLIC BLOOD PRESSURE 80-89 MM HG: ICD-10-PCS | Mod: CPTII,S$GLB,, | Performed by: FAMILY MEDICINE

## 2023-04-10 PROCEDURE — 1159F MED LIST DOCD IN RCRD: CPT | Mod: CPTII,S$GLB,, | Performed by: FAMILY MEDICINE

## 2023-04-10 PROCEDURE — 93010 ELECTROCARDIOGRAM REPORT: CPT | Mod: ,,, | Performed by: INTERNAL MEDICINE

## 2023-04-10 PROCEDURE — 99999 PR PBB SHADOW E&M-EST. PATIENT-LVL IV: CPT | Mod: PBBFAC,,,

## 2023-04-10 PROCEDURE — 1160F RVW MEDS BY RX/DR IN RCRD: CPT | Mod: CPTII,S$GLB,,

## 2023-04-10 PROCEDURE — 99386 PR PREVENTIVE VISIT,NEW,40-64: ICD-10-PCS | Mod: S$GLB,,, | Performed by: FAMILY MEDICINE

## 2023-04-10 PROCEDURE — 3008F BODY MASS INDEX DOCD: CPT | Mod: CPTII,S$GLB,,

## 2023-04-10 PROCEDURE — 1160F PR REVIEW ALL MEDS BY PRESCRIBER/CLIN PHARMACIST DOCUMENTED: ICD-10-PCS | Mod: CPTII,S$GLB,,

## 2023-04-10 PROCEDURE — 93005 ELECTROCARDIOGRAM TRACING: CPT

## 2023-04-10 PROCEDURE — 3044F HG A1C LEVEL LT 7.0%: CPT | Mod: CPTII,S$GLB,,

## 2023-04-10 PROCEDURE — 1159F PR MEDICATION LIST DOCUMENTED IN MEDICAL RECORD: ICD-10-PCS | Mod: CPTII,S$GLB,,

## 2023-04-10 PROCEDURE — 3044F HG A1C LEVEL LT 7.0%: CPT | Mod: CPTII,S$GLB,, | Performed by: FAMILY MEDICINE

## 2023-04-10 PROCEDURE — 3075F SYST BP GE 130 - 139MM HG: CPT | Mod: CPTII,S$GLB,, | Performed by: FAMILY MEDICINE

## 2023-04-10 PROCEDURE — 3074F SYST BP LT 130 MM HG: CPT | Mod: CPTII,S$GLB,,

## 2023-04-10 PROCEDURE — 99999 PR PBB SHADOW E&M-EST. PATIENT-LVL IV: ICD-10-PCS | Mod: PBBFAC,,,

## 2023-04-10 PROCEDURE — 99214 PR OFFICE/OUTPT VISIT, EST, LEVL IV, 30-39 MIN: ICD-10-PCS | Mod: S$GLB,,,

## 2023-04-10 PROCEDURE — 3044F PR MOST RECENT HEMOGLOBIN A1C LEVEL <7.0%: ICD-10-PCS | Mod: CPTII,S$GLB,,

## 2023-04-10 PROCEDURE — 3075F PR MOST RECENT SYSTOLIC BLOOD PRESS GE 130-139MM HG: ICD-10-PCS | Mod: CPTII,S$GLB,, | Performed by: FAMILY MEDICINE

## 2023-04-10 PROCEDURE — 3079F DIAST BP 80-89 MM HG: CPT | Mod: CPTII,S$GLB,, | Performed by: FAMILY MEDICINE

## 2023-04-10 PROCEDURE — 3008F BODY MASS INDEX DOCD: CPT | Mod: CPTII,S$GLB,, | Performed by: FAMILY MEDICINE

## 2023-04-10 PROCEDURE — 93010 EKG 12-LEAD: ICD-10-PCS | Mod: ,,, | Performed by: INTERNAL MEDICINE

## 2023-04-10 PROCEDURE — 3008F PR BODY MASS INDEX (BMI) DOCUMENTED: ICD-10-PCS | Mod: CPTII,S$GLB,,

## 2023-04-10 RX ORDER — METOPROLOL TARTRATE 25 MG/1
25 TABLET, FILM COATED ORAL 2 TIMES DAILY
Qty: 60 TABLET | Refills: 11 | Status: SHIPPED | OUTPATIENT
Start: 2023-04-10 | End: 2023-05-04 | Stop reason: SDUPTHER

## 2023-04-10 RX ORDER — ATORVASTATIN CALCIUM 80 MG/1
80 TABLET, FILM COATED ORAL DAILY
Qty: 30 TABLET | Refills: 6 | Status: SHIPPED | OUTPATIENT
Start: 2023-04-10 | End: 2023-05-04 | Stop reason: SDUPTHER

## 2023-04-10 RX ORDER — GUAIFENESIN/PHENYLPROPANOLAMIN
500 EXPECTORANT ORAL DAILY
COMMUNITY

## 2023-04-10 NOTE — PROGRESS NOTES
Subjective:       Patient ID: Solomon Joseph is a 57 y.o. male.    Chief Complaint: Chest Pain    Chest Pain       Patient Active Problem List   Diagnosis    Paroxysmal atrial fibrillation    Palpitations    Noncompliance with medication regimen    Chest pain    Elevated troponin    Elevated blood pressure reading    CAD (coronary artery disease)    NSTEMI (non-ST elevated myocardial infarction)       Past Medical History:   Diagnosis Date    Coronary artery disease     Hypertension        Past Surgical History:   Procedure Laterality Date    CORONARY STENT PLACEMENT      CORONARY STENT PLACEMENT N/A 4/4/2023    Procedure: INSERTION, STENT, CORONARY ARTERY;  Surgeon: Glen Lemus MD;  Location: [facility] CATH LAB;  Service: Cardiology;  Laterality: N/A;    INSTANTANEOUS WAVE-FREE RATIO (IFR) N/A 4/4/2023    Procedure: Instantaneous Wave-Free Ratio (IFR);  Surgeon: Glen Lemus MD;  Location: [facility] CATH LAB;  Service: Cardiology;  Laterality: N/A;    IVUS, CORONARY  4/4/2023    Procedure: IVUS, Coronary;  Surgeon: Glen Lemus MD;  Location: [facility] CATH LAB;  Service: Cardiology;;    LEFT HEART CATHETERIZATION Left 4/4/2023    Procedure: Left heart cath;  Surgeon: Glen Lemus MD;  Location: [facility] CATH LAB;  Service: Cardiology;  Laterality: Left;    PERCUTANEOUS CORONARY INTERVENTION, ARTERY N/A 4/4/2023    Procedure: Percutaneous coronary intervention;  Surgeon: Glen Lemus MD;  Location: [facility] CATH LAB;  Service: Cardiology;  Laterality: N/A;       Family History   Problem Relation Age of Onset    Heart disease Father        Social History     Tobacco Use   Smoking Status Former   Smokeless Tobacco Never       Wt Readings from Last 5 Encounters:   04/10/23 77.7 kg (171 lb 6.5 oz)   04/05/23 79.3 kg (174 lb 13.2 oz)   04/29/18 81.6 kg (179 lb 14.4 oz)   08/29/15 79.4 kg (175 lb)       For further HPI details, see assessment and plan.    Review of Systems   Cardiovascular:  Positive for chest pain.      Objective:      Vitals:    04/10/23 0914   BP: 136/86   Pulse: 62   Resp: 18       Physical Exam  Constitutional:       General: He is not in acute distress.     Appearance: Normal appearance. He is well-developed.   Neck:      Trachea: Trachea normal.   Cardiovascular:      Rate and Rhythm: Normal rate and regular rhythm.      Heart sounds: Normal heart sounds.   Pulmonary:      Effort: Pulmonary effort is normal. No respiratory distress.      Breath sounds: Normal breath sounds. No decreased breath sounds.   Abdominal:      Palpations: Abdomen is soft.   Musculoskeletal:      Cervical back: Full passive range of motion without pain.   Neurological:      Mental Status: He is alert and oriented to person, place, and time.   Psychiatric:         Speech: Speech normal.         Behavior: Behavior normal.         Thought Content: Thought content normal.       Assessment:       1. Screening for prostate cancer    2. Benign prostatic hyperplasia, unspecified whether lower urinary tract symptoms present    3. Coronary artery disease involving native coronary artery of native heart without angina pectoris    4. History of atrial fibrillation        Plan:   Screening for prostate cancer  -     PSA, Screening; Future; Expected date: 04/10/2023    Benign prostatic hyperplasia, unspecified whether lower urinary tract symptoms present  -     TESTOSTERONE; Future; Expected date: 04/10/2023    Coronary artery disease involving native coronary artery of native heart without angina pectoris    History of atrial fibrillation        Patient presents to [facility].      He was recently admitted.  He had a left heart catheterization with placement of 3 stents.    He was discharged home and is doing well with Lipitor, metoprolol, aspirin, and Brilinta.    He does have a history of coronary artery disease prior to this with a STEMI in 2012.  Up until recently he is only been taking aspirin.  Patient tells me he is followed by other  cardiologist in the past.  One of which stopped his medications as he was told he did not need them.        Thus far - tolerating medications - he is not happy about them - as concerned of side effects. Continue meds encouraged  Has cardiology consult going forward - has an apt at 11 today.  Encouraged he stays with a cardiologist for an established relationship.  Encouraged he also discuss his history of AShantel ruffin/ his cardiologist.    Together we reviewed the pathophysiology of coronary artery disease.  He is frustrated as he has no explanation for cause of his vascular disease.    He does not smoke, he does not drink excessively, he has maintained a strict physical activity lifestyle, he feels his diet is appropriate, he does not eat all that much red meat, his cholesterol has not been terrible.    Discussed as likely a genetic issue as his father had vascular issues as well.      He has a history of body building and weightlifting.  He denies any anabolic steroid use or testosterone replacement therapy use.  He has been taking testosterone supplements.  It is questionable to the degree in which these are effective and is further questionable on the potential impact these types of supplements can have on the vasculature.  I am advising discontinuation of them for at least 6 months.      Inquires about testosterone, I would recommend against the usage of testosterone replacement therapy for a minimum of 6 months given his recent stent placement.  He is curious about checking the testosterone level and that is reasonable especially as he will be off the supplements and we can recheck it again in 6 months.      We will check a prostate specific antigen.  I do want to get colon cancer screening records.      BPH  Nocturia has been an issue in the past.  Citing up to 3 times a night of urination.  He has had benefit with saw palmetto.  Reviewed the up-to-date resource recommendation on saw palmetto.  It is not formally  advisable.  It is questionable in regards to the benefit of saw palmetto on prostate issues.  I do not suspect but saw palmetto alone has a contraindication in regards to his coronary artery disease.  I did discuss that supplements are not FDA regulated and it is uncertain to the ingredients.  At present time he does not want to use any other pharmaceutical prescription agents.  We can consider Flomax in the future.        Discussed vaccines.  We will give him the shingles shot slip he can do at his convenience.  If not done by our next visit we can update them.      6 mo or prn    This note was verbally dictated, please excuse any type errors.

## 2023-04-10 NOTE — PROGRESS NOTES
Subjective:   Patient ID:  Solomon Joseph is a 57 y.o. male who presents for evaluation of No chief complaint on file.      HPI  Mr. Joseph is a 56 yo male with Pmhx of CAD/STEMI s/p stent placement 2012 DESx2 in p and mid LAD who presents for hospital follow up. Pt presented with chest pain that woke him up and had, EKG with no acute STT changes. Underwent LHC with PCI x3 in distal circ, recommended ASA, brilinta, statin, BB with repeat nuclear stress once heart recovers for possible further intervention in May 23'  Here today in clinic, denies any CP at this time. Pt reports concern for sleep apnea, wife states he does snore.    LHC 4/4/23 PCI x3 Dist Cx        Past Medical History:   Diagnosis Date    Coronary artery disease     Hypertension        Past Surgical History:   Procedure Laterality Date    CORONARY STENT PLACEMENT      CORONARY STENT PLACEMENT N/A 4/4/2023    Procedure: INSERTION, STENT, CORONARY ARTERY;  Surgeon: Glen Lemus MD;  Location: [facility] CATH LAB;  Service: Cardiology;  Laterality: N/A;    INSTANTANEOUS WAVE-FREE RATIO (IFR) N/A 4/4/2023    Procedure: Instantaneous Wave-Free Ratio (IFR);  Surgeon: Glen Lemus MD;  Location: [facility] CATH LAB;  Service: Cardiology;  Laterality: N/A;    IVUS, CORONARY  4/4/2023    Procedure: IVUS, Coronary;  Surgeon: Glen Lemus MD;  Location: [facility] CATH LAB;  Service: Cardiology;;    LEFT HEART CATHETERIZATION Left 4/4/2023    Procedure: Left heart cath;  Surgeon: Glen Lemus MD;  Location: [facility] CATH LAB;  Service: Cardiology;  Laterality: Left;    PERCUTANEOUS CORONARY INTERVENTION, ARTERY N/A 4/4/2023    Procedure: Percutaneous coronary intervention;  Surgeon: Glen Lemus MD;  Location: [facility] CATH LAB;  Service: Cardiology;  Laterality: N/A;       Social History     Tobacco Use    Smoking status: Former    Smokeless tobacco: Never   Substance Use Topics    Alcohol use: No    Drug use: No       Family History   Problem Relation Age of Onset     Heart disease Father        Current Outpatient Medications on File Prior to Visit   Medication Sig Dispense Refill    aspirin 81 MG Chew Take 81 mg by mouth once daily.      atorvastatin (LIPITOR) 80 MG tablet Take 1 tablet (80 mg total) by mouth once daily. 30 tablet 0    metoprolol tartrate (LOPRESSOR) 25 MG tablet Take 1 tablet (25 mg total) by mouth 2 (two) times daily. 60 tablet 0    ticagrelor (BRILINTA) 90 mg tablet Take 1 tablet (90 mg total) by mouth 2 (two) times daily. 60 tablet 0     No current facility-administered medications on file prior to visit.      Wt Readings from Last 3 Encounters:   04/05/23 79.3 kg (174 lb 13.2 oz)   04/29/18 81.6 kg (179 lb 14.4 oz)   08/29/15 79.4 kg (175 lb)     Temp Readings from Last 3 Encounters:   04/05/23 97.9 °F (36.6 °C) (Oral)   04/29/18 98 °F (36.7 °C)   08/29/15 97.6 °F (36.4 °C) (Oral)     BP Readings from Last 3 Encounters:   04/05/23 131/77   04/30/18 (!) 106/59   08/29/15 107/70     Pulse Readings from Last 3 Encounters:   04/05/23 65   04/30/18 70   08/29/15 71        Review of Systems   Constitutional: Negative.   HENT: Negative.     Eyes: Negative.    Respiratory:  Positive for snoring.    Skin: Negative.    Musculoskeletal: Negative.    Gastrointestinal: Negative.    Genitourinary: Negative.    Neurological: Negative.    Psychiatric/Behavioral: Negative.       Objective:   Physical Exam  Vitals and nursing note reviewed.   Constitutional:       Appearance: Normal appearance.   HENT:      Head: Normocephalic and atraumatic.   Eyes:      General:         Right eye: No discharge.         Left eye: No discharge.      Pupils: Pupils are equal, round, and reactive to light.   Cardiovascular:      Rate and Rhythm: Normal rate and regular rhythm.      Heart sounds: S1 normal and S2 normal. No murmur heard.    No friction rub.   Pulmonary:      Effort: Pulmonary effort is normal. No respiratory distress.      Breath sounds: Normal breath sounds. No rales.    Abdominal:      Palpations: Abdomen is soft.      Tenderness: There is no abdominal tenderness.   Musculoskeletal:      Cervical back: Neck supple.      Right lower leg: No edema.      Left lower leg: No edema.   Skin:     General: Skin is warm and dry.      Findings: Bruising present.      Comments: [facility] site C/D/I, healing well   Neurological:      General: No focal deficit present.      Mental Status: He is alert and oriented to person, place, and time.   Psychiatric:         Mood and Affect: Mood normal.         Behavior: Behavior normal.         Thought Content: Thought content normal.       Lab Results   Component Value Date    CHOL 175 04/05/2023    CHOL 121 05/14/2013    CHOL 155 05/27/2012     Lab Results   Component Value Date    HDL 38 (L) 04/05/2023    HDL 38 (L) 05/14/2013    HDL 40 05/27/2012     Lab Results   Component Value Date    LDLCALC 102.4 04/05/2023    LDLCALC 67.0 05/14/2013    LDLCALC 102.0 05/27/2012     Lab Results   Component Value Date    TRIG 173 (H) 04/05/2023    TRIG 79 05/14/2013    TRIG 66 05/27/2012     Lab Results   Component Value Date    CHOLHDL 21.7 04/05/2023    CHOLHDL 31.4 05/14/2013    CHOLHDL 25.8 05/27/2012       Chemistry        Component Value Date/Time     04/05/2023 0502     04/05/2023 0502    K 4.1 04/05/2023 0502    K 4.3 04/05/2023 0502     04/05/2023 0502     04/05/2023 0502    CO2 22 (L) 04/05/2023 0502    CO2 22 (L) 04/05/2023 0502    BUN 16 04/05/2023 0502    BUN 16 04/05/2023 0502    CREATININE 0.8 04/05/2023 0502    CREATININE 0.9 04/05/2023 0502    GLU 98 04/05/2023 0502    GLU 96 04/05/2023 0502        Component Value Date/Time    CALCIUM 8.9 04/05/2023 0502    CALCIUM 8.9 04/05/2023 0502    ALKPHOS 76 04/05/2023 0502    AST 20 04/05/2023 0502    ALT 25 04/05/2023 0502    BILITOT 0.3 04/05/2023 0502    ESTGFRAFRICA >60 04/29/2018 2231    EGFRNONAA >60 04/29/2018 2231          Lab Results   Component Value Date    TSH 1.543  04/04/2023     Lab Results   Component Value Date    INR 1.0 04/04/2023    INR 1.1 08/29/2015    INR 1.1 05/26/2012     @RESUFAST(WBC,HGB,HCT,MCV,PLT)  @LABRCNTIP(BNP,BNPTRIAGEBLO)@  CrCl cannot be calculated (Unknown ideal weight.).     Results for orders placed during the hospital encounter of 04/04/23    Echo    Interpretation Summary  · The left ventricle is normal in size with concentric remodeling and normal systolic function.  · Mild mitral regurgitation.  · Normal central venous pressure (3 mmHg).  · The estimated PA systolic pressure is 21 mmHg.  · Normal right ventricular size with normal right ventricular systolic function.  · The estimated ejection fraction is 60%.  · Normal left ventricular diastolic function.     No results found for this or any previous visit.     Assessment:      1. Coronary artery disease, unspecified vessel or lesion type, unspecified whether angina present, unspecified whether native or transplanted heart    2. Paroxysmal atrial fibrillation    3. Palpitations    4. Noncompliance with medication regimen        Plan:   Coronary artery disease, unspecified vessel or lesion type, unspecified whether angina present, unspecified whether native or transplanted heart    Paroxysmal atrial fibrillation    Palpitations    Noncompliance with medication regimen      ASA, statin, plavix, BB- CAD  BB, ASA- PAF    Nuclear stress ordered for May  Follow up with Dr. Lemus after nuclear stress is done to discuss possible further intervention    Home BP log  CHERISE referral      Courtney Guillot, FNP-C [facility], Cardiology

## 2023-04-12 ENCOUNTER — LAB VISIT (OUTPATIENT)
Dept: LAB | Facility: HOSPITAL | Age: 58
End: 2023-04-12
Attending: FAMILY MEDICINE
Payer: COMMERCIAL

## 2023-04-12 DIAGNOSIS — Z12.5 SCREENING FOR PROSTATE CANCER: ICD-10-CM

## 2023-04-12 DIAGNOSIS — N40.0 BENIGN PROSTATIC HYPERPLASIA, UNSPECIFIED WHETHER LOWER URINARY TRACT SYMPTOMS PRESENT: ICD-10-CM

## 2023-04-12 LAB
COMPLEXED PSA SERPL-MCNC: 1.1 NG/ML (ref 0–4)
TESTOST SERPL-MCNC: 675 NG/DL (ref 304–1227)

## 2023-04-12 PROCEDURE — 36415 COLL VENOUS BLD VENIPUNCTURE: CPT | Performed by: FAMILY MEDICINE

## 2023-04-12 PROCEDURE — 84153 ASSAY OF PSA TOTAL: CPT | Performed by: FAMILY MEDICINE

## 2023-04-12 PROCEDURE — 84403 ASSAY OF TOTAL TESTOSTERONE: CPT | Performed by: FAMILY MEDICINE

## 2023-04-13 ENCOUNTER — PATIENT OUTREACH (OUTPATIENT)
Dept: ADMINISTRATIVE | Facility: HOSPITAL | Age: 58
End: 2023-04-13
Payer: COMMERCIAL

## 2023-04-18 ENCOUNTER — PATIENT MESSAGE (OUTPATIENT)
Dept: ADMINISTRATIVE | Facility: HOSPITAL | Age: 58
End: 2023-04-18
Payer: COMMERCIAL

## 2023-05-04 DIAGNOSIS — I48.0 PAROXYSMAL ATRIAL FIBRILLATION: ICD-10-CM

## 2023-05-04 DIAGNOSIS — I25.10 CORONARY ARTERY DISEASE, UNSPECIFIED VESSEL OR LESION TYPE, UNSPECIFIED WHETHER ANGINA PRESENT, UNSPECIFIED WHETHER NATIVE OR TRANSPLANTED HEART: ICD-10-CM

## 2023-05-05 RX ORDER — ATORVASTATIN CALCIUM 80 MG/1
80 TABLET, FILM COATED ORAL DAILY
Qty: 30 TABLET | Refills: 11 | Status: SHIPPED | OUTPATIENT
Start: 2023-05-05 | End: 2023-08-29

## 2023-05-05 RX ORDER — METOPROLOL TARTRATE 25 MG/1
25 TABLET, FILM COATED ORAL 2 TIMES DAILY
Qty: 60 TABLET | Refills: 11 | Status: SHIPPED | OUTPATIENT
Start: 2023-05-05 | End: 2024-05-04

## 2023-05-08 ENCOUNTER — HOSPITAL ENCOUNTER (OUTPATIENT)
Dept: CARDIOLOGY | Facility: HOSPITAL | Age: 58
Discharge: HOME | End: 2023-05-08
Payer: COMMERCIAL

## 2023-05-08 ENCOUNTER — HOSPITAL ENCOUNTER (OUTPATIENT)
Dept: RADIOLOGY | Facility: HOSPITAL | Age: 58
Discharge: HOME | End: 2023-05-08
Payer: COMMERCIAL

## 2023-05-08 DIAGNOSIS — I25.10 CORONARY ARTERY DISEASE, UNSPECIFIED VESSEL OR LESION TYPE, UNSPECIFIED WHETHER ANGINA PRESENT, UNSPECIFIED WHETHER NATIVE OR TRANSPLANTED HEART: ICD-10-CM

## 2023-05-08 PROCEDURE — 93016 CV STRESS TEST SUPVJ ONLY: CPT | Mod: ,,, | Performed by: STUDENT IN AN ORGANIZED HEALTH CARE EDUCATION/TRAINING PROGRAM

## 2023-05-08 PROCEDURE — 78452 HT MUSCLE IMAGE SPECT MULT: CPT

## 2023-05-08 PROCEDURE — 78452 HT MUSCLE IMAGE SPECT MULT: CPT | Mod: 26,,, | Performed by: STUDENT IN AN ORGANIZED HEALTH CARE EDUCATION/TRAINING PROGRAM

## 2023-05-08 PROCEDURE — 78452 NUCLEAR STRESS - CARDIOLOGY INTERPRETED (CUPID ONLY): ICD-10-PCS | Mod: 26,,, | Performed by: STUDENT IN AN ORGANIZED HEALTH CARE EDUCATION/TRAINING PROGRAM

## 2023-05-08 PROCEDURE — 93018 CV STRESS TEST I&R ONLY: CPT | Mod: ,,, | Performed by: STUDENT IN AN ORGANIZED HEALTH CARE EDUCATION/TRAINING PROGRAM

## 2023-05-08 PROCEDURE — 93018 NUCLEAR STRESS - CARDIOLOGY INTERPRETED (CUPID ONLY): ICD-10-PCS | Mod: ,,, | Performed by: STUDENT IN AN ORGANIZED HEALTH CARE EDUCATION/TRAINING PROGRAM

## 2023-05-08 PROCEDURE — 93017 CV STRESS TEST TRACING ONLY: CPT

## 2023-05-08 PROCEDURE — 93016 NUCLEAR STRESS - CARDIOLOGY INTERPRETED (CUPID ONLY): ICD-10-PCS | Mod: ,,, | Performed by: STUDENT IN AN ORGANIZED HEALTH CARE EDUCATION/TRAINING PROGRAM

## 2023-05-08 RX ORDER — REGADENOSON 0.08 MG/ML
0.4 INJECTION, SOLUTION INTRAVENOUS ONCE
Status: SHIPPED | OUTPATIENT
Start: 2023-05-08

## 2023-05-15 ENCOUNTER — TELEPHONE (OUTPATIENT)
Dept: CARDIOLOGY | Facility: CLINIC | Age: 58
End: 2023-05-15
Payer: COMMERCIAL

## 2023-05-15 LAB
CV STRESS BASE HR: 52 BPM
DIASTOLIC BLOOD PRESSURE: 88 MMHG
NUC REST EJECTION FRACTION: 61
NUC STRESS EJECTION FRACTION: 67 %
OHS CV CPX 1 MINUTE RECOVERY HEART RATE: 116 BPM
OHS CV CPX 85 PERCENT MAX PREDICTED HEART RATE MALE: 140
OHS CV CPX ESTIMATED METS: 13
OHS CV CPX MAX PREDICTED HEART RATE: 165
OHS CV CPX PATIENT IS FEMALE: 0
OHS CV CPX PATIENT IS MALE: 1
OHS CV CPX PEAK DIASTOLIC BLOOD PRESSURE: 63 MMHG
OHS CV CPX PEAK HEAR RATE: 148 BPM
OHS CV CPX PEAK RATE PRESSURE PRODUCT: NORMAL
OHS CV CPX PEAK SYSTOLIC BLOOD PRESSURE: 178 MMHG
OHS CV CPX PERCENT MAX PREDICTED HEART RATE ACHIEVED: 90
OHS CV CPX RATE PRESSURE PRODUCT PRESENTING: 6604
STRESS ECHO POST EXERCISE DUR MIN: 11 MINUTES
STRESS ECHO POST EXERCISE DUR SEC: 15 SECONDS
STRESS ST DEPRESSION: 1 MM
SYSTOLIC BLOOD PRESSURE: 127 MMHG

## 2023-05-15 NOTE — TELEPHONE ENCOUNTER
.LVM for patient to call the office regarding results.      ----- Message from Glen Lemus MD sent at 5/15/2023  2:39 PM CDT -----  Stress test is normal.

## 2023-07-05 ENCOUNTER — OFFICE VISIT (OUTPATIENT)
Dept: CARDIOLOGY | Facility: CLINIC | Age: 58
End: 2023-07-05
Payer: COMMERCIAL

## 2023-07-05 VITALS
HEART RATE: 68 BPM | SYSTOLIC BLOOD PRESSURE: 116 MMHG | WEIGHT: 180.56 LBS | BODY MASS INDEX: 28.34 KG/M2 | DIASTOLIC BLOOD PRESSURE: 80 MMHG | OXYGEN SATURATION: 98 % | HEIGHT: 67 IN

## 2023-07-05 DIAGNOSIS — I25.2 HISTORY OF NON-ST ELEVATION MYOCARDIAL INFARCTION (NSTEMI): ICD-10-CM

## 2023-07-05 DIAGNOSIS — R03.0 ELEVATED BLOOD PRESSURE READING: ICD-10-CM

## 2023-07-05 DIAGNOSIS — I48.0 PAROXYSMAL ATRIAL FIBRILLATION: ICD-10-CM

## 2023-07-05 DIAGNOSIS — R06.83 SNORING: ICD-10-CM

## 2023-07-05 DIAGNOSIS — I25.118 CORONARY ARTERY DISEASE OF NATIVE ARTERY OF NATIVE HEART WITH STABLE ANGINA PECTORIS: ICD-10-CM

## 2023-07-05 DIAGNOSIS — R79.89 ELEVATED TROPONIN: Primary | ICD-10-CM

## 2023-07-05 DIAGNOSIS — R53.83 FATIGUE, UNSPECIFIED TYPE: ICD-10-CM

## 2023-07-05 PROCEDURE — 99999 PR PBB SHADOW E&M-EST. PATIENT-LVL V: CPT | Mod: PBBFAC,,, | Performed by: PHYSICIAN ASSISTANT

## 2023-07-05 PROCEDURE — 3079F PR MOST RECENT DIASTOLIC BLOOD PRESSURE 80-89 MM HG: ICD-10-PCS | Mod: CPTII,S$GLB,, | Performed by: PHYSICIAN ASSISTANT

## 2023-07-05 PROCEDURE — 1160F PR REVIEW ALL MEDS BY PRESCRIBER/CLIN PHARMACIST DOCUMENTED: ICD-10-PCS | Mod: CPTII,S$GLB,, | Performed by: PHYSICIAN ASSISTANT

## 2023-07-05 PROCEDURE — 1160F RVW MEDS BY RX/DR IN RCRD: CPT | Mod: CPTII,S$GLB,, | Performed by: PHYSICIAN ASSISTANT

## 2023-07-05 PROCEDURE — 3008F BODY MASS INDEX DOCD: CPT | Mod: CPTII,S$GLB,, | Performed by: PHYSICIAN ASSISTANT

## 2023-07-05 PROCEDURE — 3074F SYST BP LT 130 MM HG: CPT | Mod: CPTII,S$GLB,, | Performed by: PHYSICIAN ASSISTANT

## 2023-07-05 PROCEDURE — 3074F PR MOST RECENT SYSTOLIC BLOOD PRESSURE < 130 MM HG: ICD-10-PCS | Mod: CPTII,S$GLB,, | Performed by: PHYSICIAN ASSISTANT

## 2023-07-05 PROCEDURE — 3008F PR BODY MASS INDEX (BMI) DOCUMENTED: ICD-10-PCS | Mod: CPTII,S$GLB,, | Performed by: PHYSICIAN ASSISTANT

## 2023-07-05 PROCEDURE — 99214 PR OFFICE/OUTPT VISIT, EST, LEVL IV, 30-39 MIN: ICD-10-PCS | Mod: S$GLB,,, | Performed by: PHYSICIAN ASSISTANT

## 2023-07-05 PROCEDURE — 3079F DIAST BP 80-89 MM HG: CPT | Mod: CPTII,S$GLB,, | Performed by: PHYSICIAN ASSISTANT

## 2023-07-05 PROCEDURE — 1159F MED LIST DOCD IN RCRD: CPT | Mod: CPTII,S$GLB,, | Performed by: PHYSICIAN ASSISTANT

## 2023-07-05 PROCEDURE — 3044F PR MOST RECENT HEMOGLOBIN A1C LEVEL <7.0%: ICD-10-PCS | Mod: CPTII,S$GLB,, | Performed by: PHYSICIAN ASSISTANT

## 2023-07-05 PROCEDURE — 4010F ACE/ARB THERAPY RXD/TAKEN: CPT | Mod: CPTII,S$GLB,, | Performed by: PHYSICIAN ASSISTANT

## 2023-07-05 PROCEDURE — 4010F PR ACE/ARB THEARPY RXD/TAKEN: ICD-10-PCS | Mod: CPTII,S$GLB,, | Performed by: PHYSICIAN ASSISTANT

## 2023-07-05 PROCEDURE — 99999 PR PBB SHADOW E&M-EST. PATIENT-LVL V: ICD-10-PCS | Mod: PBBFAC,,, | Performed by: PHYSICIAN ASSISTANT

## 2023-07-05 PROCEDURE — 1159F PR MEDICATION LIST DOCUMENTED IN MEDICAL RECORD: ICD-10-PCS | Mod: CPTII,S$GLB,, | Performed by: PHYSICIAN ASSISTANT

## 2023-07-05 PROCEDURE — 99214 OFFICE O/P EST MOD 30 MIN: CPT | Mod: S$GLB,,, | Performed by: PHYSICIAN ASSISTANT

## 2023-07-05 PROCEDURE — 3044F HG A1C LEVEL LT 7.0%: CPT | Mod: CPTII,S$GLB,, | Performed by: PHYSICIAN ASSISTANT

## 2023-07-05 NOTE — PROGRESS NOTES
Subjective:   Patient ID:  Solomon Joseph is a 58 y.o. male who presents for follow-up of CAD/MPI stress test results    HPI  Mr. Joseph is a 58 year old male patient whose current medical conditions include CAD s/p prior STEMI with PCI of proximal and mid LAD in 2012, s/p recent NSTEMI 4/4/23 with successful PCI of LCX, one episode of PAF who presents today for follow-up. Patient returns today and states he is doing ok. Main issue is fatigue. States he feels like he has no get up and go. Exercises but has to push himself to do it. He also reports complains of constipation, states he had ED visit due to severity, concerned side effect from statin. He had some chest fullness related to constipation but resolved once GI symptoms improved. No katt heaviness or tightness. No SOB/JC. No lightheadedness, dizziness, palpitations, near syncope, or syncope. Exercises daily on treadmill, has to push himself to get started but feels better once he starts. BP ok today in clinic. Patient reports compliance with his medications, has been off Lisinopril for months.     MPI stress test negative for ischemia. Reviewed prior angiogram films with patient, at this juncture seems best to continue same meds/mgmgt.  Will re-discuss with Dr. Lemus upon his return.        Review of Systems   Constitutional: Positive for malaise/fatigue. Negative for chills, decreased appetite and fever.   HENT:  Negative for congestion, hoarse voice and sore throat.    Eyes:  Negative for blurred vision and discharge.   Cardiovascular:  Negative for chest pain, claudication, cyanosis, dyspnea on exertion, irregular heartbeat, leg swelling, near-syncope, orthopnea, palpitations and paroxysmal nocturnal dyspnea.   Respiratory:  Positive for snoring. Negative for cough, hemoptysis, shortness of breath, sputum production and wheezing.    Endocrine: Negative for cold intolerance and heat intolerance.   Hematologic/Lymphatic: Negative for bleeding problem. Does  "not bruise/bleed easily.   Skin:  Negative for rash.   Musculoskeletal:  Negative for arthritis, back pain, joint pain, joint swelling, muscle cramps, muscle weakness and myalgias.   Gastrointestinal:  Positive for constipation. Negative for abdominal pain, diarrhea, heartburn, melena and nausea.   Genitourinary:  Negative for hematuria.   Neurological:  Negative for dizziness, focal weakness, headaches, light-headedness, loss of balance, numbness, paresthesias, seizures and weakness.   Psychiatric/Behavioral:  Negative for memory loss. The patient does not have insomnia.    Allergic/Immunologic: Negative for hives.     /80 (BP Location: Left arm, Patient Position: Sitting, BP Method: Large (Manual))   Pulse 68   Ht 5' 7" (1.702 m)   Wt 81.9 kg (180 lb 8.9 oz)   SpO2 98%   BMI 28.28 kg/m²     Objective:   Physical Exam  Vitals and nursing note reviewed.   Constitutional:       General: He is not in acute distress.     Appearance: Normal appearance. He is well-developed. He is not diaphoretic.   HENT:      Head: Normocephalic and atraumatic.   Eyes:      General:         Right eye: No discharge.         Left eye: No discharge.      Pupils: Pupils are equal, round, and reactive to light.   Neck:      Thyroid: No thyromegaly.      Vascular: No JVD.      Trachea: No tracheal deviation.   Cardiovascular:      Rate and Rhythm: Normal rate and regular rhythm.      Heart sounds: Normal heart sounds, S1 normal and S2 normal. No murmur heard.  Pulmonary:      Effort: Pulmonary effort is normal. No respiratory distress.      Breath sounds: Normal breath sounds. No wheezing or rales.   Abdominal:      General: There is no distension.      Palpations: Abdomen is soft.      Tenderness: There is no rebound.   Musculoskeletal:      Cervical back: Neck supple.      Right lower leg: No edema.      Left lower leg: No edema.   Skin:     General: Skin is warm and dry.      Findings: No erythema.   Neurological:      General: " No focal deficit present.      Mental Status: He is alert and oriented to person, place, and time.   Psychiatric:         Mood and Affect: Mood normal.         Behavior: Behavior normal.         Thought Content: Thought content normal.     MPI Stress Test results    Normal myocardial perfusion scan. There is no evidence of myocardial ischemia or infarction.    The gated perfusion images showed an ejection fraction of 61% at rest. The gated perfusion images showed an ejection fraction of 67% post stress.    The ECG portion of the study is suspicious for ischemia.    The patient reported no chest pain during the stress test.    There were no arrhythmias during stress.    The patient exercised for 11 minutes 15 seconds on a Tyler protocol, corresponding to a functional capacity of 13 METS, achieving a peak heart rate of 148 bpm, which is 90 % of the age predicted maximum heart rate.    Echo Results    Summary    The left ventricle is normal in size with concentric remodeling and normal systolic function.  Mild mitral regurgitation.  Normal central venous pressure (3 mmHg).  The estimated PA systolic pressure is 21 mmHg.  Normal right ventricular size with normal right ventricular systolic function.  The estimated ejection fraction is 60%.  Normal left ventricular diastolic function.  Assessment:      1. Elevated troponin    2. Elevated blood pressure reading    3. Coronary artery disease of native artery of native heart with stable angina pectoris    4. Paroxysmal atrial fibrillation    5. History of non-ST elevation myocardial infarction (NSTEMI)    6. Snoring    7. Fatigue, unspecified type      Patient presents for f/u. Doing clinically well, having issues with constipation, will cut atorvastatin to half (40 mg nightly) and see if that improves his symptoms. Also complains of fatigue, will refer to pulmonary for CHERISE workup to make sure that is not a contributing factor. Continue other CV meds, he is not taking  Lisinopril  (has been off for past 3 months will d/c from med list). Reviewed/discussed prior angiogram and stress test results.   Plan:   -Decrease atorvastatin to 40 mg nightly  -Continue other CV meds, discussed importance of compliance with DAPT  -Cardiac low salt diet  -Pulmonary referral for CHERISE workup  -Nutrition referral per patient request  -RTC with Dr. Lemus in 8 weeks

## 2023-07-07 ENCOUNTER — HOSPITAL ENCOUNTER (OUTPATIENT)
Dept: RADIOLOGY | Facility: HOSPITAL | Age: 58
Discharge: HOME | End: 2023-07-07
Attending: INTERNAL MEDICINE
Payer: COMMERCIAL

## 2023-07-07 ENCOUNTER — OFFICE VISIT (OUTPATIENT)
Dept: PULMONOLOGY | Facility: CLINIC | Age: 58
End: 2023-07-07
Payer: COMMERCIAL

## 2023-07-07 VITALS
HEIGHT: 67 IN | RESPIRATION RATE: 18 BRPM | SYSTOLIC BLOOD PRESSURE: 98 MMHG | HEART RATE: 58 BPM | OXYGEN SATURATION: 95 % | WEIGHT: 179.69 LBS | BODY MASS INDEX: 28.2 KG/M2 | DIASTOLIC BLOOD PRESSURE: 62 MMHG

## 2023-07-07 DIAGNOSIS — G47.33 OSA (OBSTRUCTIVE SLEEP APNEA): Primary | ICD-10-CM

## 2023-07-07 DIAGNOSIS — I48.0 PAROXYSMAL ATRIAL FIBRILLATION: ICD-10-CM

## 2023-07-07 DIAGNOSIS — I25.118 CORONARY ARTERY DISEASE OF NATIVE ARTERY OF NATIVE HEART WITH STABLE ANGINA PECTORIS: ICD-10-CM

## 2023-07-07 DIAGNOSIS — R53.83 FATIGUE, UNSPECIFIED TYPE: ICD-10-CM

## 2023-07-07 DIAGNOSIS — R06.83 SNORING: ICD-10-CM

## 2023-07-07 PROCEDURE — 71046 XR CHEST PA AND LATERAL: ICD-10-PCS | Mod: 26,,, | Performed by: RADIOLOGY

## 2023-07-07 PROCEDURE — 1160F PR REVIEW ALL MEDS BY PRESCRIBER/CLIN PHARMACIST DOCUMENTED: ICD-10-PCS | Mod: CPTII,S$GLB,, | Performed by: INTERNAL MEDICINE

## 2023-07-07 PROCEDURE — 3044F HG A1C LEVEL LT 7.0%: CPT | Mod: CPTII,S$GLB,, | Performed by: INTERNAL MEDICINE

## 2023-07-07 PROCEDURE — 1160F RVW MEDS BY RX/DR IN RCRD: CPT | Mod: CPTII,S$GLB,, | Performed by: INTERNAL MEDICINE

## 2023-07-07 PROCEDURE — 3074F SYST BP LT 130 MM HG: CPT | Mod: CPTII,S$GLB,, | Performed by: INTERNAL MEDICINE

## 2023-07-07 PROCEDURE — 71046 X-RAY EXAM CHEST 2 VIEWS: CPT | Mod: TC

## 2023-07-07 PROCEDURE — 3074F PR MOST RECENT SYSTOLIC BLOOD PRESSURE < 130 MM HG: ICD-10-PCS | Mod: CPTII,S$GLB,, | Performed by: INTERNAL MEDICINE

## 2023-07-07 PROCEDURE — 3008F PR BODY MASS INDEX (BMI) DOCUMENTED: ICD-10-PCS | Mod: CPTII,S$GLB,, | Performed by: INTERNAL MEDICINE

## 2023-07-07 PROCEDURE — 4010F PR ACE/ARB THEARPY RXD/TAKEN: ICD-10-PCS | Mod: CPTII,S$GLB,, | Performed by: INTERNAL MEDICINE

## 2023-07-07 PROCEDURE — 1159F MED LIST DOCD IN RCRD: CPT | Mod: CPTII,S$GLB,, | Performed by: INTERNAL MEDICINE

## 2023-07-07 PROCEDURE — 3044F PR MOST RECENT HEMOGLOBIN A1C LEVEL <7.0%: ICD-10-PCS | Mod: CPTII,S$GLB,, | Performed by: INTERNAL MEDICINE

## 2023-07-07 PROCEDURE — 3078F PR MOST RECENT DIASTOLIC BLOOD PRESSURE < 80 MM HG: ICD-10-PCS | Mod: CPTII,S$GLB,, | Performed by: INTERNAL MEDICINE

## 2023-07-07 PROCEDURE — 4010F ACE/ARB THERAPY RXD/TAKEN: CPT | Mod: CPTII,S$GLB,, | Performed by: INTERNAL MEDICINE

## 2023-07-07 PROCEDURE — 1159F PR MEDICATION LIST DOCUMENTED IN MEDICAL RECORD: ICD-10-PCS | Mod: CPTII,S$GLB,, | Performed by: INTERNAL MEDICINE

## 2023-07-07 PROCEDURE — 99204 OFFICE O/P NEW MOD 45 MIN: CPT | Mod: S$GLB,,, | Performed by: INTERNAL MEDICINE

## 2023-07-07 PROCEDURE — 99999 PR PBB SHADOW E&M-EST. PATIENT-LVL IV: CPT | Mod: PBBFAC,,, | Performed by: INTERNAL MEDICINE

## 2023-07-07 PROCEDURE — 3008F BODY MASS INDEX DOCD: CPT | Mod: CPTII,S$GLB,, | Performed by: INTERNAL MEDICINE

## 2023-07-07 PROCEDURE — 71046 X-RAY EXAM CHEST 2 VIEWS: CPT | Mod: 26,,, | Performed by: RADIOLOGY

## 2023-07-07 PROCEDURE — 99204 PR OFFICE/OUTPT VISIT, NEW, LEVL IV, 45-59 MIN: ICD-10-PCS | Mod: S$GLB,,, | Performed by: INTERNAL MEDICINE

## 2023-07-07 PROCEDURE — 3078F DIAST BP <80 MM HG: CPT | Mod: CPTII,S$GLB,, | Performed by: INTERNAL MEDICINE

## 2023-07-07 PROCEDURE — 99999 PR PBB SHADOW E&M-EST. PATIENT-LVL IV: ICD-10-PCS | Mod: PBBFAC,,, | Performed by: INTERNAL MEDICINE

## 2023-07-07 NOTE — PROGRESS NOTES
Pulmonary Outpatient  Visit     Subjective:       Patient ID: Solomon Joseph is a 58 y.o. male.    Social History     Tobacco Use   Smoking Status Never   Smokeless Tobacco Never            Chief Complaint: Sleep Apnea      Solomon Joseph is 58 y.o.  Referred by Ora Qureshi PA-C  [address] [facility] [facility] [place] [address]   Colonoscopy done 6 years ago and provider suggested needed CHERISE evaluation  AD s/p prior STEMI with PCI of proximal and mid LAD in 2012, s/p recent NSTEMI 4/4/23 with successful PCI of LCX, one episode of PAF  Elevated BP  Sinus issues  Worse on bask  Recent PCI x2  Apnea and gasping for air  Bed time: 11 pm  Wake time: 6 am   Occupation: Business owner      SDI  Bed time 11 pm, wake time 6 am  SOL 30 mins  No restless legs, No sleep attacks  No signs of narcolepsy  Vivid dreams  No sleep walking  No violent movement in sleep  Deliberately sleep less to get things done          Review of Systems   Constitutional:  Positive for fatigue.   Respiratory:  Positive for apnea and snoring.    Psychiatric/Behavioral:  Positive for sleep disturbance.    All other systems reviewed and are negative.    Outpatient Encounter Medications as of 7/7/2023   Medication Sig Dispense Refill    aspirin 81 MG Chew Take 81 mg by mouth once daily.      atorvastatin (LIPITOR) 80 MG tablet Take 1 tablet (80 mg total) by mouth once daily. 30 tablet 11    metoprolol tartrate (LOPRESSOR) 25 MG tablet Take 1 tablet (25 mg total) by mouth 2 (two) times daily. 60 tablet 11    saw palmetto 500 MG capsule Take 500 mg by mouth once daily.      ticagrelor (BRILINTA) 90 mg tablet Take 1 tablet (90 mg total) by mouth 2 (two) times daily. 60 tablet 11     Facility-Administered Encounter Medications as of 7/7/2023   Medication Dose Route Frequency Provider Last Rate Last Admin    regadenoson injection 0.4 mg  0.4 mg Intravenous Once Bea Adames NP           The following  portions of the patient's history were reviewed and updated as appropriate: He  has a past medical history of Coronary artery disease and Hypertension.  He does not have any pertinent problems on file.  He  has a past surgical history that includes Coronary stent placement; Left heart catheterization (Left, 4/4/2023); percutaneous coronary intervention, artery (N/A, 4/4/2023); Coronary stent placement (N/A, 4/4/2023); instantaneous wave-free ratio (ifr) (N/A, 4/4/2023); and ivus, coronary (4/4/2023).  His family history includes Heart disease in his father.  He  reports that he has never smoked. He has never used smokeless tobacco. He reports that he does not drink alcohol and does not use drugs.  He has a current medication list which includes the following prescription(s): aspirin, atorvastatin, metoprolol tartrate, saw palmetto, and ticagrelor, and the following Facility-Administered Medications: regadenoson.  Current Outpatient Medications on File Prior to Visit   Medication Sig Dispense Refill    aspirin 81 MG Chew Take 81 mg by mouth once daily.      atorvastatin (LIPITOR) 80 MG tablet Take 1 tablet (80 mg total) by mouth once daily. 30 tablet 11    metoprolol tartrate (LOPRESSOR) 25 MG tablet Take 1 tablet (25 mg total) by mouth 2 (two) times daily. 60 tablet 11    saw palmetto 500 MG capsule Take 500 mg by mouth once daily.      ticagrelor (BRILINTA) 90 mg tablet Take 1 tablet (90 mg total) by mouth 2 (two) times daily. 60 tablet 11     Current Facility-Administered Medications on File Prior to Visit   Medication Dose Route Frequency Provider Last Rate Last Admin    regadenoson injection 0.4 mg  0.4 mg Intravenous Once Bea Adames NP         He has No Known Allergies..      BP Readings from Last 3 Encounters:   07/07/23 98/62   07/05/23 116/80   04/10/23 118/70     Snoring / Sleep:     [place] Questionnaire (validated CHERISE screening questionnaire)    YES -- Snoring/apnea    YES -- Fatigue    Body mass  "index is 28.14 kg/m².  (>25 is overweight, >30 is obese)    Blood Pressure = Hypertension  (PreHTN 120-139/80-89, Stg1 140-159/90-99, Stg2 >160/>100)  [place] = 3 of three CHERISE categories are positive (high risk is 2-3 positive categories)     [place] Sleepiness Scale TOTAL =      [place] SLEEPINESS SCALE 7/7/2023   Sitting and reading 1   Watching TV 1   Sitting, inactive in a public place (e.g. a theatre or a meeting) 0   As a passenger in a car for an hour without a break 0   Lying down to rest in the afternoon when circumstances permit 3   Sitting and talking to someone 0   Sitting quietly after a lunch without alcohol 0   In a car, while stopped for a few minutes in traffic 0   Total score 5      (validated sleepiness questionnaire with a higher score indicating greater sleepiness; range 0-24)  No flowsheet data found.    STOP-Bang Questionnaire (validated CHERISE screening questionnaire)  Negative unless checked off.  [x] Snoring    [x]  Tired/Fatigued/Sleepy  [] Obstruction (apneas/choking)  [x] Pressure (HTN)  [] BMI >35  [x] Age >50  [] Neck >40 cm  [x] Gender male   STOP-Bang = 5 (low risk 0-2,high risk 3-8)    Neck circumference 15"        MMRC Dyspnea Scale (4 is worst)     [x] MMRC 0: Dyspneic on strenuous excercise (0 points)    [] MMRC 1: Dyspneic on walking a slight hill (0 points)    [] MMRC 2: Dyspneic on walking level ground; must stop occasionally due to breathlessness (1 point)    [] MMRC 3: Must stop for breathlessness after walking 100 yards or after a few minutes (2 points)    [] MMRC 4: Cannot leave house; breathless on dressing/undressing (3 points)                     No flowsheet data found.              Objective:     Vital Signs (Most Recent)  Vital Signs  Pulse: (!) 58  Resp: 18  SpO2: 95 %  BP: 98/62  Height and Weight  Height: 5' 7" (170.2 cm)  Weight: 81.5 kg (179 lb 10.8 oz)  BSA (Calculated - sq m): 1.96 sq meters  BMI (Calculated): 28.1  Weight in (lb) to have BMI = 25: 159.3]  Wt " "Readings from Last 2 Encounters:   07/07/23 81.5 kg (179 lb 10.8 oz)   07/05/23 81.9 kg (180 lb 8.9 oz)       Physical Exam  Vitals and nursing note reviewed.   Constitutional:       Appearance: Normal appearance.   HENT:      Head: Normocephalic and atraumatic.      Right Ear: Tympanic membrane normal.      Nose: Nose normal.      Mouth/Throat:      Comments: Malampati score 4  Neck 15"  Mild retrognatia  Eyes:      Pupils: Pupils are equal, round, and reactive to light.   Cardiovascular:      Rate and Rhythm: Normal rate and regular rhythm.      Pulses: Normal pulses.      Heart sounds: Normal heart sounds.   Pulmonary:      Effort: Pulmonary effort is normal.      Breath sounds: Normal breath sounds.   Abdominal:      General: Bowel sounds are normal.      Palpations: Abdomen is soft.   Musculoskeletal:         General: Normal range of motion.      Cervical back: Normal range of motion.   Skin:     General: Skin is warm and dry.      Capillary Refill: Capillary refill takes less than 2 seconds.   Neurological:      General: No focal deficit present.      Mental Status: He is alert and oriented to person, place, and time.   Psychiatric:         Mood and Affect: Mood normal.        Laboratory  Lab Results   Component Value Date    WBC 6.96 04/05/2023    RBC 5.32 04/05/2023    HGB 15.5 04/05/2023    HCT 46.3 04/05/2023    MCV 87 04/05/2023    MCH 29.1 04/05/2023    MCHC 33.5 04/05/2023    RDW 12.1 04/05/2023     04/05/2023    MPV 11.3 04/05/2023    GRAN 4.2 04/05/2023    GRAN 60.8 04/05/2023    LYMPH 1.9 04/05/2023    LYMPH 26.9 04/05/2023    MONO 0.6 04/05/2023    MONO 8.0 04/05/2023    EOS 0.2 04/05/2023    BASO 0.04 04/05/2023    EOSINOPHIL 3.3 04/05/2023    BASOPHIL 0.6 04/05/2023       BMP  Lab Results   Component Value Date     04/05/2023     04/05/2023    K 4.1 04/05/2023    K 4.3 04/05/2023     04/05/2023     04/05/2023    CO2 22 (L) 04/05/2023    CO2 22 (L) 04/05/2023    BUN " 16 04/05/2023    BUN 16 04/05/2023    CREATININE 0.8 04/05/2023    CREATININE 0.9 04/05/2023    CALCIUM 8.9 04/05/2023    CALCIUM 8.9 04/05/2023    ANIONGAP 10 04/05/2023    ANIONGAP 10 04/05/2023    ESTGFRAFRICA >60 04/29/2018    EGFRNONAA >60 04/29/2018    AST 20 04/05/2023    ALT 25 04/05/2023    PROT 6.6 04/05/2023          No results found for: IGE     No results found for: ASPERGILLUS  No results found for: AFUMIGATUSCL     No results found for: ACE     Diagnostic Results:  I have personally reviewed today the following studies:    X-Ray Chest PA And Lateral  Narrative: EXAM:  XR CHEST PA AND LATERAL    CLINICAL HISTORY: Fatigue    COMPARISON: 04/04/2023    FINDINGS: This examination consists of a two-view x-ray of the chest.  The size and contour of the heart are normal.  There is no focal pulmonary infiltrate visualized.  There is no pneumothorax or pleural effusion.  Impression:  There is no focal pulmonary infiltrate visualized.    Finalized on: 7/7/2023 10:13 AM By:  Skyler Cantu MD  [facility]# [id number]      2023-07-07 10:15:[id number]    [facility]       Assessment/Plan:     Problem List Items Addressed This Visit       Paroxysmal atrial fibrillation    Snoring    Fatigue    Relevant Orders    X-Ray Chest PA And Lateral (Completed)    CAD (coronary artery disease)     ASA, STATIN, BB         CHERISE (obstructive sleep apnea) - Primary     Snoring, apnea  Comorbidity HLD, CAD    Home study ordered          Relevant Orders    Home Sleep Study        My recommendation at this point would be to set up a HOME SLEEP TEST or INLAB POLYSOMNOGRAPHY  through the [facility] ( [phone].    We have discussed weight loss , non supine position, good sleep hygiene, and  other interventions to foster good natural healthy sleep.  We have discussed behavioral modifications, as well.  After her study, she  could certainly try PAP therapy or out suitable alternatives      Follow up in about 4 weeks (around 8/4/2023), or CXR,  Home sleep.    This note was prepared using voice recognition system and is likely to have sound alike errors that may have been overlooked even after proof reading.  Please call me with any questions    Discussed diagnosis, its evaluation, treatment and usual course. All questions answered.    Thank you for the courtesy of participating in the care of this patient    Darren Dubon MD      Personal Diagnostic Review  [x]  CXR    []  ECHO    []  ONSAT    []  6MWD    []  LABS    []  CHEST CT    []  PET CT    []  Biopsy results

## 2023-07-07 NOTE — PATIENT INSTRUCTIONS
Your provider has scheduled you for a sleep study.   You should be receiving a phone call from the sleep lab shortly after your study has been approved by your insurance. Please make sure you have your current phone numbers in the [facility] system. If you do not hear from anyone in the next 10 business days, please call the sleep lab at [phone] to schedule your sleep study. The sleep studies are performed at [facility] seven nights a week.  When you are scheduling your sleep study, they will also make you a follow up appointment with your provider. This follow up appointment will be 10-14 days after your sleep study to review the results. If it is noted that you do have sleep apnea on your initial sleep study, you may receive a call back for a second night study with the CPAP before you come back to the office.

## 2023-07-18 ENCOUNTER — TELEPHONE (OUTPATIENT)
Dept: CARDIOLOGY | Facility: HOSPITAL | Age: 58
End: 2023-07-18
Payer: COMMERCIAL

## 2023-07-18 NOTE — TELEPHONE ENCOUNTER
Please phone patient. Discussed with Dr. Lemus. No additional procedures/intervention warranted at present time.    Keep scheduled f/u.    Thanks

## 2023-07-18 NOTE — TELEPHONE ENCOUNTER
Attempted to contact pt and inform him Discussed with Dr. Lemus. No additional procedures/intervention warranted at present time. Keep scheduled f/u.  No answer, LVM      ---CHEIKH Mendez 07/18/23  Please phone patient. Discussed with Dr. Lemus. No additional procedures/intervention warranted at present time.    Keep scheduled f/u.    Thanks

## 2023-08-07 ENCOUNTER — HOSPITAL ENCOUNTER (OUTPATIENT)
Dept: SLEEP MEDICINE | Facility: HOSPITAL | Age: 58
Discharge: HOME | End: 2023-08-07
Attending: INTERNAL MEDICINE
Payer: COMMERCIAL

## 2023-08-07 DIAGNOSIS — G47.33 OSA (OBSTRUCTIVE SLEEP APNEA): ICD-10-CM

## 2023-08-07 PROCEDURE — 95806 SLEEP STUDY UNATT&RESP EFFT: CPT | Performed by: INTERNAL MEDICINE

## 2023-08-07 NOTE — Clinical Note
Single study Duration : 5 hours and 6 minutes Overall AHI 5.5/hr with 28 events Spo2 darling 89% Snoring was 45% Upper Airway resistance syndrome Consider repeat teating

## 2023-08-09 DIAGNOSIS — G47.33 OSA (OBSTRUCTIVE SLEEP APNEA): Primary | ICD-10-CM

## 2023-08-09 PROCEDURE — 95806 PR SLEEP STUDY, UNATTENDED, SIMUL RECORD HR/O2 SAT/RESP FLOW/RESP EFFT: ICD-10-PCS | Mod: 26,52,, | Performed by: INTERNAL MEDICINE

## 2023-08-09 PROCEDURE — 95806 SLEEP STUDY UNATT&RESP EFFT: CPT | Mod: 26,52,, | Performed by: INTERNAL MEDICINE

## 2023-08-09 NOTE — PROCEDURES
"Single study  Duration : 5 hours and 6 minutes  Overall AHI 5.5/hr with 28 events  Spo2 darling 89%  Snoring was 45%  Upper Airway resistance syndrome  Consider repeat testing      Dear Darren Dubon MD  [id number] [facility]  [place] [address]/Tunde Feliz MD         The sleep study that you ordered is complete.  You have ordered sleep LAB services to perform the sleep study for Solomon Joseph .      Please find Sleep Study result in  the "Media tab" of Chart Review menu.        You can look  for the report in the  Media by the document type "Sleep Study Documents". Alphabetizing  "Document type" column helps to find the SLEEP STUDY report  Faster.       As the ordering provider, you are responsible for reviewing the results and implementing a treatment plan with your patient.    If you need a Sleep Medicine provider to explain the sleep study findings and arrange treatment for the patient, please refer patient for consultation to our Sleep Clinic via [facility] with Ambulatory Consult Sleep.     To do that please place an order for an  "Ambulatory Consult Sleep" -  order , it will go to our clinic work queue for our staff  to contact the patient for an appointment.      For any questions, please contact our sleep lab  staff at [phone] to talk to clinical staff          Darren Dubon MD   "

## 2023-08-09 NOTE — PROGRESS NOTES
Orders Placed This Encounter   Procedures    Home Sleep Study     Single study  Duration : 5 hours and 6 minutes  Overall AHI 5.5/hr with 28 events  Spo2 darling 89%  Snoring was 45%  Upper Airway resistance syndrome  Consider repeat teating     Standing Status:   Future     Standing Expiration Date:   8/8/2024     Scheduling Instructions:      3 nights

## 2023-08-29 ENCOUNTER — OFFICE VISIT (OUTPATIENT)
Dept: CARDIOLOGY | Facility: CLINIC | Age: 58
End: 2023-08-29
Payer: COMMERCIAL

## 2023-08-29 VITALS
OXYGEN SATURATION: 97 % | SYSTOLIC BLOOD PRESSURE: 118 MMHG | WEIGHT: 178.56 LBS | HEART RATE: 70 BPM | DIASTOLIC BLOOD PRESSURE: 80 MMHG | BODY MASS INDEX: 28.02 KG/M2 | HEIGHT: 67 IN

## 2023-08-29 DIAGNOSIS — I48.0 PAROXYSMAL ATRIAL FIBRILLATION: ICD-10-CM

## 2023-08-29 DIAGNOSIS — G47.33 OSA (OBSTRUCTIVE SLEEP APNEA): ICD-10-CM

## 2023-08-29 DIAGNOSIS — R00.2 PALPITATIONS: ICD-10-CM

## 2023-08-29 DIAGNOSIS — I25.118 CORONARY ARTERY DISEASE OF NATIVE ARTERY OF NATIVE HEART WITH STABLE ANGINA PECTORIS: Primary | ICD-10-CM

## 2023-08-29 DIAGNOSIS — I25.10 CORONARY ARTERY DISEASE, UNSPECIFIED VESSEL OR LESION TYPE, UNSPECIFIED WHETHER ANGINA PRESENT, UNSPECIFIED WHETHER NATIVE OR TRANSPLANTED HEART: ICD-10-CM

## 2023-08-29 DIAGNOSIS — I25.2 HISTORY OF NON-ST ELEVATION MYOCARDIAL INFARCTION (NSTEMI): ICD-10-CM

## 2023-08-29 PROCEDURE — 3044F PR MOST RECENT HEMOGLOBIN A1C LEVEL <7.0%: ICD-10-PCS | Mod: CPTII,S$GLB,,

## 2023-08-29 PROCEDURE — 4010F PR ACE/ARB THEARPY RXD/TAKEN: ICD-10-PCS | Mod: CPTII,S$GLB,,

## 2023-08-29 PROCEDURE — 1160F PR REVIEW ALL MEDS BY PRESCRIBER/CLIN PHARMACIST DOCUMENTED: ICD-10-PCS | Mod: CPTII,S$GLB,,

## 2023-08-29 PROCEDURE — 1159F MED LIST DOCD IN RCRD: CPT | Mod: CPTII,S$GLB,,

## 2023-08-29 PROCEDURE — 4010F ACE/ARB THERAPY RXD/TAKEN: CPT | Mod: CPTII,S$GLB,,

## 2023-08-29 PROCEDURE — 1160F RVW MEDS BY RX/DR IN RCRD: CPT | Mod: CPTII,S$GLB,,

## 2023-08-29 PROCEDURE — 3079F PR MOST RECENT DIASTOLIC BLOOD PRESSURE 80-89 MM HG: ICD-10-PCS | Mod: CPTII,S$GLB,,

## 2023-08-29 PROCEDURE — 99214 PR OFFICE/OUTPT VISIT, EST, LEVL IV, 30-39 MIN: ICD-10-PCS | Mod: S$GLB,,,

## 2023-08-29 PROCEDURE — 3008F PR BODY MASS INDEX (BMI) DOCUMENTED: ICD-10-PCS | Mod: CPTII,S$GLB,,

## 2023-08-29 PROCEDURE — 3074F PR MOST RECENT SYSTOLIC BLOOD PRESSURE < 130 MM HG: ICD-10-PCS | Mod: CPTII,S$GLB,,

## 2023-08-29 PROCEDURE — 99999 PR PBB SHADOW E&M-EST. PATIENT-LVL III: ICD-10-PCS | Mod: PBBFAC,,,

## 2023-08-29 PROCEDURE — 3008F BODY MASS INDEX DOCD: CPT | Mod: CPTII,S$GLB,,

## 2023-08-29 PROCEDURE — 1159F PR MEDICATION LIST DOCUMENTED IN MEDICAL RECORD: ICD-10-PCS | Mod: CPTII,S$GLB,,

## 2023-08-29 PROCEDURE — 99999 PR PBB SHADOW E&M-EST. PATIENT-LVL III: CPT | Mod: PBBFAC,,,

## 2023-08-29 PROCEDURE — 99214 OFFICE O/P EST MOD 30 MIN: CPT | Mod: S$GLB,,,

## 2023-08-29 PROCEDURE — 3044F HG A1C LEVEL LT 7.0%: CPT | Mod: CPTII,S$GLB,,

## 2023-08-29 PROCEDURE — 3079F DIAST BP 80-89 MM HG: CPT | Mod: CPTII,S$GLB,,

## 2023-08-29 PROCEDURE — 3074F SYST BP LT 130 MM HG: CPT | Mod: CPTII,S$GLB,,

## 2023-08-29 RX ORDER — ATORVASTATIN CALCIUM 80 MG/1
40 TABLET, FILM COATED ORAL DAILY
Qty: 15 TABLET | Refills: 11 | Status: SHIPPED | OUTPATIENT
Start: 2023-08-29 | End: 2024-08-28

## 2023-08-29 NOTE — PROGRESS NOTES
Subjective:   Patient ID:  Solomon Joseph is a 58 y.o. male who presents for evaluation of No chief complaint on file.      Travis Joseph is a 58 year old male patient whose current medical conditions include CAD s/p prior STEMI with PCI of proximal and mid LAD in 2012, s/p recent NSTEMI 4/4/23 with successful PCI of LCX, one episode of PAF who presents today for follow-up. Would like to follow with Dr. Lemus in clinic. Nuclear stress  repeated in May, no further intervention recommended. Doing better since decreasing his lipitor. Undergoing testing for sleep apnea. CV wise doing well, compliant with medication    Past Medical History:   Diagnosis Date    Coronary artery disease     Hypertension        Past Surgical History:   Procedure Laterality Date    CORONARY STENT PLACEMENT      CORONARY STENT PLACEMENT N/A 4/4/2023    Procedure: INSERTION, STENT, CORONARY ARTERY;  Surgeon: Glen Lemus MD;  Location: [facility] CATH LAB;  Service: Cardiology;  Laterality: N/A;    INSTANTANEOUS WAVE-FREE RATIO (IFR) N/A 4/4/2023    Procedure: Instantaneous Wave-Free Ratio (IFR);  Surgeon: Glen Lemus MD;  Location: [facility] CATH LAB;  Service: Cardiology;  Laterality: N/A;    IVUS, CORONARY  4/4/2023    Procedure: IVUS, Coronary;  Surgeon: Glen Lemus MD;  Location: [facility] CATH LAB;  Service: Cardiology;;    LEFT HEART CATHETERIZATION Left 4/4/2023    Procedure: Left heart cath;  Surgeon: Glen Lemus MD;  Location: [facility] CATH LAB;  Service: Cardiology;  Laterality: Left;    PERCUTANEOUS CORONARY INTERVENTION, ARTERY N/A 4/4/2023    Procedure: Percutaneous coronary intervention;  Surgeon: Glen Lemus MD;  Location: [facility] CATH LAB;  Service: Cardiology;  Laterality: N/A;       Social History     Tobacco Use    Smoking status: Never    Smokeless tobacco: Never   Substance Use Topics    Alcohol use: No    Drug use: No       Family History   Problem Relation Age of Onset    Heart disease Father        Current Outpatient  Medications on File Prior to Visit   Medication Sig Dispense Refill    aspirin 81 MG Chew Take 81 mg by mouth once daily.      atorvastatin (LIPITOR) 80 MG tablet Take 1 tablet (80 mg total) by mouth once daily. 30 tablet 11    metoprolol tartrate (LOPRESSOR) 25 MG tablet Take 1 tablet (25 mg total) by mouth 2 (two) times daily. 60 tablet 11    saw palmetto 500 MG capsule Take 500 mg by mouth once daily.      ticagrelor (BRILINTA) 90 mg tablet Take 1 tablet (90 mg total) by mouth 2 (two) times daily. 60 tablet 11     Current Facility-Administered Medications on File Prior to Visit   Medication Dose Route Frequency Provider Last Rate Last Admin    regadenoson injection 0.4 mg  0.4 mg Intravenous Once Bea Adames, KATHERINE          Wt Readings from Last 3 Encounters:   07/07/23 81.5 kg (179 lb 10.8 oz)   07/05/23 81.9 kg (180 lb 8.9 oz)   04/10/23 77.7 kg (171 lb 4.8 oz)     Temp Readings from Last 3 Encounters:   04/05/23 97.9 °F (36.6 °C) (Oral)   04/29/18 98 °F (36.7 °C)   08/29/15 97.6 °F (36.4 °C) (Oral)     BP Readings from Last 3 Encounters:   07/07/23 98/62   07/05/23 116/80   04/10/23 118/70     Pulse Readings from Last 3 Encounters:   07/07/23 (!) 58   07/05/23 68   04/10/23 60        Review of Systems   Constitutional: Negative.   HENT: Negative.     Eyes: Negative.    Cardiovascular: Negative.    Respiratory: Negative.     Skin: Negative.    Musculoskeletal: Negative.    Gastrointestinal: Negative.    Genitourinary: Negative.    Neurological: Negative.    Psychiatric/Behavioral: Negative.         Objective:   Physical Exam  Vitals and nursing note reviewed.   Constitutional:       Appearance: Normal appearance.   HENT:      Head: Normocephalic and atraumatic.   Eyes:      General:         Right eye: No discharge.         Left eye: No discharge.      Pupils: Pupils are equal, round, and reactive to light.   Cardiovascular:      Rate and Rhythm: Normal rate and regular rhythm.      Heart sounds: S1 normal  and S2 normal. No murmur heard.     No friction rub.   Pulmonary:      Effort: Pulmonary effort is normal. No respiratory distress.      Breath sounds: Normal breath sounds. No rales.   Abdominal:      Palpations: Abdomen is soft.      Tenderness: There is no abdominal tenderness.   Musculoskeletal:      Cervical back: Neck supple.      Right lower leg: No edema.      Left lower leg: No edema.   Skin:     General: Skin is warm and dry.   Neurological:      General: No focal deficit present.      Mental Status: He is alert and oriented to person, place, and time.   Psychiatric:         Mood and Affect: Mood normal.         Behavior: Behavior normal.         Thought Content: Thought content normal.         Lab Results   Component Value Date    CHOL 175 04/05/2023    CHOL 121 05/14/2013    CHOL 155 05/27/2012     Lab Results   Component Value Date    HDL 38 (L) 04/05/2023    HDL 38 (L) 05/14/2013    HDL 40 05/27/2012     Lab Results   Component Value Date    LDLCALC 102.4 04/05/2023    LDLCALC 67.0 05/14/2013    LDLCALC 102.0 05/27/2012     Lab Results   Component Value Date    TRIG 173 (H) 04/05/2023    TRIG 79 05/14/2013    TRIG 66 05/27/2012     Lab Results   Component Value Date    CHOLHDL 21.7 04/05/2023    CHOLHDL 31.4 05/14/2013    CHOLHDL 25.8 05/27/2012       Chemistry        Component Value Date/Time     04/05/2023 0502     04/05/2023 0502    K 4.1 04/05/2023 0502    K 4.3 04/05/2023 0502     04/05/2023 0502     04/05/2023 0502    CO2 22 (L) 04/05/2023 0502    CO2 22 (L) 04/05/2023 0502    BUN 16 04/05/2023 0502    BUN 16 04/05/2023 0502    CREATININE 0.8 04/05/2023 0502    CREATININE 0.9 04/05/2023 0502    GLU 98 04/05/2023 0502    GLU 96 04/05/2023 0502        Component Value Date/Time    CALCIUM 8.9 04/05/2023 0502    CALCIUM 8.9 04/05/2023 0502    ALKPHOS 76 04/05/2023 0502    AST 20 04/05/2023 0502    ALT 25 04/05/2023 0502    BILITOT 0.3 04/05/2023 0502    ESTGFRAFRICA >60  04/29/2018 2231    EGFRNONAA >60 04/29/2018 2231          Lab Results   Component Value Date    TSH 1.543 04/04/2023     Lab Results   Component Value Date    INR 1.0 04/04/2023    INR 1.1 08/29/2015    INR 1.1 05/26/2012     @RESUFAST(WBC,HGB,HCT,MCV,PLT)  @LABRCNTIP(BNP,BNPTRIAGEBLO)@  CrCl cannot be calculated (Patient's most recent lab result is older than the maximum 7 days allowed.).     Results for orders placed during the hospital encounter of 04/04/23    Echo    Interpretation Summary  · The left ventricle is normal in size with concentric remodeling and normal systolic function.  · Mild mitral regurgitation.  · Normal central venous pressure (3 mmHg).  · The estimated PA systolic pressure is 21 mmHg.  · Normal right ventricular size with normal right ventricular systolic function.  · The estimated ejection fraction is 60%.  · Normal left ventricular diastolic function.     Results for orders placed during the hospital encounter of 05/08/23    Nuclear Stress - Cardiology Interpreted    Interpretation Summary    Normal myocardial perfusion scan. There is no evidence of myocardial ischemia or infarction.    The gated perfusion images showed an ejection fraction of 61% at rest. The gated perfusion images showed an ejection fraction of 67% post stress.    The ECG portion of the study is suspicious for ischemia.    The patient reported no chest pain during the stress test.    There were no arrhythmias during stress.    The patient exercised for 11 minutes 15 seconds on a [place] protocol, corresponding to a functional capacity of 13 METS, achieving a peak heart rate of 148 bpm, which is 90 % of the age predicted maximum heart rate.     Assessment:      1. Paroxysmal atrial fibrillation    2. Palpitations    3. Coronary artery disease of native artery of native heart with stable angina pectoris    4. History of non-ST elevation myocardial infarction (NSTEMI)    5. CHERISE (obstructive sleep apnea)        Plan:    Paroxysmal atrial fibrillation    Palpitations    Coronary artery disease of native artery of native heart with stable angina pectoris    History of non-ST elevation myocardial infarction (NSTEMI)    CHERISE (obstructive sleep apnea)      Cont current CV medications  RF modification, low Na, Low fat diet  Daily exercise as tolerated    RTC with MD  Labs in 3 mos    Bea Adames, FNP-C [facility], Cardiology

## 2023-09-18 ENCOUNTER — HOSPITAL ENCOUNTER (OUTPATIENT)
Dept: SLEEP MEDICINE | Facility: HOSPITAL | Age: 58
Discharge: HOME | End: 2023-09-18
Attending: INTERNAL MEDICINE
Payer: COMMERCIAL

## 2023-09-18 DIAGNOSIS — G47.33 OSA (OBSTRUCTIVE SLEEP APNEA): ICD-10-CM

## 2023-09-18 PROCEDURE — 95800 PR SLEEP STUDY, UNATTENDED, RECORD HEART RATE/O2 SAT/RESP ANAL/SLEEP TIME: ICD-10-PCS | Mod: 26,,, | Performed by: INTERNAL MEDICINE

## 2023-09-18 PROCEDURE — 95806 SLEEP STUDY UNATT&RESP EFFT: CPT | Performed by: INTERNAL MEDICINE

## 2023-09-18 PROCEDURE — 95800 SLP STDY UNATTENDED: CPT | Mod: 26,,, | Performed by: INTERNAL MEDICINE

## 2023-09-18 NOTE — Clinical Note
PHYSICIAN INTERPRETATION AND COMMENTS: Findings are consistent with mild, positional obstructive sleep apnea(CHERISE). Therapy with CPAP indicated CLINICAL HISTORY: 58 year old male presented with: 18 inch neck, BMI of 26.8, an [place] sleepiness score of 11, history of heart disease and symptoms of nocturnal snoring, waking up choking and witnessed apneas. Based on the clinical history, the patient has a high pre-test probability of having Severe CHERISE.

## 2023-09-19 NOTE — PROCEDURES
PHYSICIAN INTERPRETATION AND COMMENTS: Findings are consistent with mild, positional obstructive sleep apnea(CHERISE).  Therapy with CPAP indicated  CLINICAL HISTORY: 58 year old male presented with: 18 inch neck, BMI of 26.8, an [place] sleepiness score of 11, history of  heart disease and symptoms of nocturnal snoring, waking up choking and witnessed apneas. Based on the clinical  history, the patient has a high pre-test probability of having Severe CHERISE.  SLEEP STUDY FINDINGS: Patient underwent a 2 night Home Sleep Test and by behavioral criteria, slept for approximately  13.56 hours , with a sleep efficiency of 97.5%. The patient slept supine 8.8% of the night based on valid recording time of  13.44 hours and is 3.3 times as likely to have apneas/hypopneas when supine. When considering more subtle measures of  sleep disordered breathing, the overall respiratory disturbance index is mild(RDI=13) based on a 1% hypopnea desaturation  criteria with confirmation by surrogate arousal indicators. The apneas/hypopneas are accompanied by minimal oxygen  desaturation (percent time below 90% SpO2: 0%, Min SpO2: 88%). The average desaturation across all sleep disordered  breathing events is 2.4%. Snoring occurs for 10.2% (30 dB) of the study, 5.1% is very loud. The mean pulse rate is 53.4 BPM.  TREATMENT CONSIDERATIONS: Consider an attended CPAP titration study, given the reported Heart disease. For a patient  with mild asymptomatic CHERISE, nasal continuous positive airway pressure (CPAP/AutoPAP) therapy or alternative therapies  for treatment should be considered, i.e., Mandibular advancement splint (MAS), referral to an ENT surgeon for  modification to the airway, and/or weight loss or behavioral therapy to reduce the potential risk of daytime somnolence,  hypertension, cardiovascular disease, stroke and diabetes. Based on the CHERISE Supine data in the study, a Mandibular  Advancement Splint (MAS) will likely provide treatment  "benefit independent of CHERISE severity. The patient should avoid  sleeping supine; the non-supine AHI is 3.3 times less severe than the supine AHI.      Dear Darren Dubon MD  [id number] [address] [facility]  [place] [address]/Tunde Feliz MD         The sleep study that you ordered is complete.  You have ordered sleep LAB services to perform the sleep study for Solomon Joseph .      Please find Sleep Study result in  the "Media tab" of Chart Review menu.        You can look  for the report in the  Media by the document type "Sleep Study Documents". Alphabetizing  "Document type" column helps to find the SLEEP STUDY report  Faster.       As the ordering provider, you are responsible for reviewing the results and implementing a treatment plan with your patient.    If you need a Sleep Medicine provider to explain the sleep study findings and arrange treatment for the patient, please refer patient for consultation to our Sleep Clinic via [facility] with Ambulatory Consult Sleep.     To do that please place an order for an  "Ambulatory Consult Sleep" -  order , it will go to our clinic work queue for our staff  to contact the patient for an appointment.      For any questions, please contact our sleep lab  staff at [phone] to talk to clinical staff          Darren Dubon MD   "

## 2023-10-19 ENCOUNTER — OFFICE VISIT (OUTPATIENT)
Dept: PULMONOLOGY | Facility: CLINIC | Age: 58
End: 2023-10-19
Payer: COMMERCIAL

## 2023-10-19 VITALS
HEIGHT: 67 IN | OXYGEN SATURATION: 95 % | RESPIRATION RATE: 17 BRPM | SYSTOLIC BLOOD PRESSURE: 138 MMHG | BODY MASS INDEX: 28.18 KG/M2 | DIASTOLIC BLOOD PRESSURE: 74 MMHG | HEART RATE: 76 BPM | WEIGHT: 179.56 LBS

## 2023-10-19 DIAGNOSIS — I48.0 PAROXYSMAL ATRIAL FIBRILLATION: ICD-10-CM

## 2023-10-19 DIAGNOSIS — G47.33 OSA (OBSTRUCTIVE SLEEP APNEA): Primary | ICD-10-CM

## 2023-10-19 DIAGNOSIS — I25.2 HISTORY OF NON-ST ELEVATION MYOCARDIAL INFARCTION (NSTEMI): ICD-10-CM

## 2023-10-19 PROCEDURE — 4010F PR ACE/ARB THEARPY RXD/TAKEN: ICD-10-PCS | Mod: CPTII,S$GLB,, | Performed by: PHYSICIAN ASSISTANT

## 2023-10-19 PROCEDURE — 99203 OFFICE O/P NEW LOW 30 MIN: CPT | Mod: S$GLB,,, | Performed by: PHYSICIAN ASSISTANT

## 2023-10-19 PROCEDURE — 99999 PR PBB SHADOW E&M-EST. PATIENT-LVL III: ICD-10-PCS | Mod: PBBFAC,,, | Performed by: PHYSICIAN ASSISTANT

## 2023-10-19 PROCEDURE — 3078F DIAST BP <80 MM HG: CPT | Mod: CPTII,S$GLB,, | Performed by: PHYSICIAN ASSISTANT

## 2023-10-19 PROCEDURE — 4010F ACE/ARB THERAPY RXD/TAKEN: CPT | Mod: CPTII,S$GLB,, | Performed by: PHYSICIAN ASSISTANT

## 2023-10-19 PROCEDURE — 3008F BODY MASS INDEX DOCD: CPT | Mod: CPTII,S$GLB,, | Performed by: PHYSICIAN ASSISTANT

## 2023-10-19 PROCEDURE — 3044F PR MOST RECENT HEMOGLOBIN A1C LEVEL <7.0%: ICD-10-PCS | Mod: CPTII,S$GLB,, | Performed by: PHYSICIAN ASSISTANT

## 2023-10-19 PROCEDURE — 3078F PR MOST RECENT DIASTOLIC BLOOD PRESSURE < 80 MM HG: ICD-10-PCS | Mod: CPTII,S$GLB,, | Performed by: PHYSICIAN ASSISTANT

## 2023-10-19 PROCEDURE — 3008F PR BODY MASS INDEX (BMI) DOCUMENTED: ICD-10-PCS | Mod: CPTII,S$GLB,, | Performed by: PHYSICIAN ASSISTANT

## 2023-10-19 PROCEDURE — 1159F MED LIST DOCD IN RCRD: CPT | Mod: CPTII,S$GLB,, | Performed by: PHYSICIAN ASSISTANT

## 2023-10-19 PROCEDURE — 3075F PR MOST RECENT SYSTOLIC BLOOD PRESS GE 130-139MM HG: ICD-10-PCS | Mod: CPTII,S$GLB,, | Performed by: PHYSICIAN ASSISTANT

## 2023-10-19 PROCEDURE — 3075F SYST BP GE 130 - 139MM HG: CPT | Mod: CPTII,S$GLB,, | Performed by: PHYSICIAN ASSISTANT

## 2023-10-19 PROCEDURE — 99999 PR PBB SHADOW E&M-EST. PATIENT-LVL III: CPT | Mod: PBBFAC,,, | Performed by: PHYSICIAN ASSISTANT

## 2023-10-19 PROCEDURE — 3044F HG A1C LEVEL LT 7.0%: CPT | Mod: CPTII,S$GLB,, | Performed by: PHYSICIAN ASSISTANT

## 2023-10-19 PROCEDURE — 1159F PR MEDICATION LIST DOCUMENTED IN MEDICAL RECORD: ICD-10-PCS | Mod: CPTII,S$GLB,, | Performed by: PHYSICIAN ASSISTANT

## 2023-10-19 PROCEDURE — 99203 PR OFFICE/OUTPT VISIT, NEW, LEVL III, 30-44 MIN: ICD-10-PCS | Mod: S$GLB,,, | Performed by: PHYSICIAN ASSISTANT

## 2023-10-19 NOTE — PROGRESS NOTES
Pulmonary Outpatient  Visit     Subjective:       Patient ID: Solomon Joseph is a 58 y.o. male.    Social History     Tobacco Use   Smoking Status Never   Smokeless Tobacco Never            Chief Complaint: Sleep Apnea      Solomon Joseph is 58 y.o.  Referred by Ora Qureshi PA-C  [id number] [address] [place] [address]   Colonoscopy done 6 years ago and provider suggested needed CHERISE evaluation  AD s/p prior STEMI with PCI of proximal and mid LAD in 2012, s/p recent NSTEMI 4/4/23 with successful PCI of LCX, one episode of PAF  Elevated BP  Sinus issues  Worse on bask  Recent PCI x2  Apnea and gasping for air  Bed time: 11 pm  Wake time: 6 am   Occupation: Business owner        SDI  Bed time 11 pm, wake time 6 am  SOL 30 mins  No restless legs, No sleep attacks  No signs of narcolepsy  Vivid dreams  No sleep walking  No violent movement in sleep  Deliberately sleep less to get things done    10/20/23  Here for cherise follow up  Sleep study reviewed, revealed mild CHERISE, therapy with CPAP indicated  He is willing to try CPAP with nasal mask  No changes in health since last visit        10/19/2023     4:05 PM   EPWORTH SLEEPINESS SCALE   Sitting and reading 0   Watching TV 0   Sitting, inactive in a public place (e.g. a theatre or a meeting) 0   As a passenger in a car for an hour without a break 0   Lying down to rest in the afternoon when circumstances permit 0   Sitting and talking to someone 0   Sitting quietly after a lunch without alcohol 0   In a car, while stopped for a few minutes in traffic 0   Total score 0         Review of Systems   Constitutional:  Positive for fatigue.   Respiratory:  Positive for apnea and snoring.    Psychiatric/Behavioral:  Positive for sleep disturbance.    All other systems reviewed and are negative.      Outpatient Encounter Medications as of 10/19/2023   Medication Sig Dispense Refill    aspirin 81 MG Chew Take 81 mg by mouth  once daily.      atorvastatin (LIPITOR) 80 MG tablet Take 0.5 tablets (40 mg total) by mouth once daily. 15 tablet 11    metoprolol tartrate (LOPRESSOR) 25 MG tablet Take 1 tablet (25 mg total) by mouth 2 (two) times daily. 60 tablet 11    saw palmetto 500 MG capsule Take 500 mg by mouth once daily.      ticagrelor (BRILINTA) 90 mg tablet Take 1 tablet (90 mg total) by mouth 2 (two) times daily. 60 tablet 11     Facility-Administered Encounter Medications as of 10/19/2023   Medication Dose Route Frequency Provider Last Rate Last Admin    regadenoson injection 0.4 mg  0.4 mg Intravenous Once Bea Adames, NP           The following portions of the patient's history were reviewed and updated as appropriate: He  has a past medical history of Coronary artery disease and Hypertension.  He does not have any pertinent problems on file.  He  has a past surgical history that includes Coronary stent placement; Left heart catheterization (Left, 4/4/2023); percutaneous coronary intervention, artery (N/A, 4/4/2023); Coronary stent placement (N/A, 4/4/2023); instantaneous wave-free ratio (ifr) (N/A, 4/4/2023); and ivus, coronary (4/4/2023).  His family history includes Heart disease in his father.  He  reports that he has never smoked. He has never used smokeless tobacco. He reports that he does not drink alcohol and does not use drugs.  He has a current medication list which includes the following prescription(s): aspirin, atorvastatin, metoprolol tartrate, saw palmetto, and ticagrelor, and the following Facility-Administered Medications: regadenoson.  Current Outpatient Medications on File Prior to Visit   Medication Sig Dispense Refill    aspirin 81 MG Chew Take 81 mg by mouth once daily.      atorvastatin (LIPITOR) 80 MG tablet Take 0.5 tablets (40 mg total) by mouth once daily. 15 tablet 11    metoprolol tartrate (LOPRESSOR) 25 MG tablet Take 1 tablet (25 mg total) by mouth 2 (two) times daily. 60 tablet 11    saw  "palmetto 500 MG capsule Take 500 mg by mouth once daily.      ticagrelor (BRILINTA) 90 mg tablet Take 1 tablet (90 mg total) by mouth 2 (two) times daily. 60 tablet 11     Current Facility-Administered Medications on File Prior to Visit   Medication Dose Route Frequency Provider Last Rate Last Admin    regadenoson injection 0.4 mg  0.4 mg Intravenous Once Bea Adames NP         He has No Known Allergies..      BP Readings from Last 3 Encounters:   10/19/23 138/74   08/29/23 118/80   07/07/23 98/62     Snoring / Sleep:     [place] Questionnaire (validated CHERISE screening questionnaire)    YES -- Snoring/apnea    YES -- Fatigue    Body mass index is 28.12 kg/m².  (>25 is overweight, >30 is obese)    Blood Pressure = Hypertension  (PreHTN 120-139/80-89, Stg1 140-159/90-99, Stg2 >160/>100)  [place] = 3 of three CHERISE categories are positive (high risk is 2-3 positive categories)     [place] Sleepiness Scale TOTAL =      EPWORTH SLEEPINESS SCALE 7/7/2023   Sitting and reading 1   Watching TV 1   Sitting, inactive in a public place (e.g. a theatre or a meeting) 0   As a passenger in a car for an hour without a break 0   Lying down to rest in the afternoon when circumstances permit 3   Sitting and talking to someone 0   Sitting quietly after a lunch without alcohol 0   In a car, while stopped for a few minutes in traffic 0   Total score 5      (validated sleepiness questionnaire with a higher score indicating greater sleepiness; range 0-24)  No flowsheet data found.    STOP-Bang Questionnaire (validated CHERISE screening questionnaire)  Negative unless checked off.  [x] Snoring    [x]  Tired/Fatigued/Sleepy  [] Obstruction (apneas/choking)  [x] Pressure (HTN)  [] BMI >35  [x] Age >50  [] Neck >40 cm  [x] Gender male   STOP-Bang = 5 (low risk 0-2,high risk 3-8)    Neck circumference 15"        MMRC Dyspnea Scale (4 is worst)     [x] MMRC 0: Dyspneic on strenuous excercise (0 points)    [] MMRC 1: Dyspneic on walking a " "slight hill (0 points)    [] MMRC 2: Dyspneic on walking level ground; must stop occasionally due to breathlessness (1 point)    [] MMRC 3: Must stop for breathlessness after walking 100 yards or after a few minutes (2 points)    [] MMRC 4: Cannot leave house; breathless on dressing/undressing (3 points)                     No flowsheet data found.              Objective:     Vital Signs (Most Recent)  Vital Signs  Pulse: 76  Resp: 17  SpO2: 95 %  BP: 138/74  Height and Weight  Height: 5' 7" (170.2 cm)  Weight: 81.4 kg (179 lb 9 oz)  BSA (Calculated - sq m): 1.96 sq meters  BMI (Calculated): 28.1  Weight in (lb) to have BMI = 25: 159.3]  Wt Readings from Last 2 Encounters:   10/19/23 81.4 kg (179 lb 9 oz)   08/29/23 81 kg (178 lb 9.2 oz)       Physical Exam  Vitals and nursing note reviewed.   Constitutional:       Appearance: Normal appearance.   HENT:      Head: Normocephalic and atraumatic.      Right Ear: Tympanic membrane normal.      Nose: Nose normal.      Mouth/Throat:      Comments: Malampati score 4  Neck 15"  Mild retrognatia  Eyes:      Pupils: Pupils are equal, round, and reactive to light.   Cardiovascular:      Rate and Rhythm: Normal rate and regular rhythm.      Pulses: Normal pulses.      Heart sounds: Normal heart sounds.   Pulmonary:      Effort: Pulmonary effort is normal.      Breath sounds: Normal breath sounds.   Abdominal:      General: Bowel sounds are normal.      Palpations: Abdomen is soft.   Musculoskeletal:         General: Normal range of motion.      Cervical back: Normal range of motion.   Skin:     General: Skin is warm and dry.      Capillary Refill: Capillary refill takes less than 2 seconds.   Neurological:      General: No focal deficit present.      Mental Status: He is alert and oriented to person, place, and time.   Psychiatric:         Mood and Affect: Mood normal.          Laboratory  Lab Results   Component Value Date    WBC 6.96 04/05/2023    RBC 5.32 04/05/2023    HGB " "15.5 04/05/2023    HCT 46.3 04/05/2023    MCV 87 04/05/2023    MCH 29.1 04/05/2023    MCHC 33.5 04/05/2023    RDW 12.1 04/05/2023     04/05/2023    MPV 11.3 04/05/2023    GRAN 4.2 04/05/2023    GRAN 60.8 04/05/2023    LYMPH 1.9 04/05/2023    LYMPH 26.9 04/05/2023    MONO 0.6 04/05/2023    MONO 8.0 04/05/2023    EOS 0.2 04/05/2023    BASO 0.04 04/05/2023    EOSINOPHIL 3.3 04/05/2023    BASOPHIL 0.6 04/05/2023       BMP  Lab Results   Component Value Date     04/05/2023     04/05/2023    K 4.1 04/05/2023    K 4.3 04/05/2023     04/05/2023     04/05/2023    CO2 22 (L) 04/05/2023    CO2 22 (L) 04/05/2023    BUN 16 04/05/2023    BUN 16 04/05/2023    CREATININE 0.8 04/05/2023    CREATININE 0.9 04/05/2023    CALCIUM 8.9 04/05/2023    CALCIUM 8.9 04/05/2023    ANIONGAP 10 04/05/2023    ANIONGAP 10 04/05/2023    ESTGFRAFRICA >60 04/29/2018    EGFRNONAA >60 04/29/2018    AST 20 04/05/2023    ALT 25 04/05/2023    PROT 6.6 04/05/2023          No results found for: "IGE"     No results found for: "ASPERGILLUS"  No results found for: "AFUMIGATUSCL"     No results found for: "ACE"     Diagnostic Results:  I have personally reviewed today the following studies:    X-Ray Chest PA And Lateral  Narrative: EXAM:  XR CHEST PA AND LATERAL    CLINICAL HISTORY: Fatigue    COMPARISON: 04/04/2023    FINDINGS: This examination consists of a two-view x-ray of the chest.  The size and contour of the heart are normal.  There is no focal pulmonary infiltrate visualized.  There is no pneumothorax or pleural effusion.  Impression:  There is no focal pulmonary infiltrate visualized.    Finalized on: 7/7/2023 10:13 AM By:  Skyler Cantu MD  [facility]# [id number]      2023-07-07 10:15:[id number]    [facility]       Assessment/Plan:     Problem List Items Addressed This Visit          Cardiac/Vascular    Paroxysmal atrial fibrillation    History of non-ST elevation myocardial infarction (NSTEMI)       Other    CHERISE (obstructive sleep " apnea) - Primary    Relevant Orders    CPAP FOR HOME USE     Continue cardiology follow up  Heart healthy diet and exercise encouraged   Start CPAP with nasal mask  Discussed therapeutic goals for CPAP: Ideal usage 100% of nights for 6-8 hours per night. Minimum usage is 70% of night for at least 4 hours per night.       Follow up in about 3 months (around 1/19/2024) for new cpap dl.    This note was prepared using voice recognition system and is likely to have sound alike errors that may have been overlooked even after proof reading.  Please call me with any questions    Discussed diagnosis, its evaluation, treatment and usual course. All questions answered.    Thank you for the courtesy of participating in the care of this patient    Johanne Rawls PA-C

## 2023-10-28 ENCOUNTER — HOSPITAL ENCOUNTER (OUTPATIENT)
Facility: HOSPITAL | Age: 58
Discharge: HOME | End: 2023-10-29
Attending: EMERGENCY MEDICINE | Admitting: FAMILY MEDICINE
Payer: COMMERCIAL

## 2023-10-28 DIAGNOSIS — R07.9 CHEST PAIN: Primary | ICD-10-CM

## 2023-10-28 DIAGNOSIS — R79.89 ELEVATED TROPONIN: ICD-10-CM

## 2023-10-28 LAB
ALBUMIN SERPL BCP-MCNC: 4.4 G/DL (ref 3.5–5.2)
ALP SERPL-CCNC: 85 U/L (ref 55–135)
ALT SERPL W/O P-5'-P-CCNC: 29 U/L (ref 10–44)
ANION GAP SERPL CALC-SCNC: 12 MMOL/L (ref 8–16)
AORTIC ROOT ANNULUS: 3.25 CM
ASCENDING AORTA: 3.39 CM
AST SERPL-CCNC: 23 U/L (ref 10–40)
AV INDEX (PROSTH): 0.8
AV MEAN GRADIENT: 3 MMHG
AV PEAK GRADIENT: 4 MMHG
AV VALVE AREA BY VELOCITY RATIO: 2.79 CM²
AV VALVE AREA: 2.77 CM²
AV VELOCITY RATIO: 0.81
BASOPHILS # BLD AUTO: 0.03 K/UL (ref 0–0.2)
BASOPHILS NFR BLD: 0.4 % (ref 0–1.9)
BILIRUB SERPL-MCNC: 0.6 MG/DL (ref 0.1–1)
BILIRUB UR QL STRIP: NEGATIVE
BNP SERPL-MCNC: 42 PG/ML (ref 0–99)
BSA FOR ECHO PROCEDURE: 1.93 M2
BUN SERPL-MCNC: 14 MG/DL (ref 6–20)
CALCIUM SERPL-MCNC: 9 MG/DL (ref 8.7–10.5)
CHLORIDE SERPL-SCNC: 104 MMOL/L (ref 95–110)
CHOLEST SERPL-MCNC: 136 MG/DL (ref 120–199)
CHOLEST/HDLC SERPL: 2.8 {RATIO} (ref 2–5)
CK SERPL-CCNC: 145 U/L (ref 20–200)
CLARITY UR: CLEAR
CO2 SERPL-SCNC: 22 MMOL/L (ref 23–29)
COLOR UR: YELLOW
CREAT SERPL-MCNC: 0.8 MG/DL (ref 0.5–1.4)
CV ECHO LV RWT: 0.43 CM
DIFFERENTIAL METHOD: NORMAL
DOP CALC AO PEAK VEL: 0.98 M/S
DOP CALC AO VTI: 20.6 CM
DOP CALC LVOT AREA: 3.5 CM2
DOP CALC LVOT DIAMETER: 2.1 CM
DOP CALC LVOT PEAK VEL: 0.79 M/S
DOP CALC LVOT STROKE VOLUME: 57.12 CM3
DOP CALC RVOT PEAK VEL: 0.66 M/S
DOP CALC RVOT VTI: 14.9 CM
DOP CALCLVOT PEAK VEL VTI: 16.5 CM
E WAVE DECELERATION TIME: 148.64 MSEC
E/A RATIO: 0.81
E/E' RATIO: 5.33 M/S
ECHO LV POSTERIOR WALL: 1.02 CM (ref 0.6–1.1)
EOSINOPHIL # BLD AUTO: 0.3 K/UL (ref 0–0.5)
EOSINOPHIL NFR BLD: 4.8 % (ref 0–8)
ERYTHROCYTE [DISTWIDTH] IN BLOOD BY AUTOMATED COUNT: 11.9 % (ref 11.5–14.5)
EST. GFR  (NO RACE VARIABLE): >60 ML/MIN/1.73 M^2
FRACTIONAL SHORTENING: 33 % (ref 28–44)
GLUCOSE SERPL-MCNC: 115 MG/DL (ref 70–110)
GLUCOSE UR QL STRIP: NEGATIVE
HCT VFR BLD AUTO: 45.5 % (ref 40–54)
HDLC SERPL-MCNC: 48 MG/DL (ref 40–75)
HDLC SERPL: 35.3 % (ref 20–50)
HGB BLD-MCNC: 16 G/DL (ref 14–18)
HGB UR QL STRIP: NEGATIVE
IMM GRANULOCYTES # BLD AUTO: 0.03 K/UL (ref 0–0.04)
IMM GRANULOCYTES NFR BLD AUTO: 0.4 % (ref 0–0.5)
INTERVENTRICULAR SEPTUM: 1.16 CM (ref 0.6–1.1)
IVC DIAMETER: 1.48 CM
IVRT: 72.31 MSEC
KETONES UR QL STRIP: NEGATIVE
LA MAJOR: 4.67 CM
LA MINOR: 4.72 CM
LA WIDTH: 3.4 CM
LDLC SERPL CALC-MCNC: 71.6 MG/DL (ref 63–159)
LEFT ATRIUM SIZE: 3.57 CM
LEFT ATRIUM VOLUME INDEX: 25.5 ML/M2
LEFT ATRIUM VOLUME: 48.44 CM3
LEFT INTERNAL DIMENSION IN SYSTOLE: 3.21 CM (ref 2.1–4)
LEFT VENTRICLE DIASTOLIC VOLUME INDEX: 55.78 ML/M2
LEFT VENTRICLE DIASTOLIC VOLUME: 105.99 ML
LEFT VENTRICLE MASS INDEX: 100 G/M2
LEFT VENTRICLE SYSTOLIC VOLUME INDEX: 21.7 ML/M2
LEFT VENTRICLE SYSTOLIC VOLUME: 41.32 ML
LEFT VENTRICULAR INTERNAL DIMENSION IN DIASTOLE: 4.77 CM (ref 3.5–6)
LEFT VENTRICULAR MASS: 189.61 G
LEUKOCYTE ESTERASE UR QL STRIP: NEGATIVE
LV LATERAL E/E' RATIO: 4.31 M/S
LV SEPTAL E/E' RATIO: 7 M/S
LVOT MG: 1.72 MMHG
LVOT MV: 0.65 CM/S
LYMPHOCYTES # BLD AUTO: 1.6 K/UL (ref 1–4.8)
LYMPHOCYTES NFR BLD: 23.2 % (ref 18–48)
MAGNESIUM SERPL-MCNC: 1.9 MG/DL (ref 1.6–2.6)
MCH RBC QN AUTO: 30.1 PG (ref 27–31)
MCHC RBC AUTO-ENTMCNC: 35.2 G/DL (ref 32–36)
MCV RBC AUTO: 86 FL (ref 82–98)
MONOCYTES # BLD AUTO: 0.5 K/UL (ref 0.3–1)
MONOCYTES NFR BLD: 7.9 % (ref 4–15)
MV PEAK A VEL: 0.69 M/S
MV PEAK E VEL: 0.56 M/S
MV STENOSIS PRESSURE HALF TIME: 43.11 MS
MV VALVE AREA P 1/2 METHOD: 5.1 CM2
NEUTROPHILS # BLD AUTO: 4.3 K/UL (ref 1.8–7.7)
NEUTROPHILS NFR BLD: 63.3 % (ref 38–73)
NITRITE UR QL STRIP: NEGATIVE
NONHDLC SERPL-MCNC: 88 MG/DL
NRBC BLD-RTO: 0 /100 WBC
PH UR STRIP: 7 [PH] (ref 5–8)
PHOSPHATE SERPL-MCNC: 2.5 MG/DL (ref 2.7–4.5)
PISA MRMAX VEL: 4.16 M/S
PISA TR MAX VEL: 1.95 M/S
PLATELET # BLD AUTO: 179 K/UL (ref 150–450)
PMV BLD AUTO: 10.3 FL (ref 9.2–12.9)
POTASSIUM SERPL-SCNC: 4.2 MMOL/L (ref 3.5–5.1)
PROT SERPL-MCNC: 7.3 G/DL (ref 6–8.4)
PROT UR QL STRIP: NEGATIVE
PV MEAN GRADIENT: 1 MMHG
RA MAJOR: 3.65 CM
RA PRESSURE ESTIMATED: 3 MMHG
RA WIDTH: 2.7 CM
RBC # BLD AUTO: 5.31 M/UL (ref 4.6–6.2)
RV TB RVSP: 5 MMHG
SODIUM SERPL-SCNC: 138 MMOL/L (ref 136–145)
SP GR UR STRIP: 1.02 (ref 1–1.03)
STJ: 3.53 CM
TDI LATERAL: 0.13 M/S
TDI SEPTAL: 0.08 M/S
TDI: 0.11 M/S
TR MAX PG: 15 MMHG
TRICUSPID ANNULAR PLANE SYSTOLIC EXCURSION: 1.72 CM
TRIGL SERPL-MCNC: 82 MG/DL (ref 30–150)
TROPONIN I SERPL DL<=0.01 NG/ML-MCNC: 0.1 NG/ML (ref 0–0.03)
TROPONIN I SERPL DL<=0.01 NG/ML-MCNC: 0.1 NG/ML (ref 0–0.03)
TROPONIN I SERPL DL<=0.01 NG/ML-MCNC: 0.12 NG/ML (ref 0–0.03)
TV REST PULMONARY ARTERY PRESSURE: 18 MMHG
URN SPEC COLLECT METH UR: NORMAL
UROBILINOGEN UR STRIP-ACNC: NEGATIVE EU/DL
WBC # BLD AUTO: 6.84 K/UL (ref 3.9–12.7)
Z-SCORE OF LEFT VENTRICULAR DIMENSION IN END DIASTOLE: -1.11
Z-SCORE OF LEFT VENTRICULAR DIMENSION IN END SYSTOLE: -0.18

## 2023-10-28 PROCEDURE — 85025 COMPLETE CBC W/AUTO DIFF WBC: CPT | Performed by: EMERGENCY MEDICINE

## 2023-10-28 PROCEDURE — 82550 ASSAY OF CK (CPK): CPT | Performed by: EMERGENCY MEDICINE

## 2023-10-28 PROCEDURE — 99285 EMERGENCY DEPT VISIT HI MDM: CPT | Mod: 25

## 2023-10-28 PROCEDURE — 81003 URINALYSIS AUTO W/O SCOPE: CPT | Performed by: EMERGENCY MEDICINE

## 2023-10-28 PROCEDURE — 83735 ASSAY OF MAGNESIUM: CPT | Performed by: FAMILY MEDICINE

## 2023-10-28 PROCEDURE — 84100 ASSAY OF PHOSPHORUS: CPT | Performed by: FAMILY MEDICINE

## 2023-10-28 PROCEDURE — 93005 ELECTROCARDIOGRAM TRACING: CPT

## 2023-10-28 PROCEDURE — 84484 ASSAY OF TROPONIN QUANT: CPT | Performed by: EMERGENCY MEDICINE

## 2023-10-28 PROCEDURE — 25000003 PHARM REV CODE 250: Performed by: FAMILY MEDICINE

## 2023-10-28 PROCEDURE — 93010 ELECTROCARDIOGRAM REPORT: CPT | Mod: ,,, | Performed by: INTERNAL MEDICINE

## 2023-10-28 PROCEDURE — 84484 ASSAY OF TROPONIN QUANT: CPT | Mod: 91 | Performed by: FAMILY MEDICINE

## 2023-10-28 PROCEDURE — G0378 HOSPITAL OBSERVATION PER HR: HCPCS

## 2023-10-28 PROCEDURE — 93010 EKG 12-LEAD: ICD-10-PCS | Mod: ,,, | Performed by: INTERNAL MEDICINE

## 2023-10-28 PROCEDURE — 80053 COMPREHEN METABOLIC PANEL: CPT | Performed by: EMERGENCY MEDICINE

## 2023-10-28 PROCEDURE — 80061 LIPID PANEL: CPT | Performed by: FAMILY MEDICINE

## 2023-10-28 PROCEDURE — 99214 OFFICE O/P EST MOD 30 MIN: CPT | Mod: 25,,, | Performed by: INTERNAL MEDICINE

## 2023-10-28 PROCEDURE — 99214 PR OFFICE/OUTPT VISIT, EST, LEVL IV, 30-39 MIN: ICD-10-PCS | Mod: 25,,, | Performed by: INTERNAL MEDICINE

## 2023-10-28 PROCEDURE — 36415 COLL VENOUS BLD VENIPUNCTURE: CPT | Performed by: FAMILY MEDICINE

## 2023-10-28 PROCEDURE — 83880 ASSAY OF NATRIURETIC PEPTIDE: CPT | Performed by: EMERGENCY MEDICINE

## 2023-10-28 RX ORDER — ATORVASTATIN CALCIUM 40 MG/1
40 TABLET, FILM COATED ORAL DAILY
Status: DISCONTINUED | OUTPATIENT
Start: 2023-10-28 | End: 2023-10-29 | Stop reason: HOSPADM

## 2023-10-28 RX ORDER — METOPROLOL TARTRATE 25 MG/1
25 TABLET, FILM COATED ORAL 2 TIMES DAILY
Status: DISCONTINUED | OUTPATIENT
Start: 2023-10-28 | End: 2023-10-29 | Stop reason: HOSPADM

## 2023-10-28 RX ORDER — HYDROCODONE BITARTRATE AND ACETAMINOPHEN 5; 325 MG/1; MG/1
1 TABLET ORAL EVERY 4 HOURS PRN
Status: DISCONTINUED | OUTPATIENT
Start: 2023-10-28 | End: 2023-10-29 | Stop reason: HOSPADM

## 2023-10-28 RX ORDER — POLYETHYLENE GLYCOL 3350 17 G/17G
17 POWDER, FOR SOLUTION ORAL DAILY
Status: DISCONTINUED | OUTPATIENT
Start: 2023-10-28 | End: 2023-10-29 | Stop reason: HOSPADM

## 2023-10-28 RX ORDER — SODIUM CHLORIDE 0.9 % (FLUSH) 0.9 %
10 SYRINGE (ML) INJECTION
Status: DISCONTINUED | OUTPATIENT
Start: 2023-10-28 | End: 2023-10-29 | Stop reason: HOSPADM

## 2023-10-28 RX ORDER — PROCHLORPERAZINE EDISYLATE 5 MG/ML
5 INJECTION INTRAMUSCULAR; INTRAVENOUS EVERY 6 HOURS PRN
Status: DISCONTINUED | OUTPATIENT
Start: 2023-10-28 | End: 2023-10-29 | Stop reason: HOSPADM

## 2023-10-28 RX ORDER — NAPROXEN SODIUM 220 MG/1
81 TABLET, FILM COATED ORAL DAILY
Status: DISCONTINUED | OUTPATIENT
Start: 2023-10-28 | End: 2023-10-29 | Stop reason: HOSPADM

## 2023-10-28 RX ORDER — ACETAMINOPHEN 325 MG/1
650 TABLET ORAL EVERY 4 HOURS PRN
Status: DISCONTINUED | OUTPATIENT
Start: 2023-10-28 | End: 2023-10-29 | Stop reason: HOSPADM

## 2023-10-28 RX ORDER — ONDANSETRON 8 MG/1
8 TABLET, ORALLY DISINTEGRATING ORAL EVERY 8 HOURS PRN
Status: DISCONTINUED | OUTPATIENT
Start: 2023-10-28 | End: 2023-10-29 | Stop reason: HOSPADM

## 2023-10-28 RX ADMIN — ASPIRIN 81 MG CHEWABLE TABLET 81 MG: 81 TABLET CHEWABLE at 10:10

## 2023-10-28 RX ADMIN — METOPROLOL TARTRATE 25 MG: 25 TABLET, FILM COATED ORAL at 10:10

## 2023-10-28 RX ADMIN — METOPROLOL TARTRATE 25 MG: 25 TABLET, FILM COATED ORAL at 08:10

## 2023-10-28 RX ADMIN — POLYETHYLENE GLYCOL 3350 17 G: 17 POWDER, FOR SOLUTION ORAL at 10:10

## 2023-10-28 RX ADMIN — ATORVASTATIN CALCIUM 40 MG: 40 TABLET, FILM COATED ORAL at 10:10

## 2023-10-28 RX ADMIN — TICAGRELOR 90 MG: 90 TABLET ORAL at 09:10

## 2023-10-28 RX ADMIN — TICAGRELOR 90 MG: 90 TABLET ORAL at 12:10

## 2023-10-28 NOTE — SUBJECTIVE & OBJECTIVE
Past Medical History:   Diagnosis Date    Coronary artery disease     Hypertension        Past Surgical History:   Procedure Laterality Date    CORONARY STENT PLACEMENT      CORONARY STENT PLACEMENT N/A 4/4/2023    Procedure: INSERTION, STENT, CORONARY ARTERY;  Surgeon: Glen Lemus MD;  Location: [facility] CATH LAB;  Service: Cardiology;  Laterality: N/A;    INSTANTANEOUS WAVE-FREE RATIO (IFR) N/A 4/4/2023    Procedure: Instantaneous Wave-Free Ratio (IFR);  Surgeon: Glen Lemus MD;  Location: [facility] CATH LAB;  Service: Cardiology;  Laterality: N/A;    IVUS, CORONARY  4/4/2023    Procedure: IVUS, Coronary;  Surgeon: Glen Lemus MD;  Location: [facility] CATH LAB;  Service: Cardiology;;    LEFT HEART CATHETERIZATION Left 4/4/2023    Procedure: Left heart cath;  Surgeon: Glen Lemus MD;  Location: [facility] CATH LAB;  Service: Cardiology;  Laterality: Left;    PERCUTANEOUS CORONARY INTERVENTION, ARTERY N/A 4/4/2023    Procedure: Percutaneous coronary intervention;  Surgeon: Glen Lemus MD;  Location: [facility] CATH LAB;  Service: Cardiology;  Laterality: N/A;       Review of patient's allergies indicates:  No Known Allergies    Current Facility-Administered Medications on File Prior to Encounter   Medication    regadenoson injection 0.4 mg     Current Outpatient Medications on File Prior to Encounter   Medication Sig    aspirin 81 MG Chew Take 81 mg by mouth once daily.    atorvastatin (LIPITOR) 80 MG tablet Take 0.5 tablets (40 mg total) by mouth once daily.    metoprolol tartrate (LOPRESSOR) 25 MG tablet Take 1 tablet (25 mg total) by mouth 2 (two) times daily.    saw palmetto 500 MG capsule Take 500 mg by mouth once daily.    ticagrelor (BRILINTA) 90 mg tablet Take 1 tablet (90 mg total) by mouth 2 (two) times daily.     Family History       Problem Relation (Age of Onset)    Heart disease Father          Tobacco Use    Smoking status: Never    Smokeless tobacco: Never   Substance and Sexual Activity    Alcohol use: No     Drug use: No    Sexual activity: Not on file     Review of Systems   Cardiovascular:  Positive for chest pain.   Gastrointestinal:  Positive for constipation.     Objective:     Vital Signs (Most Recent):  Temp: 98.7 °F (37.1 °C) (10/28/23 0930)  Pulse: 67 (10/28/23 1025)  Resp: 18 (10/28/23 0930)  BP: (!) 146/79 (10/28/23 1025)  SpO2: 96 % (10/28/23 0930) Vital Signs (24h Range):  Temp:  [98.5 °F (36.9 °C)-98.7 °F (37.1 °C)] 98.7 °F (37.1 °C)  Pulse:  [67-86] 67  Resp:  [16-18] 18  SpO2:  [96 %-98 %] 96 %  BP: (139-151)/(71-81) 146/79     Weight: 78.5 kg (173 lb)  Body mass index is 27.1 kg/m².     Physical Exam  Vitals and nursing note reviewed.   Constitutional:       Appearance: Normal appearance.   Cardiovascular:      Rate and Rhythm: Normal rate and regular rhythm.      Pulses: Normal pulses.      Heart sounds: Normal heart sounds.   Pulmonary:      Effort: Pulmonary effort is normal.      Breath sounds: Normal breath sounds.   Abdominal:      General: Bowel sounds are normal. There is no distension.      Palpations: Abdomen is soft.      Tenderness: There is no abdominal tenderness.   Musculoskeletal:         General: No swelling. Normal range of motion.   Skin:     General: Skin is warm and dry.   Neurological:      Mental Status: He is alert and oriented to person, place, and time.                Significant Labs: All pertinent labs within the past 24 hours have been reviewed.    Significant Imaging: I have reviewed all pertinent imaging results/findings within the past 24 hours.

## 2023-10-28 NOTE — HPI
Mr. Joseph is a 58 year old male with a history CHERISE (cpap is on order),  HLD, CAD s/p prior STEMI with PCI of proximal and mid LAD in 2012, s/p recent NSTEMI 4/4/23 with successful PCI of LCX, one episode of PAF who presents to the ED for eval for CP onset 2 weeks ago, states it is chest pressure mild, intermittent, no exacerbating or relieving factors.  Patient states at first he thought it was gas pain d/t his constipation.  Last night he reports mid sternal chest pain and numbness in both arm.  Patient reports compliance with mediations.  IN the ED, EKG NSR, Lab work unremarkable except for Trop 0.1.  UA with no signs of infection, Chest xray with no acute processes.  Currently chest pain free.  Patient will be admitted to observation for chest pain.  Case discussed with cardiology, no hep gtt at this time, will trend troponin and monitor.     Code Status Full  Surrogate decision maker Tia

## 2023-10-28 NOTE — CONSULTS
[facility]  Cardiology  Consult Note    Patient Name: Solomon Joseph  MRN: [id number]  Admission Date: 10/28/2023  Hospital Length of Stay: 0 days  Code Status: Full Code   Attending Provider: Chinedu Lancaster MD   Consulting Provider: Skyler Rios MD  Primary Care Physician: Tunde Feliz MD  Principal Problem:Chest pain    Patient information was obtained from patient and ER records.     Inpatient consult to Cardiology  Consult performed by: Skyler Rios MD  Consult ordered by: Chinedu Lancaster MD        Subjective:     Chief Complaint:  Chest pain     HPI:    Mr. Joseph is a 58 year old male with a history CHERISE (cpap is on order),  HLD, CAD s/p prior STEMI with PCI of proximal and mid LAD in 2012, s/p recent NSTEMI 4/4/23 with successful PCI of LCX, one episode of PAF who presents to the ED for eval for CP onset 2 weeks ago, states it is chest pressure mild, intermittent, no exacerbating or relieving factors.  Patient states at first he thought it was gas pain d/t his constipation.  Last night he reports mid sternal chest pain and numbness in both arm.  Patient reports compliance with mediations.  IN the ED, EKG NSR, Lab work unremarkable except for Trop 0.1.  UA with no signs of infection, Chest xray with no acute processes.  Currently chest pain free.  Patient will be admitted to observation for chest pain.  Case discussed with cardiology, no hep gtt at this time, will trend troponin and monitor.      Code Status Full      Past Medical History:   Diagnosis Date    Coronary artery disease     Hypertension        Past Surgical History:   Procedure Laterality Date    CORONARY STENT PLACEMENT      CORONARY STENT PLACEMENT N/A 4/4/2023    Procedure: INSERTION, STENT, CORONARY ARTERY;  Surgeon: Glen Lemus MD;  Location: [facility] CATH LAB;  Service: Cardiology;  Laterality: N/A;    INSTANTANEOUS WAVE-FREE RATIO (IFR) N/A 4/4/2023    Procedure: Instantaneous Wave-Free Ratio (IFR);   Surgeon: Glen Lemus MD;  Location: [facility] CATH LAB;  Service: Cardiology;  Laterality: N/A;    IVUS, CORONARY  4/4/2023    Procedure: IVUS, Coronary;  Surgeon: Glen Lemus MD;  Location: [facility] CATH LAB;  Service: Cardiology;;    LEFT HEART CATHETERIZATION Left 4/4/2023    Procedure: Left heart cath;  Surgeon: Glen Lemus MD;  Location: [facility] CATH LAB;  Service: Cardiology;  Laterality: Left;    PERCUTANEOUS CORONARY INTERVENTION, ARTERY N/A 4/4/2023    Procedure: Percutaneous coronary intervention;  Surgeon: Glen Lemus MD;  Location: [facility] CATH LAB;  Service: Cardiology;  Laterality: N/A;       Review of patient's allergies indicates:  No Known Allergies    Current Facility-Administered Medications on File Prior to Encounter   Medication    regadenoson injection 0.4 mg     Current Outpatient Medications on File Prior to Encounter   Medication Sig    aspirin 81 MG Chew Take 81 mg by mouth once daily.    atorvastatin (LIPITOR) 80 MG tablet Take 0.5 tablets (40 mg total) by mouth once daily.    metoprolol tartrate (LOPRESSOR) 25 MG tablet Take 1 tablet (25 mg total) by mouth 2 (two) times daily.    saw palmetto 500 MG capsule Take 500 mg by mouth once daily.    ticagrelor (BRILINTA) 90 mg tablet Take 1 tablet (90 mg total) by mouth 2 (two) times daily.     Family History       Problem Relation (Age of Onset)    Heart disease Father          Tobacco Use    Smoking status: Never    Smokeless tobacco: Never   Substance and Sexual Activity    Alcohol use: No    Drug use: No    Sexual activity: Not on file     Review of Systems   Constitutional: Negative for chills, diaphoresis, night sweats, weight gain and weight loss.   HENT:  Negative for congestion, hoarse voice, sore throat and stridor.    Eyes:  Negative for double vision and pain.   Cardiovascular:  Negative for chest pain, claudication, cyanosis, dyspnea on exertion, irregular heartbeat, leg swelling, near-syncope, orthopnea,  palpitations, paroxysmal nocturnal dyspnea and syncope.   Respiratory:  Negative for cough, hemoptysis, shortness of breath, sleep disturbances due to breathing, snoring, sputum production and wheezing.    Endocrine: Negative for cold intolerance, heat intolerance and polydipsia.   Hematologic/Lymphatic: Negative for bleeding problem. Does not bruise/bleed easily.   Skin:  Negative for color change, dry skin and rash.   Musculoskeletal:  Negative for joint swelling and muscle cramps.   Gastrointestinal:  Negative for bloating, abdominal pain, constipation, diarrhea, dysphagia, melena, nausea and vomiting.   Genitourinary:  Negative for flank pain and urgency.   Neurological:  Negative for dizziness, focal weakness, headaches, light-headedness, loss of balance, seizures and weakness.   Psychiatric/Behavioral:  Negative for altered mental status and memory loss. The patient is not nervous/anxious.      Objective:     Vital Signs (Most Recent):  Temp: 98.7 °F (37.1 °C) (10/28/23 0930)  Pulse: 62 (10/28/23 1200)  Resp: 19 (10/28/23 1200)  BP: 134/78 (10/28/23 1200)  SpO2: 97 % (10/28/23 1200) Vital Signs (24h Range):  Temp:  [98.5 °F (36.9 °C)-98.7 °F (37.1 °C)] 98.7 °F (37.1 °C)  Pulse:  [62-86] 62  Resp:  [16-19] 19  SpO2:  [96 %-98 %] 97 %  BP: (134-151)/(71-81) 134/78     Weight: 78.5 kg (173 lb)  Body mass index is 27.1 kg/m².    SpO2: 97 %       No intake or output data in the 24 hours ending 10/28/23 1244    Lines/Drains/Airways       Peripheral Intravenous Line  Duration                  Peripheral IV - Single Lumen 10/28/23 0837 18 G Anterior;Proximal;Right Forearm <1 day                     Physical Exam  Eyes:      Pupils: Pupils are equal, round, and reactive to light.   Neck:      Trachea: No tracheal deviation.   Cardiovascular:      Rate and Rhythm: Normal rate and regular rhythm.      Pulses: Intact distal pulses.           Carotid pulses are 2+ on the right side and 2+ on the left side.       Radial  pulses are 2+ on the right side and 2+ on the left side.        Femoral pulses are 2+ on the right side and 2+ on the left side.       Popliteal pulses are 2+ on the right side and 2+ on the left side.        Dorsalis pedis pulses are 2+ on the right side and 2+ on the left side.        Posterior tibial pulses are 2+ on the right side and 2+ on the left side.      Heart sounds: Normal heart sounds. No murmur heard.     No friction rub. No gallop.   Pulmonary:      Effort: Pulmonary effort is normal. No respiratory distress.      Breath sounds: Normal breath sounds. No stridor. No wheezing or rales.   Chest:      Chest wall: No tenderness.   Abdominal:      General: There is no distension.      Tenderness: There is no abdominal tenderness. There is no rebound.   Musculoskeletal:         General: No tenderness.      Cervical back: Normal range of motion.   Skin:     General: Skin is warm and dry.   Neurological:      Mental Status: He is alert and oriented to person, place, and time.          Significant Labs: CMP   Recent Labs   Lab 10/28/23  0834      K 4.2      CO2 22*   *   BUN 14   CREATININE 0.8   CALCIUM 9.0   PROT 7.3   ALBUMIN 4.4   BILITOT 0.6   ALKPHOS 85   AST 23   ALT 29   ANIONGAP 12    and Troponin   Recent Labs   Lab 10/28/23  0834   TROPONINI 0.105*       Significant Imaging: Echocardiogram: Transthoracic echo (TTE) complete (Cupid Only):   Results for orders placed or performed during the hospital encounter of 10/28/23   Echo   Result Value Ref Range    BSA 1.93 m2    LVOT stroke volume 57.12 cm3    LVIDd 4.77 3.5 - 6.0 cm    LV Systolic Volume 41.32 mL    LV Systolic Volume Index 21.7 mL/m2    LVIDs 3.21 2.1 - 4.0 cm    LV Diastolic Volume 105.99 mL    LV Diastolic Volume Index 55.78 mL/m2    IVS 1.16 (A) 0.6 - 1.1 cm    LVOT diameter 2.10 cm    LVOT area 3.5 cm2    FS 33 28 - 44 %    Left Ventricle Relative Wall Thickness 0.43 cm    Posterior Wall 1.02 0.6 - 1.1 cm    LV mass  189.61 g    LV Mass Index 100 g/m2    MV Peak E Jonh 0.56 m/s    TDI LATERAL 0.13 m/s    TDI SEPTAL 0.08 m/s    E/E' ratio 5.33 m/s    MV Peak A Jonh 0.69 m/s    TR Max Jonh 1.95 m/s    E/A ratio 0.81     IVRT 72.31 msec    E wave deceleration time 148.64 msec    LV SEPTAL E/E' RATIO 7.00 m/s    LV LATERAL E/E' RATIO 4.31 m/s    LVOT peak jonh 0.79 m/s    Left Ventricular Outflow Tract Mean Velocity 0.65 cm/s    Left Ventricular Outflow Tract Mean Gradient 1.72 mmHg    LA size 3.57 cm    Left Atrium Minor Axis 4.72 cm    Left Atrium Major Axis 4.67 cm    RVOT peak VTI 14.9 cm    TAPSE 1.72 cm    RA Major Axis 3.65 cm    AV mean gradient 3 mmHg    AV peak gradient 4 mmHg    Ao peak jonh 0.98 m/s    Ao VTI 20.60 cm    LVOT peak VTI 16.50 cm    AV valve area 2.77 cm²    AV Velocity Ratio 0.81     AV index (prosthetic) 0.80     ROBYN by Velocity Ratio 2.79 cm²    Mr max jonh 4.16 m/s    MV stenosis pressure 1/2 time 43.11 ms    MV valve area p 1/2 method 5.10 cm2    Triscuspid Valve Regurgitation Peak Gradient 15 mmHg    PV mean gradient 1 mmHg    RVOT peak jonh 0.66 m/s    Ao root annulus 3.25 cm    STJ 3.53 cm    Ascending aorta 3.39 cm    IVC diameter 1.48 cm    Mean e' 0.11 m/s    ZLVIDS -0.18     ZLVIDD -1.11     LA Volume Index 25.5 mL/m2    LA volume 48.44 cm3    LA WIDTH 3.4 cm    RA Width 2.7 cm    TV resting pulmonary artery pressure 18 mmHg    RV TB RVSP 5 mmHg    Est. RA pres 3 mmHg    Narrative      Left Ventricle: The left ventricle is normal in size. Normal wall   thickness. There is concentric remodeling. Normal wall motion. There is   normal systolic function with a visually estimated ejection fraction of 60   - 65%. There is normal diastolic function.    Right Ventricle: Normal right ventricular cavity size. Wall thickness   is normal. Right ventricle wall motion  is normal. Systolic function is   normal.    Pulmonary Artery: The estimated pulmonary artery systolic pressure is   18 mmHg.    IVC/SVC: Normal  venous pressure at 3 mmHg.       Assessment and Plan:     * Chest pain  follow    CAD (coronary artery disease)  Trend troponin        VTE Risk Mitigation (From admission, onward)         Ordered     Place sequential compression device  Until discontinued         10/28/23 0930     IP VTE LOW RISK PATIENT  Once         10/28/23 0930                Thank you for your consult. I will follow-up with patient. Please contact us if you have any additional questions.    Skyler Rios MD  Cardiology   [facility] - Emergency Dept.

## 2023-10-28 NOTE — HPI
Mr. Joseph is a 58 year old male with a history CHERISE (cpap is on order),  HLD, CAD s/p prior STEMI with PCI of proximal and mid LAD in 2012, s/p recent NSTEMI 4/4/23 with successful PCI of LCX, one episode of PAF who presents to the ED for eval for CP onset 2 weeks ago, states it is chest pressure mild, intermittent, no exacerbating or relieving factors.  Patient states at first he thought it was gas pain d/t his constipation.  Last night he reports mid sternal chest pain and numbness in both arm.  Patient reports compliance with mediations.  IN the ED, EKG NSR, Lab work unremarkable except for Trop 0.1.  UA with no signs of infection, Chest xray with no acute processes.  Currently chest pain free.  Patient will be admitted to observation for chest pain.  Case discussed with cardiology, no hep gtt at this time, will trend troponin and monitor.      Code Status Full

## 2023-10-28 NOTE — ASSESSMENT & PLAN NOTE
- trend trop  - continue cardiac monitoring  - checking echo  - cardiology consulted, will follow-up further recs

## 2023-10-28 NOTE — PLAN OF CARE
NS on tele. Room air. Pt denies pain. Ambulatory. Bed in lowest position and call light within reach.

## 2023-10-28 NOTE — ASSESSMENT & PLAN NOTE
CAD s/p prior STEMI with PCI of proximal and mid LAD in 2012, s/p recent NSTEMI 4/4/23 with successful PCI of LCX,  - continue statin, asa, brilinta

## 2023-10-28 NOTE — SUBJECTIVE & OBJECTIVE
Past Medical History:   Diagnosis Date    Coronary artery disease     Hypertension        Past Surgical History:   Procedure Laterality Date    CORONARY STENT PLACEMENT      CORONARY STENT PLACEMENT N/A 4/4/2023    Procedure: INSERTION, STENT, CORONARY ARTERY;  Surgeon: Glen Lemus MD;  Location: [facility] CATH LAB;  Service: Cardiology;  Laterality: N/A;    INSTANTANEOUS WAVE-FREE RATIO (IFR) N/A 4/4/2023    Procedure: Instantaneous Wave-Free Ratio (IFR);  Surgeon: Glen Lemus MD;  Location: [facility] CATH LAB;  Service: Cardiology;  Laterality: N/A;    IVUS, CORONARY  4/4/2023    Procedure: IVUS, Coronary;  Surgeon: Glen Lemus MD;  Location: [facility] CATH LAB;  Service: Cardiology;;    LEFT HEART CATHETERIZATION Left 4/4/2023    Procedure: Left heart cath;  Surgeon: Glen Lemus MD;  Location: [facility] CATH LAB;  Service: Cardiology;  Laterality: Left;    PERCUTANEOUS CORONARY INTERVENTION, ARTERY N/A 4/4/2023    Procedure: Percutaneous coronary intervention;  Surgeon: Glen Lemus MD;  Location: [facility] CATH LAB;  Service: Cardiology;  Laterality: N/A;       Review of patient's allergies indicates:  No Known Allergies    Current Facility-Administered Medications on File Prior to Encounter   Medication    regadenoson injection 0.4 mg     Current Outpatient Medications on File Prior to Encounter   Medication Sig    aspirin 81 MG Chew Take 81 mg by mouth once daily.    atorvastatin (LIPITOR) 80 MG tablet Take 0.5 tablets (40 mg total) by mouth once daily.    metoprolol tartrate (LOPRESSOR) 25 MG tablet Take 1 tablet (25 mg total) by mouth 2 (two) times daily.    saw palmetto 500 MG capsule Take 500 mg by mouth once daily.    ticagrelor (BRILINTA) 90 mg tablet Take 1 tablet (90 mg total) by mouth 2 (two) times daily.     Family History       Problem Relation (Age of Onset)    Heart disease Father          Tobacco Use    Smoking status: Never    Smokeless tobacco: Never   Substance and Sexual Activity    Alcohol use: No     Drug use: No    Sexual activity: Not on file     Review of Systems   Constitutional: Negative for chills, diaphoresis, night sweats, weight gain and weight loss.   HENT:  Negative for congestion, hoarse voice, sore throat and stridor.    Eyes:  Negative for double vision and pain.   Cardiovascular:  Negative for chest pain, claudication, cyanosis, dyspnea on exertion, irregular heartbeat, leg swelling, near-syncope, orthopnea, palpitations, paroxysmal nocturnal dyspnea and syncope.   Respiratory:  Negative for cough, hemoptysis, shortness of breath, sleep disturbances due to breathing, snoring, sputum production and wheezing.    Endocrine: Negative for cold intolerance, heat intolerance and polydipsia.   Hematologic/Lymphatic: Negative for bleeding problem. Does not bruise/bleed easily.   Skin:  Negative for color change, dry skin and rash.   Musculoskeletal:  Negative for joint swelling and muscle cramps.   Gastrointestinal:  Negative for bloating, abdominal pain, constipation, diarrhea, dysphagia, melena, nausea and vomiting.   Genitourinary:  Negative for flank pain and urgency.   Neurological:  Negative for dizziness, focal weakness, headaches, light-headedness, loss of balance, seizures and weakness.   Psychiatric/Behavioral:  Negative for altered mental status and memory loss. The patient is not nervous/anxious.      Objective:     Vital Signs (Most Recent):  Temp: 98.7 °F (37.1 °C) (10/28/23 0930)  Pulse: 62 (10/28/23 1200)  Resp: 19 (10/28/23 1200)  BP: 134/78 (10/28/23 1200)  SpO2: 97 % (10/28/23 1200) Vital Signs (24h Range):  Temp:  [98.5 °F (36.9 °C)-98.7 °F (37.1 °C)] 98.7 °F (37.1 °C)  Pulse:  [62-86] 62  Resp:  [16-19] 19  SpO2:  [96 %-98 %] 97 %  BP: (134-151)/(71-81) 134/78     Weight: 78.5 kg (173 lb)  Body mass index is 27.1 kg/m².    SpO2: 97 %       No intake or output data in the 24 hours ending 10/28/23 1244    Lines/Drains/Airways       Peripheral Intravenous Line  Duration                   Peripheral IV - Single Lumen 10/28/23 0837 18 G Anterior;Proximal;Right Forearm <1 day                     Physical Exam  Eyes:      Pupils: Pupils are equal, round, and reactive to light.   Neck:      Trachea: No tracheal deviation.   Cardiovascular:      Rate and Rhythm: Normal rate and regular rhythm.      Pulses: Intact distal pulses.           Carotid pulses are 2+ on the right side and 2+ on the left side.       Radial pulses are 2+ on the right side and 2+ on the left side.        Femoral pulses are 2+ on the right side and 2+ on the left side.       Popliteal pulses are 2+ on the right side and 2+ on the left side.        Dorsalis pedis pulses are 2+ on the right side and 2+ on the left side.        Posterior tibial pulses are 2+ on the right side and 2+ on the left side.      Heart sounds: Normal heart sounds. No murmur heard.     No friction rub. No gallop.   Pulmonary:      Effort: Pulmonary effort is normal. No respiratory distress.      Breath sounds: Normal breath sounds. No stridor. No wheezing or rales.   Chest:      Chest wall: No tenderness.   Abdominal:      General: There is no distension.      Tenderness: There is no abdominal tenderness. There is no rebound.   Musculoskeletal:         General: No tenderness.      Cervical back: Normal range of motion.   Skin:     General: Skin is warm and dry.   Neurological:      Mental Status: He is alert and oriented to person, place, and time.          Significant Labs: CMP   Recent Labs   Lab 10/28/23  0834      K 4.2      CO2 22*   *   BUN 14   CREATININE 0.8   CALCIUM 9.0   PROT 7.3   ALBUMIN 4.4   BILITOT 0.6   ALKPHOS 85   AST 23   ALT 29   ANIONGAP 12    and Troponin   Recent Labs   Lab 10/28/23  0834   TROPONINI 0.105*       Significant Imaging: Echocardiogram: Transthoracic echo (TTE) complete (Cupid Only):   Results for orders placed or performed during the hospital encounter of 10/28/23   Echo   Result Value Ref Range    BSA  1.93 m2    LVOT stroke volume 57.12 cm3    LVIDd 4.77 3.5 - 6.0 cm    LV Systolic Volume 41.32 mL    LV Systolic Volume Index 21.7 mL/m2    LVIDs 3.21 2.1 - 4.0 cm    LV Diastolic Volume 105.99 mL    LV Diastolic Volume Index 55.78 mL/m2    IVS 1.16 (A) 0.6 - 1.1 cm    LVOT diameter 2.10 cm    LVOT area 3.5 cm2    FS 33 28 - 44 %    Left Ventricle Relative Wall Thickness 0.43 cm    Posterior Wall 1.02 0.6 - 1.1 cm    LV mass 189.61 g    LV Mass Index 100 g/m2    MV Peak E Jonh 0.56 m/s    TDI LATERAL 0.13 m/s    TDI SEPTAL 0.08 m/s    E/E' ratio 5.33 m/s    MV Peak A Jonh 0.69 m/s    TR Max Jonh 1.95 m/s    E/A ratio 0.81     IVRT 72.31 msec    E wave deceleration time 148.64 msec    LV SEPTAL E/E' RATIO 7.00 m/s    LV LATERAL E/E' RATIO 4.31 m/s    LVOT peak jonh 0.79 m/s    Left Ventricular Outflow Tract Mean Velocity 0.65 cm/s    Left Ventricular Outflow Tract Mean Gradient 1.72 mmHg    LA size 3.57 cm    Left Atrium Minor Axis 4.72 cm    Left Atrium Major Axis 4.67 cm    RVOT peak VTI 14.9 cm    TAPSE 1.72 cm    RA Major Axis 3.65 cm    AV mean gradient 3 mmHg    AV peak gradient 4 mmHg    Ao peak jonh 0.98 m/s    Ao VTI 20.60 cm    LVOT peak VTI 16.50 cm    AV valve area 2.77 cm²    AV Velocity Ratio 0.81     AV index (prosthetic) 0.80     ROBYN by Velocity Ratio 2.79 cm²    Mr max jonh 4.16 m/s    MV stenosis pressure 1/2 time 43.11 ms    MV valve area p 1/2 method 5.10 cm2    Triscuspid Valve Regurgitation Peak Gradient 15 mmHg    PV mean gradient 1 mmHg    RVOT peak jonh 0.66 m/s    Ao root annulus 3.25 cm    STJ 3.53 cm    Ascending aorta 3.39 cm    IVC diameter 1.48 cm    Mean e' 0.11 m/s    ZLVIDS -0.18     ZLVIDD -1.11     LA Volume Index 25.5 mL/m2    LA volume 48.44 cm3    LA WIDTH 3.4 cm    RA Width 2.7 cm    TV resting pulmonary artery pressure 18 mmHg    RV TB RVSP 5 mmHg    Est. RA pres 3 mmHg    Narrative      Left Ventricle: The left ventricle is normal in size. Normal wall   thickness. There is  concentric remodeling. Normal wall motion. There is   normal systolic function with a visually estimated ejection fraction of 60   - 65%. There is normal diastolic function.    Right Ventricle: Normal right ventricular cavity size. Wall thickness   is normal. Right ventricle wall motion  is normal. Systolic function is   normal.    Pulmonary Artery: The estimated pulmonary artery systolic pressure is   18 mmHg.    IVC/SVC: Normal venous pressure at 3 mmHg.

## 2023-10-28 NOTE — ED PROVIDER NOTES
SCRIBE #1 NOTE: I, Marlene Hernandez, am scribing for, and in the presence of, Mihir Morse MD. I have scribed the entire note.       History     Chief Complaint   Patient presents with    Chest Pain     Chest pain started 2 weeks ago, intermittent pain, pain to mid sternal area, states this morning both of his arms went numb. Hx of stent placement.     Review of patient's allergies indicates:  No Known Allergies      History of Present Illness     HPI    10/28/2023, 8:02 AM  History obtained from the patient      History of Present Illness: Solomon Joseph is a 58 y.o. male patient with a PMHx of CAD and a stent placement presents to the Emergency Department for evaluation of CP onset 2 weeks ago. Pt reports pain to mid sternal area and numbness in both arms this morning.  Symptoms are intermittent and moderate in severity. No mitigating or exacerbating factors reported. Other associated sxs not reported . Patient denies any fever, N/V, cough, and all other sxs at this time. Prior Tx not reported. No further complaints or concerns at this time.       Arrival mode: Personal transportation     PCP: Tunde Feliz MD        Past Medical History:  Past Medical History:   Diagnosis Date    Coronary artery disease     Hypertension        Past Surgical History:  Past Surgical History:   Procedure Laterality Date    CORONARY STENT PLACEMENT      CORONARY STENT PLACEMENT N/A 4/4/2023    Procedure: INSERTION, STENT, CORONARY ARTERY;  Surgeon: Glen Lemus MD;  Location: [facility] CATH LAB;  Service: Cardiology;  Laterality: N/A;    INSTANTANEOUS WAVE-FREE RATIO (IFR) N/A 4/4/2023    Procedure: Instantaneous Wave-Free Ratio (IFR);  Surgeon: Glen Lemus MD;  Location: [facility] CATH LAB;  Service: Cardiology;  Laterality: N/A;    IVUS, CORONARY  4/4/2023    Procedure: IVUS, Coronary;  Surgeon: Glen Lemus MD;  Location: [facility] CATH LAB;  Service: Cardiology;;    LEFT HEART CATHETERIZATION Left 4/4/2023    Procedure: Left  heart cath;  Surgeon: Glen Lemus MD;  Location: [facility] CATH LAB;  Service: Cardiology;  Laterality: Left;    PERCUTANEOUS CORONARY INTERVENTION, ARTERY N/A 4/4/2023    Procedure: Percutaneous coronary intervention;  Surgeon: Glen Lemus MD;  Location: [facility] CATH LAB;  Service: Cardiology;  Laterality: N/A;         Family History:  Family History   Problem Relation Age of Onset    Heart disease Father        Social History:  Social History     Tobacco Use    Smoking status: Never    Smokeless tobacco: Never   Substance and Sexual Activity    Alcohol use: No    Drug use: No    Sexual activity: Not on file        Review of Systems     Review of Systems   Constitutional:  Negative for fever.   HENT:  Negative for sore throat.    Respiratory:  Negative for cough and shortness of breath.    Cardiovascular:  Positive for chest pain.   Gastrointestinal:  Negative for nausea and vomiting.   Genitourinary:  Negative for dysuria.   Musculoskeletal:  Negative for back pain.   Skin:  Negative for rash.   Neurological:  Positive for numbness (upper extremities bilaterally). Negative for weakness.   Hematological:  Does not bruise/bleed easily.   All other systems reviewed and are negative.       Physical Exam     Initial Vitals [10/28/23 0757]   BP Pulse Resp Temp SpO2   (!) 151/76 86 16 98.5 °F (36.9 °C) 98 %      MAP       --          Physical Exam  Nursing Notes and Vital Signs Reviewed.  Constitutional: Patient is in no acute distress. Well-developed and well-nourished.  Head: Atraumatic. Normocephalic.  Eyes: PERRL. EOM intact. Conjunctivae are not pale. No scleral icterus.  ENT: Mucous membranes are moist. Oropharynx is clear and symmetric.    Neck: Supple. Full ROM. No lymphadenopathy.  Cardiovascular: Regular rate. Regular rhythm. No murmurs, rubs, or gallops. Distal pulses are 2+ and symmetric.  Pulmonary/Chest: No respiratory distress. Clear to auscultation bilaterally. No wheezing or rales.  Abdominal: Soft and  non-distended.  There is no tenderness.  No rebound, guarding, or rigidity. Good bowel sounds.  Genitourinary: No CVA tenderness  Musculoskeletal: Moves all extremities. No obvious deformities. No edema. No calf tenderness.  Skin: Warm and dry.  Neurological:  Alert, awake, and appropriate.  Normal speech.  No acute focal neurological deficits are appreciated.  Psychiatric: Normal affect. Good eye contact. Appropriate in content.     ED Course   Procedures  ED Vital Signs:  Vitals:    10/28/23 0757 10/28/23 0831 10/28/23 0839 10/28/23 0930   BP: (!) 151/76  (!) 145/71 139/81   Pulse: 86 72 77 71   Resp: 16  18 18   Temp: 98.5 °F (36.9 °C)   98.7 °F (37.1 °C)   TempSrc: Oral   Oral   SpO2: 98%  98% 96%   Weight: 78.9 kg (173 lb 15.1 oz)          Abnormal Lab Results:  Labs Reviewed   COMPREHENSIVE METABOLIC PANEL - Abnormal; Notable for the following components:       Result Value    CO2 22 (*)     Glucose 115 (*)     All other components within normal limits   TROPONIN I - Abnormal; Notable for the following components:    Troponin I 0.105 (*)     All other components within normal limits   CBC W/ AUTO DIFFERENTIAL   URINALYSIS, REFLEX TO URINE CULTURE    Narrative:     Specimen Source->Urine   B-TYPE NATRIURETIC PEPTIDE   CK   PHOSPHORUS   MAGNESIUM   LIPID PANEL        All Lab Results:  Results for orders placed or performed during the hospital encounter of 10/28/23   CBC Auto Differential   Result Value Ref Range    WBC 6.84 3.90 - 12.70 K/uL    RBC 5.31 4.60 - 6.20 M/uL    Hemoglobin 16.0 14.0 - 18.0 g/dL    Hematocrit 45.5 40.0 - 54.0 %    MCV 86 82 - 98 fL    MCH 30.1 27.0 - 31.0 pg    MCHC 35.2 32.0 - 36.0 g/dL    RDW 11.9 11.5 - 14.5 %    Platelets 179 150 - 450 K/uL    MPV 10.3 9.2 - 12.9 fL    Immature Granulocytes 0.4 0.0 - 0.5 %    Gran # (ANC) 4.3 1.8 - 7.7 K/uL    Immature Grans (Abs) 0.03 0.00 - 0.04 K/uL    Lymph # 1.6 1.0 - 4.8 K/uL    Mono # 0.5 0.3 - 1.0 K/uL    Eos # 0.3 0.0 - 0.5 K/uL    Baso #  0.03 0.00 - 0.20 K/uL    nRBC 0 0 /100 WBC    Gran % 63.3 38.0 - 73.0 %    Lymph % 23.2 18.0 - 48.0 %    Mono % 7.9 4.0 - 15.0 %    Eosinophil % 4.8 0.0 - 8.0 %    Basophil % 0.4 0.0 - 1.9 %    Differential Method Automated    Comprehensive Metabolic Panel   Result Value Ref Range    Sodium 138 136 - 145 mmol/L    Potassium 4.2 3.5 - 5.1 mmol/L    Chloride 104 95 - 110 mmol/L    CO2 22 (L) 23 - 29 mmol/L    Glucose 115 (H) 70 - 110 mg/dL    BUN 14 6 - 20 mg/dL    Creatinine 0.8 0.5 - 1.4 mg/dL    Calcium 9.0 8.7 - 10.5 mg/dL    Total Protein 7.3 6.0 - 8.4 g/dL    Albumin 4.4 3.5 - 5.2 g/dL    Total Bilirubin 0.6 0.1 - 1.0 mg/dL    Alkaline Phosphatase 85 55 - 135 U/L    AST 23 10 - 40 U/L    ALT 29 10 - 44 U/L    eGFR >60 >60 mL/min/1.73 m^2    Anion Gap 12 8 - 16 mmol/L   Urinalysis, Reflex to Urine Culture Urine, Clean Catch    Specimen: Urine   Result Value Ref Range    Specimen UA Urine, Clean Catch     Color, UA Yellow Yellow, Straw, Dorcas    Appearance, UA Clear Clear    pH, UA 7.0 5.0 - 8.0    Specific Gravity, UA 1.020 1.005 - 1.030    Protein, UA Negative Negative    Glucose, UA Negative Negative    Ketones, UA Negative Negative    Bilirubin (UA) Negative Negative    Occult Blood UA Negative Negative    Nitrite, UA Negative Negative    Urobilinogen, UA Negative <2.0 EU/dL    Leukocytes, UA Negative Negative   BNP   Result Value Ref Range    BNP 42 0 - 99 pg/mL   CK   Result Value Ref Range     20 - 200 U/L   Troponin I   Result Value Ref Range    Troponin I 0.105 (H) 0.000 - 0.026 ng/mL         Imaging Results:  Imaging Results              X-Ray Chest AP Portable (Final result)  Result time 10/28/23 08:21:22      Final result by Theresa Tadeo MD (Timothy) (10/28/23 08:21:22)                   Impression:      Negative single view chest x-ray.      Electronically signed by: Theresa Tadeo MD  Date:    10/28/2023  Time:    08:21               Narrative:    EXAMINATION:  XR CHEST AP  PORTABLE    CLINICAL HISTORY:  , Chest pain;    COMPARISON:  Comparison 07/07/2023.    FINDINGS:  Heart size is normal. The lung fields are clear. No acute cardiopulmonary infiltrate.                                       The EKG was ordered, reviewed, and independently interpreted by the ED provider.  Interpretation time: 07:48  Rate: 83 BPM  Rhythm: normal sinus rhythm  Interpretation: When compared with ECG of 08-MAY-2023 11:19, vent. rate increased by 31 BPM. No STEMI             The Emergency Provider reviewed the vital signs and test results, which are outlined above.     ED Discussion     9:20 AM: Dr. Skyler Rios recommends obs and trend.    9:28 AM: Discussed case with Skyler Rios MD  (cardiology). Dr. Chinedu Lancaster agrees with current care and management of pt and accepts admission.   Admitting Service: hospital medicine   Admitting Physician: Dr. Chinedu Lancaster  Admit to: obs. tele     9:29 AM: Re-evaluated pt. I have discussed test results, shared treatment plan, and the need for admission with patient and family at bedside. Pt and family express understanding at this time and agree with all information. All questions answered. Pt and family have no further questions or concerns at this time. Pt is ready for admit.          Medical Decision Making  Intermittent chest pain for the last few weeks.  Worse this morning with arm tingling  DDx: chest pain, NSTEMI    Amount and/or Complexity of Data Reviewed  Labs: ordered. Decision-making details documented in ED Course.     Details: Troponin 0.105  Radiology: ordered and independent interpretation performed. Decision-making details documented in ED Course.  ECG/medicine tests: ordered and independent interpretation performed. Decision-making details documented in ED Course.    Risk  Decision regarding hospitalization.                ED Medication(s):  Medications   aspirin chewable tablet 81 mg (has no administration in time range)    atorvastatin tablet 40 mg (has no administration in time range)   metoprolol tartrate (LOPRESSOR) tablet 25 mg (has no administration in time range)   ticagrelor tablet 90 mg (has no administration in time range)   sodium chloride 0.9% flush 10 mL (has no administration in time range)   acetaminophen tablet 650 mg (has no administration in time range)   HYDROcodone-acetaminophen 5-325 mg per tablet 1 tablet (has no administration in time range)   polyethylene glycol packet 17 g (has no administration in time range)   ondansetron disintegrating tablet 8 mg (has no administration in time range)   prochlorperazine injection Soln 5 mg (has no administration in time range)       New Prescriptions    No medications on file               Scribe Attestation:   Scribe #1: I performed the above scribed service and the documentation accurately describes the services I performed. I attest to the accuracy of the note.     Attending:   Physician Attestation Statement for Scribe #1: I, Mihir Morse MD, personally performed the services described in this documentation, as scribed by Marlene Hernandez, in my presence, and it is both accurate and complete.           Clinical Impression       ICD-10-CM ICD-9-CM   1. Chest pain  R07.9 786.50   2. Elevated troponin  R79.89 790.6       Disposition:   Disposition: Placed in Observation  Condition: Mihir Mckeon MD  10/28/23 0959

## 2023-10-28 NOTE — H&P
[facility] - Emergency Dept.  [place] [facility]  History & Physical    Patient Name: Solomon Joseph  MRN: [id number]  Patient Class: OP- Observation  Admission Date: 10/28/2023  Attending Physician: Chinedu Lancaster MD   Primary Care Provider: Tunde Feliz MD         Patient information was obtained from patient, past medical records and ER records.     Subjective:     Principal Problem:Chest pain    Chief Complaint:   Chief Complaint   Patient presents with    Chest Pain     Chest pain started 2 weeks ago, intermittent pain, pain to mid sternal area, states this morning both of his arms went numb. Hx of stent placement.        HPI: Mr. Joseph is a 58 year old male with a history CHERISE (cpap is on order),  HLD, CAD s/p prior STEMI with PCI of proximal and mid LAD in 2012, s/p recent NSTEMI 4/4/23 with successful PCI of LCX, one episode of PAF who presents to the ED for eval for CP onset 2 weeks ago, states it is chest pressure mild, intermittent, no exacerbating or relieving factors.  Patient states at first he thought it was gas pain d/t his constipation.  Last night he reports mid sternal chest pain and numbness in both arm.  Patient reports compliance with mediations.  IN the ED, EKG NSR, Lab work unremarkable except for Trop 0.1.  UA with no signs of infection, Chest xray with no acute processes.  Currently chest pain free.  Patient will be admitted to observation for chest pain.  Case discussed with cardiology, no hep gtt at this time, will trend troponin and monitor.     Code Status Full  Surrogate decision maker Tia      Past Medical History:   Diagnosis Date    Coronary artery disease     Hypertension        Past Surgical History:   Procedure Laterality Date    CORONARY STENT PLACEMENT      CORONARY STENT PLACEMENT N/A 4/4/2023    Procedure: INSERTION, STENT, CORONARY ARTERY;  Surgeon: Glen Lemus MD;  Location: [facility] CATH LAB;  Service: Cardiology;  Laterality: N/A;    INSTANTANEOUS WAVE-FREE RATIO  (IFR) N/A 4/4/2023    Procedure: Instantaneous Wave-Free Ratio (IFR);  Surgeon: Glen Lemus MD;  Location: [facility] CATH LAB;  Service: Cardiology;  Laterality: N/A;    IVUS, CORONARY  4/4/2023    Procedure: IVUS, Coronary;  Surgeon: Glen Lemus MD;  Location: [facility] CATH LAB;  Service: Cardiology;;    LEFT HEART CATHETERIZATION Left 4/4/2023    Procedure: Left heart cath;  Surgeon: Glen Lemus MD;  Location: [facility] CATH LAB;  Service: Cardiology;  Laterality: Left;    PERCUTANEOUS CORONARY INTERVENTION, ARTERY N/A 4/4/2023    Procedure: Percutaneous coronary intervention;  Surgeon: Glen Lemus MD;  Location: [facility] CATH LAB;  Service: Cardiology;  Laterality: N/A;       Review of patient's allergies indicates:  No Known Allergies    Current Facility-Administered Medications on File Prior to Encounter   Medication    regadenoson injection 0.4 mg     Current Outpatient Medications on File Prior to Encounter   Medication Sig    aspirin 81 MG Chew Take 81 mg by mouth once daily.    atorvastatin (LIPITOR) 80 MG tablet Take 0.5 tablets (40 mg total) by mouth once daily.    metoprolol tartrate (LOPRESSOR) 25 MG tablet Take 1 tablet (25 mg total) by mouth 2 (two) times daily.    saw palmetto 500 MG capsule Take 500 mg by mouth once daily.    ticagrelor (BRILINTA) 90 mg tablet Take 1 tablet (90 mg total) by mouth 2 (two) times daily.     Family History       Problem Relation (Age of Onset)    Heart disease Father          Tobacco Use    Smoking status: Never    Smokeless tobacco: Never   Substance and Sexual Activity    Alcohol use: No    Drug use: No    Sexual activity: Not on file     Review of Systems   Cardiovascular:  Positive for chest pain.   Gastrointestinal:  Positive for constipation.     Objective:     Vital Signs (Most Recent):  Temp: 98.7 °F (37.1 °C) (10/28/23 0930)  Pulse: 67 (10/28/23 1025)  Resp: 18 (10/28/23 0930)  BP: (!) 146/79 (10/28/23 1025)  SpO2: 96 % (10/28/23 0930) Vital Signs  (24h Range):  Temp:  [98.5 °F (36.9 °C)-98.7 °F (37.1 °C)] 98.7 °F (37.1 °C)  Pulse:  [67-86] 67  Resp:  [16-18] 18  SpO2:  [96 %-98 %] 96 %  BP: (139-151)/(71-81) 146/79     Weight: 78.5 kg (173 lb)  Body mass index is 27.1 kg/m².     Physical Exam  Vitals and nursing note reviewed.   Constitutional:       Appearance: Normal appearance.   Cardiovascular:      Rate and Rhythm: Normal rate and regular rhythm.      Pulses: Normal pulses.      Heart sounds: Normal heart sounds.   Pulmonary:      Effort: Pulmonary effort is normal.      Breath sounds: Normal breath sounds.   Abdominal:      General: Bowel sounds are normal. There is no distension.      Palpations: Abdomen is soft.      Tenderness: There is no abdominal tenderness.   Musculoskeletal:         General: No swelling. Normal range of motion.   Skin:     General: Skin is warm and dry.   Neurological:      Mental Status: He is alert and oriented to person, place, and time.                Significant Labs: All pertinent labs within the past 24 hours have been reviewed.    Significant Imaging: I have reviewed all pertinent imaging results/findings within the past 24 hours.    Assessment/Plan:     * Chest pain  - trend trop  - continue cardiac monitoring  - checking echo  - cardiology consulted, will follow-up further recs      Elevated troponin  - trend, see plan above      CHERISE (obstructive sleep apnea)  - follows with pulmonology, recent sleep study done, awaiting cpap       CAD (coronary artery disease)  CAD s/p prior STEMI with PCI of proximal and mid LAD in 2012, s/p recent NSTEMI 4/4/23 with successful PCI of LCX,  - continue statin, asa, brilinta     Paroxysmal atrial fibrillation  - one time episode, currently SR, continue cardiac monitoring        VTE Risk Mitigation (From admission, onward)         Ordered     Place sequential compression device  Until discontinued         10/28/23 0930     IP VTE LOW RISK PATIENT  Once         10/28/23 0930                 On 10/28/2023, patient should be placed in hospital observation services under my care in collaboration with Dr. Lancaster.          Francine Moore NP  Department of Hospital Medicine  [facility] - Emergency Dept.

## 2023-10-29 VITALS
OXYGEN SATURATION: 95 % | HEIGHT: 67 IN | TEMPERATURE: 98 F | RESPIRATION RATE: 14 BRPM | DIASTOLIC BLOOD PRESSURE: 74 MMHG | HEART RATE: 61 BPM | BODY MASS INDEX: 27.15 KG/M2 | SYSTOLIC BLOOD PRESSURE: 129 MMHG | WEIGHT: 173 LBS

## 2023-10-29 LAB
ANION GAP SERPL CALC-SCNC: 11 MMOL/L (ref 8–16)
BASOPHILS # BLD AUTO: 0.04 K/UL (ref 0–0.2)
BASOPHILS NFR BLD: 0.6 % (ref 0–1.9)
BUN SERPL-MCNC: 13 MG/DL (ref 6–20)
CALCIUM SERPL-MCNC: 8.9 MG/DL (ref 8.7–10.5)
CHLORIDE SERPL-SCNC: 104 MMOL/L (ref 95–110)
CO2 SERPL-SCNC: 23 MMOL/L (ref 23–29)
CREAT SERPL-MCNC: 0.9 MG/DL (ref 0.5–1.4)
DIFFERENTIAL METHOD: NORMAL
EOSINOPHIL # BLD AUTO: 0.4 K/UL (ref 0–0.5)
EOSINOPHIL NFR BLD: 5.7 % (ref 0–8)
ERYTHROCYTE [DISTWIDTH] IN BLOOD BY AUTOMATED COUNT: 12.1 % (ref 11.5–14.5)
EST. GFR  (NO RACE VARIABLE): >60 ML/MIN/1.73 M^2
GLUCOSE SERPL-MCNC: 100 MG/DL (ref 70–110)
HCT VFR BLD AUTO: 45.5 % (ref 40–54)
HGB BLD-MCNC: 15.7 G/DL (ref 14–18)
IMM GRANULOCYTES # BLD AUTO: 0.02 K/UL (ref 0–0.04)
IMM GRANULOCYTES NFR BLD AUTO: 0.3 % (ref 0–0.5)
LYMPHOCYTES # BLD AUTO: 2.5 K/UL (ref 1–4.8)
LYMPHOCYTES NFR BLD: 37.7 % (ref 18–48)
MCH RBC QN AUTO: 30.1 PG (ref 27–31)
MCHC RBC AUTO-ENTMCNC: 34.5 G/DL (ref 32–36)
MCV RBC AUTO: 87 FL (ref 82–98)
MONOCYTES # BLD AUTO: 0.8 K/UL (ref 0.3–1)
MONOCYTES NFR BLD: 11.3 % (ref 4–15)
NEUTROPHILS # BLD AUTO: 2.9 K/UL (ref 1.8–7.7)
NEUTROPHILS NFR BLD: 44.4 % (ref 38–73)
NRBC BLD-RTO: 0 /100 WBC
PLATELET # BLD AUTO: 195 K/UL (ref 150–450)
PMV BLD AUTO: 10.9 FL (ref 9.2–12.9)
POTASSIUM SERPL-SCNC: 4.3 MMOL/L (ref 3.5–5.1)
RBC # BLD AUTO: 5.22 M/UL (ref 4.6–6.2)
SODIUM SERPL-SCNC: 138 MMOL/L (ref 136–145)
WBC # BLD AUTO: 6.61 K/UL (ref 3.9–12.7)

## 2023-10-29 PROCEDURE — G0378 HOSPITAL OBSERVATION PER HR: HCPCS

## 2023-10-29 PROCEDURE — 99213 OFFICE O/P EST LOW 20 MIN: CPT | Mod: ,,, | Performed by: INTERNAL MEDICINE

## 2023-10-29 PROCEDURE — 99213 PR OFFICE/OUTPT VISIT, EST, LEVL III, 20-29 MIN: ICD-10-PCS | Mod: ,,, | Performed by: INTERNAL MEDICINE

## 2023-10-29 PROCEDURE — 80048 BASIC METABOLIC PNL TOTAL CA: CPT | Performed by: FAMILY MEDICINE

## 2023-10-29 PROCEDURE — 36415 COLL VENOUS BLD VENIPUNCTURE: CPT | Performed by: FAMILY MEDICINE

## 2023-10-29 PROCEDURE — 25000003 PHARM REV CODE 250: Performed by: FAMILY MEDICINE

## 2023-10-29 PROCEDURE — 85025 COMPLETE CBC W/AUTO DIFF WBC: CPT | Performed by: FAMILY MEDICINE

## 2023-10-29 PROCEDURE — 25000003 PHARM REV CODE 250: Performed by: HOSPITALIST

## 2023-10-29 RX ORDER — CALCIUM CARBONATE 200(500)MG
500 TABLET,CHEWABLE ORAL 2 TIMES DAILY
Status: DISCONTINUED | OUTPATIENT
Start: 2023-10-29 | End: 2023-10-29 | Stop reason: HOSPADM

## 2023-10-29 RX ORDER — PANTOPRAZOLE SODIUM 40 MG/1
40 TABLET, DELAYED RELEASE ORAL DAILY
Status: DISCONTINUED | OUTPATIENT
Start: 2023-10-29 | End: 2023-10-29 | Stop reason: HOSPADM

## 2023-10-29 RX ORDER — PANTOPRAZOLE SODIUM 40 MG/1
40 TABLET, DELAYED RELEASE ORAL DAILY
Qty: 30 TABLET | Refills: 0 | Status: SHIPPED | OUTPATIENT
Start: 2023-10-29 | End: 2023-11-28

## 2023-10-29 RX ADMIN — PANTOPRAZOLE SODIUM 40 MG: 40 TABLET, DELAYED RELEASE ORAL at 12:10

## 2023-10-29 RX ADMIN — CALCIUM CARBONATE (ANTACID) CHEW TAB 500 MG 500 MG: 500 CHEW TAB at 12:10

## 2023-10-29 RX ADMIN — ATORVASTATIN CALCIUM 40 MG: 40 TABLET, FILM COATED ORAL at 08:10

## 2023-10-29 RX ADMIN — TICAGRELOR 90 MG: 90 TABLET ORAL at 08:10

## 2023-10-29 RX ADMIN — METOPROLOL TARTRATE 25 MG: 25 TABLET, FILM COATED ORAL at 08:10

## 2023-10-29 RX ADMIN — POLYETHYLENE GLYCOL 3350 17 G: 17 POWDER, FOR SOLUTION ORAL at 08:10

## 2023-10-29 RX ADMIN — ASPIRIN 81 MG CHEWABLE TABLET 81 MG: 81 TABLET CHEWABLE at 08:10

## 2023-10-29 NOTE — HOSPITAL COURSE
10/29/23-Patient stable, no further changes,  Plan discharge and evaluate GI causes of intermittent pain, no exertional features, Normal echo and EKG,.  Prior stress test was normal.

## 2023-10-29 NOTE — SUBJECTIVE & OBJECTIVE
Interval History: no symptoms    Review of Systems   Constitutional: Negative for chills, diaphoresis, night sweats, weight gain and weight loss.   HENT:  Negative for congestion, hoarse voice, sore throat and stridor.    Eyes:  Negative for double vision and pain.   Cardiovascular:  Negative for chest pain, claudication, cyanosis, dyspnea on exertion, irregular heartbeat, leg swelling, near-syncope, orthopnea, palpitations, paroxysmal nocturnal dyspnea and syncope.   Respiratory:  Negative for cough, hemoptysis, shortness of breath, sleep disturbances due to breathing, snoring, sputum production and wheezing.    Endocrine: Negative for cold intolerance, heat intolerance and polydipsia.   Hematologic/Lymphatic: Negative for bleeding problem. Does not bruise/bleed easily.   Skin:  Negative for color change, dry skin and rash.   Musculoskeletal:  Negative for joint swelling and muscle cramps.   Gastrointestinal:  Negative for bloating, abdominal pain, constipation, diarrhea, dysphagia, melena, nausea and vomiting.   Genitourinary:  Negative for flank pain and urgency.   Neurological:  Negative for dizziness, focal weakness, headaches, light-headedness, loss of balance, seizures and weakness.   Psychiatric/Behavioral:  Negative for altered mental status and memory loss. The patient is not nervous/anxious.      Objective:     Vital Signs (Most Recent):  Temp: 98 °F (36.7 °C) (10/29/23 0713)  Pulse: 61 (10/29/23 0713)  Resp: 14 (10/29/23 0713)  BP: 129/74 (10/29/23 0810)  SpO2: 95 % (10/29/23 0713) Vital Signs (24h Range):  Temp:  [97.5 °F (36.4 °C)-98 °F (36.7 °C)] 98 °F (36.7 °C)  Pulse:  [52-70] 61  Resp:  [14-18] 14  SpO2:  [94 %-97 %] 95 %  BP: (118-136)/(66-87) 129/74     Weight: 78.5 kg (173 lb)  Body mass index is 27.1 kg/m².     SpO2: 95 %       No intake or output data in the 24 hours ending 10/29/23 1215    Lines/Drains/Airways       Peripheral Intravenous Line  Duration                  Peripheral IV - Single  Lumen 10/28/23 0837 18 G Anterior;Proximal;Right Forearm 1 day                       Physical Exam  Eyes:      Pupils: Pupils are equal, round, and reactive to light.   Neck:      Trachea: No tracheal deviation.   Cardiovascular:      Rate and Rhythm: Normal rate and regular rhythm.      Pulses: Intact distal pulses.           Carotid pulses are 2+ on the right side and 2+ on the left side.       Radial pulses are 2+ on the right side and 2+ on the left side.        Femoral pulses are 2+ on the right side and 2+ on the left side.       Popliteal pulses are 2+ on the right side and 2+ on the left side.        Dorsalis pedis pulses are 2+ on the right side and 2+ on the left side.        Posterior tibial pulses are 2+ on the right side and 2+ on the left side.      Heart sounds: Normal heart sounds. No murmur heard.     No friction rub. No gallop.   Pulmonary:      Effort: Pulmonary effort is normal. No respiratory distress.      Breath sounds: Normal breath sounds. No stridor. No wheezing or rales.   Chest:      Chest wall: No tenderness.   Abdominal:      General: There is no distension.      Tenderness: There is no abdominal tenderness. There is no rebound.   Musculoskeletal:         General: No tenderness.      Cervical back: Normal range of motion.   Skin:     General: Skin is warm and dry.   Neurological:      Mental Status: He is alert and oriented to person, place, and time.            Significant Labs: CBC   Recent Labs   Lab 10/28/23  0834 10/29/23  0431   WBC 6.84 6.61   HGB 16.0 15.7   HCT 45.5 45.5    195    and Troponin   Recent Labs   Lab 10/28/23  0834 10/28/23  1229 10/28/23  1637   TROPONINI 0.105* 0.120* 0.100*       Significant Imaging: Echocardiogram: Transthoracic echo (TTE) complete (Cupid Only):   Results for orders placed or performed during the hospital encounter of 10/28/23   Echo   Result Value Ref Range    BSA 1.93 m2    LVOT stroke volume 57.12 cm3    LVIDd 4.77 3.5 - 6.0 cm    LV  Systolic Volume 41.32 mL    LV Systolic Volume Index 21.7 mL/m2    LVIDs 3.21 2.1 - 4.0 cm    LV Diastolic Volume 105.99 mL    LV Diastolic Volume Index 55.78 mL/m2    IVS 1.16 (A) 0.6 - 1.1 cm    LVOT diameter 2.10 cm    LVOT area 3.5 cm2    FS 33 28 - 44 %    Left Ventricle Relative Wall Thickness 0.43 cm    Posterior Wall 1.02 0.6 - 1.1 cm    LV mass 189.61 g    LV Mass Index 100 g/m2    MV Peak E Jonh 0.56 m/s    TDI LATERAL 0.13 m/s    TDI SEPTAL 0.08 m/s    E/E' ratio 5.33 m/s    MV Peak A Jonh 0.69 m/s    TR Max Jonh 1.95 m/s    E/A ratio 0.81     IVRT 72.31 msec    E wave deceleration time 148.64 msec    LV SEPTAL E/E' RATIO 7.00 m/s    LV LATERAL E/E' RATIO 4.31 m/s    LVOT peak jonh 0.79 m/s    Left Ventricular Outflow Tract Mean Velocity 0.65 cm/s    Left Ventricular Outflow Tract Mean Gradient 1.72 mmHg    LA size 3.57 cm    Left Atrium Minor Axis 4.72 cm    Left Atrium Major Axis 4.67 cm    RVOT peak VTI 14.9 cm    TAPSE 1.72 cm    RA Major Axis 3.65 cm    AV mean gradient 3 mmHg    AV peak gradient 4 mmHg    Ao peak jonh 0.98 m/s    Ao VTI 20.60 cm    LVOT peak VTI 16.50 cm    AV valve area 2.77 cm²    AV Velocity Ratio 0.81     AV index (prosthetic) 0.80     ROBYN by Velocity Ratio 2.79 cm²    Mr max jonh 4.16 m/s    MV stenosis pressure 1/2 time 43.11 ms    MV valve area p 1/2 method 5.10 cm2    Triscuspid Valve Regurgitation Peak Gradient 15 mmHg    PV mean gradient 1 mmHg    RVOT peak jonh 0.66 m/s    Ao root annulus 3.25 cm    STJ 3.53 cm    Ascending aorta 3.39 cm    IVC diameter 1.48 cm    Mean e' 0.11 m/s    ZLVIDS -0.18     ZLVIDD -1.11     LA Volume Index 25.5 mL/m2    LA volume 48.44 cm3    LA WIDTH 3.4 cm    RA Width 2.7 cm    TV resting pulmonary artery pressure 18 mmHg    RV TB RVSP 5 mmHg    Est. RA pres 3 mmHg    Narrative      Left Ventricle: The left ventricle is normal in size. Normal wall   thickness. There is concentric remodeling. Normal wall motion. There is   normal systolic function  with a visually estimated ejection fraction of 60   - 65%. There is normal diastolic function.    Right Ventricle: Normal right ventricular cavity size. Wall thickness   is normal. Right ventricle wall motion  is normal. Systolic function is   normal.    Pulmonary Artery: The estimated pulmonary artery systolic pressure is   18 mmHg.    IVC/SVC: Normal venous pressure at 3 mmHg.

## 2023-10-29 NOTE — PROGRESS NOTES
[facility] ([facility])  Cardiology  Progress Note    Patient Name: Solomon Joseph  MRN: [id number]  Admission Date: 10/28/2023  Hospital Length of Stay: 0 days  Code Status: Full Code   Attending Physician: Norbert Avila MD   Primary Care Physician: Tunde Feliz MD  Expected Discharge Date: 10/29/2023  Principal Problem:Chest pain    Subjective:     Hospital Course:   10/29/23-Patient stable, no further changes,  Plan discharge and evaluate GI causes of intermittent pain, no exertional features, Normal echo and EKG,.  Prior stress test was normal.      Interval History: no symptoms    Review of Systems   Constitutional: Negative for chills, diaphoresis, night sweats, weight gain and weight loss.   HENT:  Negative for congestion, hoarse voice, sore throat and stridor.    Eyes:  Negative for double vision and pain.   Cardiovascular:  Negative for chest pain, claudication, cyanosis, dyspnea on exertion, irregular heartbeat, leg swelling, near-syncope, orthopnea, palpitations, paroxysmal nocturnal dyspnea and syncope.   Respiratory:  Negative for cough, hemoptysis, shortness of breath, sleep disturbances due to breathing, snoring, sputum production and wheezing.    Endocrine: Negative for cold intolerance, heat intolerance and polydipsia.   Hematologic/Lymphatic: Negative for bleeding problem. Does not bruise/bleed easily.   Skin:  Negative for color change, dry skin and rash.   Musculoskeletal:  Negative for joint swelling and muscle cramps.   Gastrointestinal:  Negative for bloating, abdominal pain, constipation, diarrhea, dysphagia, melena, nausea and vomiting.   Genitourinary:  Negative for flank pain and urgency.   Neurological:  Negative for dizziness, focal weakness, headaches, light-headedness, loss of balance, seizures and weakness.   Psychiatric/Behavioral:  Negative for altered mental status and memory loss. The patient is not nervous/anxious.      Objective:     Vital Signs (Most Recent):  Temp:  98 °F (36.7 °C) (10/29/23 0713)  Pulse: 61 (10/29/23 0713)  Resp: 14 (10/29/23 0713)  BP: 129/74 (10/29/23 0810)  SpO2: 95 % (10/29/23 0713) Vital Signs (24h Range):  Temp:  [97.5 °F (36.4 °C)-98 °F (36.7 °C)] 98 °F (36.7 °C)  Pulse:  [52-70] 61  Resp:  [14-18] 14  SpO2:  [94 %-97 %] 95 %  BP: (118-136)/(66-87) 129/74     Weight: 78.5 kg (173 lb)  Body mass index is 27.1 kg/m².     SpO2: 95 %       No intake or output data in the 24 hours ending 10/29/23 1215    Lines/Drains/Airways       Peripheral Intravenous Line  Duration                  Peripheral IV - Single Lumen 10/28/23 0837 18 G Anterior;Proximal;Right Forearm 1 day                       Physical Exam  Eyes:      Pupils: Pupils are equal, round, and reactive to light.   Neck:      Trachea: No tracheal deviation.   Cardiovascular:      Rate and Rhythm: Normal rate and regular rhythm.      Pulses: Intact distal pulses.           Carotid pulses are 2+ on the right side and 2+ on the left side.       Radial pulses are 2+ on the right side and 2+ on the left side.        Femoral pulses are 2+ on the right side and 2+ on the left side.       Popliteal pulses are 2+ on the right side and 2+ on the left side.        Dorsalis pedis pulses are 2+ on the right side and 2+ on the left side.        Posterior tibial pulses are 2+ on the right side and 2+ on the left side.      Heart sounds: Normal heart sounds. No murmur heard.     No friction rub. No gallop.   Pulmonary:      Effort: Pulmonary effort is normal. No respiratory distress.      Breath sounds: Normal breath sounds. No stridor. No wheezing or rales.   Chest:      Chest wall: No tenderness.   Abdominal:      General: There is no distension.      Tenderness: There is no abdominal tenderness. There is no rebound.   Musculoskeletal:         General: No tenderness.      Cervical back: Normal range of motion.   Skin:     General: Skin is warm and dry.   Neurological:      Mental Status: He is alert and oriented  to person, place, and time.            Significant Labs: CBC   Recent Labs   Lab 10/28/23  0834 10/29/23  0431   WBC 6.84 6.61   HGB 16.0 15.7   HCT 45.5 45.5    195    and Troponin   Recent Labs   Lab 10/28/23  0834 10/28/23  1229 10/28/23  1637   TROPONINI 0.105* 0.120* 0.100*       Significant Imaging: Echocardiogram: Transthoracic echo (TTE) complete ([facility] Only):   Results for orders placed or performed during the hospital encounter of 10/28/23   Echo   Result Value Ref Range    BSA 1.93 m2    LVOT stroke volume 57.12 cm3    LVIDd 4.77 3.5 - 6.0 cm    LV Systolic Volume 41.32 mL    LV Systolic Volume Index 21.7 mL/m2    LVIDs 3.21 2.1 - 4.0 cm    LV Diastolic Volume 105.99 mL    LV Diastolic Volume Index 55.78 mL/m2    IVS 1.16 (A) 0.6 - 1.1 cm    LVOT diameter 2.10 cm    LVOT area 3.5 cm2    FS 33 28 - 44 %    Left Ventricle Relative Wall Thickness 0.43 cm    Posterior Wall 1.02 0.6 - 1.1 cm    LV mass 189.61 g    LV Mass Index 100 g/m2    MV Peak E Jonh 0.56 m/s    TDI LATERAL 0.13 m/s    TDI SEPTAL 0.08 m/s    E/E' ratio 5.33 m/s    MV Peak A Jonh 0.69 m/s    TR Max Jonh 1.95 m/s    E/A ratio 0.81     IVRT 72.31 msec    E wave deceleration time 148.64 msec    LV SEPTAL E/E' RATIO 7.00 m/s    LV LATERAL E/E' RATIO 4.31 m/s    LVOT peak jonh 0.79 m/s    Left Ventricular Outflow Tract Mean Velocity 0.65 cm/s    Left Ventricular Outflow Tract Mean Gradient 1.72 mmHg    LA size 3.57 cm    Left Atrium Minor Axis 4.72 cm    Left Atrium Major Axis 4.67 cm    RVOT peak VTI 14.9 cm    TAPSE 1.72 cm    RA Major Axis 3.65 cm    AV mean gradient 3 mmHg    AV peak gradient 4 mmHg    Ao peak john 0.98 m/s    Ao VTI 20.60 cm    LVOT peak VTI 16.50 cm    AV valve area 2.77 cm²    AV Velocity Ratio 0.81     AV index (prosthetic) 0.80     ROBYN by Velocity Ratio 2.79 cm²    Mr max jonh 4.16 m/s    MV stenosis pressure 1/2 time 43.11 ms    MV valve area p 1/2 method 5.10 cm2    Triscuspid Valve Regurgitation Peak Gradient 15  mmHg    PV mean gradient 1 mmHg    RVOT peak pa 0.66 m/s    Ao root annulus 3.25 cm    STJ 3.53 cm    Ascending aorta 3.39 cm    IVC diameter 1.48 cm    Mean e' 0.11 m/s    ZLVIDS -0.18     ZLVIDD -1.11     LA Volume Index 25.5 mL/m2    LA volume 48.44 cm3    LA WIDTH 3.4 cm    RA Width 2.7 cm    TV resting pulmonary artery pressure 18 mmHg    RV TB RVSP 5 mmHg    Est. RA pres 3 mmHg    Narrative      Left Ventricle: The left ventricle is normal in size. Normal wall   thickness. There is concentric remodeling. Normal wall motion. There is   normal systolic function with a visually estimated ejection fraction of 60   - 65%. There is normal diastolic function.    Right Ventricle: Normal right ventricular cavity size. Wall thickness   is normal. Right ventricle wall motion  is normal. Systolic function is   normal.    Pulmonary Artery: The estimated pulmonary artery systolic pressure is   18 mmHg.    IVC/SVC: Normal venous pressure at 3 mmHg.       Assessment and Plan:     Brief HPI: pain free, follow up to cardiology and GI    * Chest pain  Follow    10/29/23-plan follow up to cardiology and GI    CAD (coronary artery disease)  Trend troponin        VTE Risk Mitigation (From admission, onward)         Ordered     Place sequential compression device  Until discontinued         10/28/23 0930     IP VTE LOW RISK PATIENT  Once         10/28/23 0930                Skyler Rios MD  Cardiology  [facility] ([facility])

## 2023-10-29 NOTE — PLAN OF CARE
Discharge education discussed with pt and his wife. No questions. PIV and tele monitor removed. No distress or pain. Pt transported via wheelchair to private vehicle with wife.

## 2023-10-29 NOTE — DISCHARGE SUMMARY
[facility] ([facility])  [facility] Medicine  Discharge Summary      Patient Name: Solomon Joseph  MRN: [id number]  CLARISSE: [id number]  Patient Class: OP- Observation  Admission Date: 10/28/2023  Hospital Length of Stay: 0 days  Discharge Date and Time: No discharge date for patient encounter.  Attending Physician: Norbert Avila MD   Discharging Provider: Norbert Avila MD  Primary Care Provider: Tunde Feliz MD    Primary Care Team: Networked reference to record PCT     HPI:   Mr. Joseph is a 58 year old male with a history CHERISE (cpap is on order),  HLD, CAD s/p prior STEMI with PCI of proximal and mid LAD in 2012, s/p recent NSTEMI 4/4/23 with successful PCI of LCX, one episode of PAF who presents to the ED for eval for CP onset 2 weeks ago, states it is chest pressure mild, intermittent, no exacerbating or relieving factors.  Patient states at first he thought it was gas pain d/t his constipation.  Last night he reports mid sternal chest pain and numbness in both arm.  Patient reports compliance with mediations.  IN the ED, EKG NSR, Lab work unremarkable except for Trop 0.1.  UA with no signs of infection, Chest xray with no acute processes.  Currently chest pain free.  Patient will be admitted to observation for chest pain.  Case discussed with cardiology, no hep gtt at this time, will trend troponin and monitor.     Code Status Full  Surrogate decision maker Tia      * No surgery found *      Hospital Course:   10/29/23  NAEON. Troponin trend flat (0.1, 0.12, 0.1).  TTE with LVEF 60-65%, normal RV function, no WMA noted.  Cardiology following, recommended outpatient exercise stress test, scheduled for this Thursday, 11/2/23    Patient reports discomfort in middle of chest, non-radiating, reports worse with laying down, exertion including lifting weights.   Reports pain currently, improved since admission.  Discussed other possible etiologies of pain including GERD/reflux.  Protonix trial on  discharge.  Stable for discharge home.       Goals of Care Treatment Preferences:  Code Status: Full Code      Consults:   Consults (From admission, onward)        Status Ordering Provider     Inpatient consult to Cardiology  Once        Provider:  Skylre Rios MD    Completed HANNAH GORDON          No new Assessment & Plan notes have been filed under this hospital service since the last note was generated.  Service: Hospital Medicine    Final Active Diagnoses:    Diagnosis Date Noted POA    PRINCIPAL PROBLEM:  Chest pain [R07.9] 04/04/2023 Yes    CHERISE (obstructive sleep apnea) [G47.33] 07/07/2023 Yes    Elevated troponin [R79.89] 04/04/2023 Yes    CAD (coronary artery disease) [I25.10] 04/04/2023 Yes    Paroxysmal atrial fibrillation [I48.0] 04/30/2018 Yes      Problems Resolved During this Admission:       Discharged Condition: stable    Disposition: Home or Self Care    Follow Up:   Follow-up Information     Glen Lemus MD Follow up on 11/2/2023.    Specialties: Interventional Cardiology, Cardiology  Why: Hospital discharge follow up  Contact information:  [address]  [place] [address] [phone]             Tunde Feliz MD. Schedule an appointment as soon as possible for a visit in 2 week(s).    Specialty: Family Medicine  Why: Hospital discharge follow up  Contact information:  [address] [facility] [address] [phone]                       Patient Instructions:      Diet Cardiac     Activity as tolerated       Significant Diagnostic Studies: Labs:   Troponin   Recent Labs   Lab 10/28/23  0834 10/28/23  1229 10/28/23  1637   TROPONINI 0.105* 0.120* 0.100*    and All labs within the past 24 hours have been reviewed    Pending Diagnostic Studies:     None         Medications:  Reconciled Home Medications:      Medication List      START taking these medications    pantoprazole 40 MG tablet  Commonly known as: PROTONIX  Take 1 tablet (40 mg total) by  mouth once daily.        CONTINUE taking these medications    aspirin 81 MG Chew  Take 81 mg by mouth once daily.     atorvastatin 80 MG tablet  Commonly known as: LIPITOR  Take 0.5 tablets (40 mg total) by mouth once daily.     metoprolol tartrate 25 MG tablet  Commonly known as: LOPRESSOR  Take 1 tablet (25 mg total) by mouth 2 (two) times daily.     saw palmetto 500 MG capsule  Take 500 mg by mouth once daily.     ticagrelor 90 mg tablet  Commonly known as: BRILINTA  Take 1 tablet (90 mg total) by mouth 2 (two) times daily.            Indwelling Lines/Drains at time of discharge:   Lines/Drains/Airways     None                 Time spent on the discharge of patient: 20 minutes         Norbert Avila MD  Department of Hospital Medicine  [facility] ([facility])   Yes

## 2023-10-29 NOTE — HOSPITAL COURSE
10/29/23  NAEON. Troponin trend flat (0.1, 0.12, 0.1).  TTE with LVEF 60-65%, normal RV function, no WMA noted.  Cardiology following, recommended outpatient exercise stress test, scheduled for this Thursday, 11/2/23    Patient reports discomfort in middle of chest, non-radiating, reports worse with laying down, exertion including lifting weights.   Reports pain currently, improved since admission.  Discussed other possible etiologies of pain including GERD/reflux.  Protonix trial on discharge.  Stable for discharge home.

## 2023-11-02 ENCOUNTER — OFFICE VISIT (OUTPATIENT)
Dept: CARDIOLOGY | Facility: CLINIC | Age: 58
End: 2023-11-02
Payer: COMMERCIAL

## 2023-11-02 VITALS
HEART RATE: 65 BPM | SYSTOLIC BLOOD PRESSURE: 130 MMHG | BODY MASS INDEX: 27.71 KG/M2 | OXYGEN SATURATION: 98 % | HEIGHT: 67 IN | WEIGHT: 176.56 LBS | DIASTOLIC BLOOD PRESSURE: 84 MMHG

## 2023-11-02 DIAGNOSIS — R06.83 SNORING: ICD-10-CM

## 2023-11-02 DIAGNOSIS — R53.83 FATIGUE, UNSPECIFIED TYPE: ICD-10-CM

## 2023-11-02 DIAGNOSIS — I48.0 PAROXYSMAL ATRIAL FIBRILLATION: ICD-10-CM

## 2023-11-02 DIAGNOSIS — I25.2 HISTORY OF NON-ST ELEVATION MYOCARDIAL INFARCTION (NSTEMI): ICD-10-CM

## 2023-11-02 DIAGNOSIS — G47.33 OSA (OBSTRUCTIVE SLEEP APNEA): ICD-10-CM

## 2023-11-02 DIAGNOSIS — R79.89 ELEVATED TROPONIN: ICD-10-CM

## 2023-11-02 DIAGNOSIS — Z01.818 PRE-OP EVALUATION: ICD-10-CM

## 2023-11-02 DIAGNOSIS — I25.110 CORONARY ARTERY DISEASE INVOLVING NATIVE CORONARY ARTERY OF NATIVE HEART WITH UNSTABLE ANGINA PECTORIS: ICD-10-CM

## 2023-11-02 DIAGNOSIS — I20.0 ACCELERATING ANGINA: Primary | ICD-10-CM

## 2023-11-02 PROCEDURE — 3008F PR BODY MASS INDEX (BMI) DOCUMENTED: ICD-10-PCS | Mod: CPTII,S$GLB,, | Performed by: PHYSICIAN ASSISTANT

## 2023-11-02 PROCEDURE — 93010 ELECTROCARDIOGRAM REPORT: CPT | Mod: ,,, | Performed by: STUDENT IN AN ORGANIZED HEALTH CARE EDUCATION/TRAINING PROGRAM

## 2023-11-02 PROCEDURE — 1159F MED LIST DOCD IN RCRD: CPT | Mod: CPTII,S$GLB,, | Performed by: PHYSICIAN ASSISTANT

## 2023-11-02 PROCEDURE — 99999 PR PBB SHADOW E&M-EST. PATIENT-LVL IV: CPT | Mod: PBBFAC,,, | Performed by: PHYSICIAN ASSISTANT

## 2023-11-02 PROCEDURE — 3075F PR MOST RECENT SYSTOLIC BLOOD PRESS GE 130-139MM HG: ICD-10-PCS | Mod: CPTII,S$GLB,, | Performed by: PHYSICIAN ASSISTANT

## 2023-11-02 PROCEDURE — 99215 OFFICE O/P EST HI 40 MIN: CPT | Mod: S$GLB,,, | Performed by: PHYSICIAN ASSISTANT

## 2023-11-02 PROCEDURE — 99215 PR OFFICE/OUTPT VISIT, EST, LEVL V, 40-54 MIN: ICD-10-PCS | Mod: S$GLB,,, | Performed by: PHYSICIAN ASSISTANT

## 2023-11-02 PROCEDURE — 3044F PR MOST RECENT HEMOGLOBIN A1C LEVEL <7.0%: ICD-10-PCS | Mod: CPTII,S$GLB,, | Performed by: PHYSICIAN ASSISTANT

## 2023-11-02 PROCEDURE — 4010F ACE/ARB THERAPY RXD/TAKEN: CPT | Mod: CPTII,S$GLB,, | Performed by: PHYSICIAN ASSISTANT

## 2023-11-02 PROCEDURE — 1159F PR MEDICATION LIST DOCUMENTED IN MEDICAL RECORD: ICD-10-PCS | Mod: CPTII,S$GLB,, | Performed by: PHYSICIAN ASSISTANT

## 2023-11-02 PROCEDURE — 3079F DIAST BP 80-89 MM HG: CPT | Mod: CPTII,S$GLB,, | Performed by: PHYSICIAN ASSISTANT

## 2023-11-02 PROCEDURE — 3075F SYST BP GE 130 - 139MM HG: CPT | Mod: CPTII,S$GLB,, | Performed by: PHYSICIAN ASSISTANT

## 2023-11-02 PROCEDURE — 1160F PR REVIEW ALL MEDS BY PRESCRIBER/CLIN PHARMACIST DOCUMENTED: ICD-10-PCS | Mod: CPTII,S$GLB,, | Performed by: PHYSICIAN ASSISTANT

## 2023-11-02 PROCEDURE — 4010F PR ACE/ARB THEARPY RXD/TAKEN: ICD-10-PCS | Mod: CPTII,S$GLB,, | Performed by: PHYSICIAN ASSISTANT

## 2023-11-02 PROCEDURE — 99999 PR PBB SHADOW E&M-EST. PATIENT-LVL IV: ICD-10-PCS | Mod: PBBFAC,,, | Performed by: PHYSICIAN ASSISTANT

## 2023-11-02 PROCEDURE — 3008F BODY MASS INDEX DOCD: CPT | Mod: CPTII,S$GLB,, | Performed by: PHYSICIAN ASSISTANT

## 2023-11-02 PROCEDURE — 3079F PR MOST RECENT DIASTOLIC BLOOD PRESSURE 80-89 MM HG: ICD-10-PCS | Mod: CPTII,S$GLB,, | Performed by: PHYSICIAN ASSISTANT

## 2023-11-02 PROCEDURE — 1160F RVW MEDS BY RX/DR IN RCRD: CPT | Mod: CPTII,S$GLB,, | Performed by: PHYSICIAN ASSISTANT

## 2023-11-02 PROCEDURE — 93010 EKG 12-LEAD: ICD-10-PCS | Mod: ,,, | Performed by: STUDENT IN AN ORGANIZED HEALTH CARE EDUCATION/TRAINING PROGRAM

## 2023-11-02 PROCEDURE — 3044F HG A1C LEVEL LT 7.0%: CPT | Mod: CPTII,S$GLB,, | Performed by: PHYSICIAN ASSISTANT

## 2023-11-02 NOTE — PROGRESS NOTES
Subjective:   Patient ID:  Solomon Joseph is a 58 y.o. male who presents for follow-up of hospital follow-up    HPI  Mr. Joseph is a 58 year old male patient whose current medical conditions include CAD s/p prior STEMI with PCI of proximal and mid LAD in 2012, s/p recent NSTEMI 4/4/23 with successful PCI of LCX, one episode of PAF who presents today for hospital follow-up. Patient recently admitted to [facility] with burning CP. Troponin was mildly elevated but flat, EF normal on echo. Symptoms were attributed to possible GERD/gas and he was discharged home. He returns today and states he is doing ok. Still having CP symptoms, had recurrent exertional CP while walking on treadmill and had to stop and rest which is not normal for him. Denies any radiation of pain or any associated SOB, diaphoresis, nausea, or vomiting. Tried taking PPI which has been minimally effective, does get some relief with belching sensation from Tums. He is typically very active and concerned about these symptoms. He is compliant with his medications, on ASA and Briilnta. He started a new supplement called [employer] and is unsure if that played a role in his presentation/symptoms. EKG today in clinic normal.    Echo from admission showed normal EF. MPI stress test 5/23 negative    Review of Systems   Constitutional: Negative for chills, decreased appetite, fever and malaise/fatigue.   HENT:  Negative for congestion, hoarse voice and sore throat.    Eyes:  Negative for blurred vision and discharge.   Cardiovascular:  Positive for chest pain (exertional burning). Negative for claudication, cyanosis, dyspnea on exertion, irregular heartbeat, leg swelling, near-syncope, orthopnea, palpitations and paroxysmal nocturnal dyspnea.   Respiratory:  Negative for cough, hemoptysis, shortness of breath, snoring, sputum production and wheezing.    Endocrine: Negative for cold intolerance and heat intolerance.   Hematologic/Lymphatic: Negative for bleeding  "problem. Does not bruise/bleed easily.   Skin:  Negative for rash.   Musculoskeletal:  Negative for arthritis, back pain, joint pain, joint swelling, muscle cramps, muscle weakness and myalgias.   Gastrointestinal:  Positive for heartburn. Negative for abdominal pain, constipation, diarrhea, melena and nausea.   Genitourinary:  Negative for hematuria.   Neurological:  Negative for dizziness, focal weakness, headaches, light-headedness, loss of balance, numbness, paresthesias, seizures and weakness.   Psychiatric/Behavioral:  Negative for memory loss. The patient does not have insomnia.    Allergic/Immunologic: Negative for hives.     /84 (BP Location: Left arm, Patient Position: Sitting, BP Method: Large (Manual))   Pulse 65   Ht 5' 7" (1.702 m)   Wt 80.1 kg (176 lb 9.4 oz)   SpO2 98%   BMI 27.66 kg/m²     Objective:   Physical Exam  Vitals and nursing note reviewed.   Constitutional:       General: He is not in acute distress.     Appearance: Normal appearance. He is well-developed. He is not diaphoretic.   HENT:      Head: Normocephalic and atraumatic.   Eyes:      General:         Right eye: No discharge.         Left eye: No discharge.      Pupils: Pupils are equal, round, and reactive to light.   Cardiovascular:      Rate and Rhythm: Normal rate and regular rhythm.      Heart sounds: Normal heart sounds, S1 normal and S2 normal. No murmur heard.     No S4 sounds.   Pulmonary:      Effort: Pulmonary effort is normal. No respiratory distress.      Breath sounds: Normal breath sounds. No wheezing or rales.   Abdominal:      General: There is no distension.      Tenderness: There is no rebound.   Musculoskeletal:      Right lower leg: No edema.      Left lower leg: No edema.   Skin:     General: Skin is warm and dry.      Findings: No erythema.   Neurological:      Mental Status: He is alert and oriented to person, place, and time.   Psychiatric:         Mood and Affect: Mood normal.         Behavior: " Behavior normal.         Thought Content: Thought content normal.       Troponin from recent hospital admission  0105>0.120>0.100.    TTE Results  Summary         Left Ventricle: The left ventricle is normal in size. Normal wall thickness. There is concentric remodeling. Normal wall motion. There is normal systolic function with a visually estimated ejection fraction of 60 - 65%. There is normal diastolic function.    Right Ventricle: Normal right ventricular cavity size. Wall thickness is normal. Right ventricle wall motion  is normal. Systolic function is normal.    Pulmonary Artery: The estimated pulmonary artery systolic pressure is 18 mmHg.    IVC/SVC: Normal venous pressure at 3 mmHg.        Assessment:      1. Accelerating angina    2. Pre-op evaluation    3. Snoring    4. CHERISE (obstructive sleep apnea)    5. Fatigue, unspecified type    6. Paroxysmal atrial fibrillation    7. History of non-ST elevation myocardial infarction (NSTEMI)    8. Elevated troponin    9. Coronary artery disease involving native coronary artery of native heart with unstable angina pectoris      Patient presents for f/u. Had recent hospital visit with CP symptoms/elevated trop, attributed to gas/GERD and d/c'd home. HE is still having ongoing exertional burning CP, noticed on treadmill over the weekend. Given his continued symptoms, elevated troponin, and significant CV history, needs repeat LHC to reassess patency of stents/look for new blockages.   Plan:   -PT/PTT today, recent CBC and CMP stable/reviewed  -Continue current CV medications  -Cardiac low salt diet  -LHC tomorrow by Dr. Del Toro. All risks, benefits, and treatment alternatives explained to patient in detail. All questions answered. He has agreed to proceed. Consent signed and appropriately witnessed in office today.

## 2023-11-03 ENCOUNTER — HOSPITAL ENCOUNTER (OUTPATIENT)
Facility: HOSPITAL | Age: 58
Discharge: HOME | End: 2023-11-03
Attending: INTERNAL MEDICINE | Admitting: INTERNAL MEDICINE
Payer: COMMERCIAL

## 2023-11-03 VITALS
HEART RATE: 58 BPM | HEIGHT: 67 IN | DIASTOLIC BLOOD PRESSURE: 79 MMHG | OXYGEN SATURATION: 97 % | BODY MASS INDEX: 27.47 KG/M2 | WEIGHT: 175 LBS | RESPIRATION RATE: 17 BRPM | TEMPERATURE: 98 F | SYSTOLIC BLOOD PRESSURE: 112 MMHG

## 2023-11-03 DIAGNOSIS — I25.2 HX OF NON-ST ELEVATION MYOCARDIAL INFARCTION (NSTEMI): ICD-10-CM

## 2023-11-03 DIAGNOSIS — I25.10 CAD IN NATIVE ARTERY: ICD-10-CM

## 2023-11-03 DIAGNOSIS — Q24.9 CARDIAC ABNORMALITY: ICD-10-CM

## 2023-11-03 DIAGNOSIS — I48.0 PAF (PAROXYSMAL ATRIAL FIBRILLATION): ICD-10-CM

## 2023-11-03 DIAGNOSIS — I20.0 ACCELERATING ANGINA: ICD-10-CM

## 2023-11-03 LAB
CATH EF QUANTITATIVE: 60 %
POC ACTIVATED CLOTTING TIME K: 245 SEC (ref 74–137)
SAMPLE: ABNORMAL

## 2023-11-03 PROCEDURE — 93010 EKG 12-LEAD: ICD-10-PCS | Mod: ,,, | Performed by: INTERNAL MEDICINE

## 2023-11-03 PROCEDURE — 99153 MOD SED SAME PHYS/QHP EA: CPT | Performed by: INTERNAL MEDICINE

## 2023-11-03 PROCEDURE — 93005 ELECTROCARDIOGRAM TRACING: CPT

## 2023-11-03 PROCEDURE — C1725 CATH, TRANSLUMIN NON-LASER: HCPCS | Performed by: INTERNAL MEDICINE

## 2023-11-03 PROCEDURE — C1760 CLOSURE DEV, VASC: HCPCS | Performed by: INTERNAL MEDICINE

## 2023-11-03 PROCEDURE — 99152 MOD SED SAME PHYS/QHP 5/>YRS: CPT | Mod: ,,, | Performed by: INTERNAL MEDICINE

## 2023-11-03 PROCEDURE — C9600 PERC DRUG-EL COR STENT SING: HCPCS | Mod: LD | Performed by: INTERNAL MEDICINE

## 2023-11-03 PROCEDURE — 92928 PRQ TCAT PLMT NTRAC ST 1 LES: CPT | Mod: LD,,, | Performed by: INTERNAL MEDICINE

## 2023-11-03 PROCEDURE — 92928 PR STENT: ICD-10-PCS | Mod: LD,,, | Performed by: INTERNAL MEDICINE

## 2023-11-03 PROCEDURE — 63600175 PHARM REV CODE 636 W HCPCS: Performed by: INTERNAL MEDICINE

## 2023-11-03 PROCEDURE — 99152 MOD SED SAME PHYS/QHP 5/>YRS: CPT | Performed by: INTERNAL MEDICINE

## 2023-11-03 PROCEDURE — 25000003 PHARM REV CODE 250: Performed by: INTERNAL MEDICINE

## 2023-11-03 PROCEDURE — C1874 STENT, COATED/COV W/DEL SYS: HCPCS | Performed by: INTERNAL MEDICINE

## 2023-11-03 PROCEDURE — 93010 ELECTROCARDIOGRAM REPORT: CPT | Mod: ,,, | Performed by: INTERNAL MEDICINE

## 2023-11-03 PROCEDURE — 27201423 OPTIME MED/SURG SUP & DEVICES STERILE SUPPLY: Performed by: INTERNAL MEDICINE

## 2023-11-03 PROCEDURE — 93458 L HRT ARTERY/VENTRICLE ANGIO: CPT | Mod: 26,59,51, | Performed by: INTERNAL MEDICINE

## 2023-11-03 PROCEDURE — C1887 CATHETER, GUIDING: HCPCS | Performed by: INTERNAL MEDICINE

## 2023-11-03 PROCEDURE — C1769 GUIDE WIRE: HCPCS | Performed by: INTERNAL MEDICINE

## 2023-11-03 PROCEDURE — C1894 INTRO/SHEATH, NON-LASER: HCPCS | Performed by: INTERNAL MEDICINE

## 2023-11-03 PROCEDURE — 25500020 PHARM REV CODE 255: Performed by: INTERNAL MEDICINE

## 2023-11-03 PROCEDURE — 93458 PR CATH PLACE/CORON ANGIO, IMG SUPER/INTERP,W LEFT HEART VENTRICULOGRAPHY: ICD-10-PCS | Mod: 26,59,51, | Performed by: INTERNAL MEDICINE

## 2023-11-03 PROCEDURE — 85347 COAGULATION TIME ACTIVATED: CPT | Performed by: INTERNAL MEDICINE

## 2023-11-03 PROCEDURE — 93458 L HRT ARTERY/VENTRICLE ANGIO: CPT | Performed by: INTERNAL MEDICINE

## 2023-11-03 PROCEDURE — 99152 PR MOD CONSCIOUS SEDATION, SAME PHYS, 5+ YRS, FIRST 15 MIN: ICD-10-PCS | Mod: ,,, | Performed by: INTERNAL MEDICINE

## 2023-11-03 DEVICE — EVEROLIMUS-ELUTING PLATINUM CHROMIUM CORONARY STENT SYSTEM
Type: IMPLANTABLE DEVICE | Site: CORONARY | Status: FUNCTIONAL
Brand: SYNERGY™ XD

## 2023-11-03 RX ORDER — SODIUM CHLORIDE 0.9 % (FLUSH) 0.9 %
10 SYRINGE (ML) INJECTION
Status: DISCONTINUED | OUTPATIENT
Start: 2023-11-03 | End: 2023-11-03 | Stop reason: HOSPADM

## 2023-11-03 RX ORDER — HYDROCODONE BITARTRATE AND ACETAMINOPHEN 5; 325 MG/1; MG/1
1 TABLET ORAL EVERY 4 HOURS PRN
Status: DISCONTINUED | OUTPATIENT
Start: 2023-11-03 | End: 2023-11-03 | Stop reason: HOSPADM

## 2023-11-03 RX ORDER — SODIUM CHLORIDE 9 MG/ML
INJECTION, SOLUTION INTRAVENOUS CONTINUOUS
Status: ACTIVE | OUTPATIENT
Start: 2023-11-03 | End: 2023-11-03

## 2023-11-03 RX ORDER — ACETAMINOPHEN 325 MG/1
650 TABLET ORAL EVERY 4 HOURS PRN
Status: DISCONTINUED | OUTPATIENT
Start: 2023-11-03 | End: 2023-11-03 | Stop reason: HOSPADM

## 2023-11-03 RX ORDER — FENTANYL CITRATE 50 UG/ML
INJECTION, SOLUTION INTRAMUSCULAR; INTRAVENOUS
Status: DISCONTINUED | OUTPATIENT
Start: 2023-11-03 | End: 2023-11-03 | Stop reason: HOSPADM

## 2023-11-03 RX ORDER — NITROGLYCERIN 0.4 MG/1
0.4 TABLET SUBLINGUAL EVERY 5 MIN PRN
Status: DISCONTINUED | OUTPATIENT
Start: 2023-11-03 | End: 2023-11-03 | Stop reason: HOSPADM

## 2023-11-03 RX ORDER — OXYCODONE HYDROCHLORIDE 5 MG/1
10 TABLET ORAL EVERY 4 HOURS PRN
Status: DISCONTINUED | OUTPATIENT
Start: 2023-11-03 | End: 2023-11-03 | Stop reason: HOSPADM

## 2023-11-03 RX ORDER — MIDAZOLAM HYDROCHLORIDE 1 MG/ML
INJECTION, SOLUTION INTRAMUSCULAR; INTRAVENOUS
Status: DISCONTINUED | OUTPATIENT
Start: 2023-11-03 | End: 2023-11-03 | Stop reason: HOSPADM

## 2023-11-03 RX ORDER — ONDANSETRON 8 MG/1
8 TABLET, ORALLY DISINTEGRATING ORAL EVERY 8 HOURS PRN
Status: DISCONTINUED | OUTPATIENT
Start: 2023-11-03 | End: 2023-11-03 | Stop reason: HOSPADM

## 2023-11-03 RX ORDER — DIPHENHYDRAMINE HCL 50 MG
50 CAPSULE ORAL ONCE
Status: COMPLETED | OUTPATIENT
Start: 2023-11-03 | End: 2023-11-03

## 2023-11-03 RX ORDER — PHENYLEPHRINE HYDROCHLORIDE 10 MG/ML
INJECTION INTRAVENOUS
Status: DISCONTINUED | OUTPATIENT
Start: 2023-11-03 | End: 2023-11-03 | Stop reason: HOSPADM

## 2023-11-03 RX ORDER — ATROPINE SULFATE 0.1 MG/ML
0.5 INJECTION INTRAVENOUS
Status: DISCONTINUED | OUTPATIENT
Start: 2023-11-03 | End: 2023-11-03 | Stop reason: HOSPADM

## 2023-11-03 RX ORDER — HEPARIN SODIUM 1000 [USP'U]/ML
INJECTION INTRAVENOUS; SUBCUTANEOUS
Status: DISCONTINUED | OUTPATIENT
Start: 2023-11-03 | End: 2023-11-03 | Stop reason: HOSPADM

## 2023-11-03 RX ORDER — SODIUM CHLORIDE 9 MG/ML
INJECTION, SOLUTION INTRAVENOUS CONTINUOUS
Status: DISCONTINUED | OUTPATIENT
Start: 2023-11-03 | End: 2023-11-03 | Stop reason: HOSPADM

## 2023-11-03 RX ORDER — LIDOCAINE HYDROCHLORIDE 20 MG/ML
INJECTION, SOLUTION EPIDURAL; INFILTRATION; INTRACAUDAL; PERINEURAL
Status: DISCONTINUED | OUTPATIENT
Start: 2023-11-03 | End: 2023-11-03 | Stop reason: HOSPADM

## 2023-11-03 RX ADMIN — SODIUM CHLORIDE: 9 INJECTION, SOLUTION INTRAVENOUS at 08:11

## 2023-11-03 RX ADMIN — DIPHENHYDRAMINE HYDROCHLORIDE 50 MG: 50 CAPSULE ORAL at 08:11

## 2023-11-03 RX ADMIN — SODIUM CHLORIDE: 9 INJECTION, SOLUTION INTRAVENOUS at 10:11

## 2023-11-03 NOTE — NURSING
Ambulated to bathroom without difficulty  A & O X 3  Dsg remains c,d,I to r groin.  DC inst reviewed w/patient and wife.  Verbalized understanding.  IV site benign after cath tip dc'd intact.

## 2023-11-03 NOTE — Clinical Note
The catheter was inserted into the and was inserted over the wire into the ostium   left anterior descending.

## 2023-11-03 NOTE — H&P
HPI  Mr. Joseph is a 58 year old male patient whose current medical conditions include CAD s/p prior STEMI with PCI of proximal and mid LAD in 2012, s/p recent NSTEMI 4/4/23 with successful PCI of LCX, one episode of PAF who presents today for hospital follow-up. Patient recently admitted to [facility] with burning CP. Troponin was mildly elevated but flat, EF normal on echo. Symptoms were attributed to possible GERD/gas and he was discharged home. He returns today and states he is doing ok. Still having CP symptoms, had recurrent exertional CP while walking on treadmill and had to stop and rest which is not normal for him. Denies any radiation of pain or any associated SOB, diaphoresis, nausea, or vomiting. Tried taking PPI which has been minimally effective, does get some relief with belching sensation from Tums. He is typically very active and concerned about these symptoms. He is compliant with his medications, on ASA and Briilnta. He started a new supplement called [employer] and is unsure if that played a role in his presentation/symptoms. EKG today in clinic normal.     Echo from admission showed normal EF. MPI stress test 5/23 negative     Review of Systems   Constitutional: Negative for chills, decreased appetite, fever and malaise/fatigue.   HENT:  Negative for congestion, hoarse voice and sore throat.    Eyes:  Negative for blurred vision and discharge.   Cardiovascular:  Positive for chest pain (exertional burning). Negative for claudication, cyanosis, dyspnea on exertion, irregular heartbeat, leg swelling, near-syncope, orthopnea, palpitations and paroxysmal nocturnal dyspnea.   Respiratory:  Negative for cough, hemoptysis, shortness of breath, snoring, sputum production and wheezing.    Endocrine: Negative for cold intolerance and heat intolerance.   Hematologic/Lymphatic: Negative for bleeding problem. Does not bruise/bleed easily.   Skin:  Negative for rash.   Musculoskeletal:  Negative for arthritis,  "back pain, joint pain, joint swelling, muscle cramps, muscle weakness and myalgias.   Gastrointestinal:  Positive for heartburn. Negative for abdominal pain, constipation, diarrhea, melena and nausea.   Genitourinary:  Negative for hematuria.   Neurological:  Negative for dizziness, focal weakness, headaches, light-headedness, loss of balance, numbness, paresthesias, seizures and weakness.   Psychiatric/Behavioral:  Negative for memory loss. The patient does not have insomnia.    Allergic/Immunologic: Negative for hives.      /84 (BP Location: Left arm, Patient Position: Sitting, BP Method: Large (Manual))   Pulse 65   Ht 5' 7" (1.702 m)   Wt 80.1 kg (176 lb 9.4 oz)   SpO2 98%   BMI 27.66 kg/m²      Objective:   Physical Exam  Vitals and nursing note reviewed.   Constitutional:       General: He is not in acute distress.     Appearance: Normal appearance. He is well-developed. He is not diaphoretic.   HENT:      Head: Normocephalic and atraumatic.   Eyes:      General:         Right eye: No discharge.         Left eye: No discharge.      Pupils: Pupils are equal, round, and reactive to light.   Cardiovascular:      Rate and Rhythm: Normal rate and regular rhythm.      Heart sounds: Normal heart sounds, S1 normal and S2 normal. No murmur heard.     No S4 sounds.   Pulmonary:      Effort: Pulmonary effort is normal. No respiratory distress.      Breath sounds: Normal breath sounds. No wheezing or rales.   Abdominal:      General: There is no distension.      Tenderness: There is no rebound.   Musculoskeletal:      Right lower leg: No edema.      Left lower leg: No edema.   Skin:     General: Skin is warm and dry.      Findings: No erythema.   Neurological:      Mental Status: He is alert and oriented to person, place, and time.   Psychiatric:         Mood and Affect: Mood normal.         Behavior: Behavior normal.         Thought Content: Thought content normal.         Troponin from recent hospital " admission  0105>0.120>0.100.     TTE Results  Summary          Left Ventricle: The left ventricle is normal in size. Normal wall thickness. There is concentric remodeling. Normal wall motion. There is normal systolic function with a visually estimated ejection fraction of 60 - 65%. There is normal diastolic function.    Right Ventricle: Normal right ventricular cavity size. Wall thickness is normal. Right ventricle wall motion  is normal. Systolic function is normal.    Pulmonary Artery: The estimated pulmonary artery systolic pressure is 18 mmHg.    IVC/SVC: Normal venous pressure at 3 mmHg.           Assessment:      1. Accelerating angina    2. Pre-op evaluation    3. Snoring    4. CHERISE (obstructive sleep apnea)    5. Fatigue, unspecified type    6. Paroxysmal atrial fibrillation    7. History of non-ST elevation myocardial infarction (NSTEMI)    8. Elevated troponin    9. Coronary artery disease involving native coronary artery of native heart with unstable angina pectoris       Patient presents for f/u. Had recent hospital visit with CP symptoms/elevated trop, attributed to gas/GERD and d/c'd home. HE is still having ongoing exertional burning CP, noticed on treadmill over the weekend. Given his continued symptoms, elevated troponin, and significant CV history, needs repeat LHC to reassess patency of stents/look for new blockages.   Plan:   -PT/PTT today, recent CBC and CMP stable/reviewed  -Continue current CV medications  -Cardiac low salt diet  -LHC tomorrow by Dr. Del Toro. All risks, benefits, and treatment alternatives explained to patient in detail. All questions answered. He has agreed to proceed. Consent signed and appropriately witnessed in office today.

## 2023-11-03 NOTE — PLAN OF CARE
Discharge instructions, medications and safety concerns rev'd w/ pt and spouse, VSS. Pt ambulatory in CVRU w/o complications or complaints. Right Groin site WDL, site w/o hematoma or bleeding, dressing is CDI.    IV removed, catheter intact, and dressing applied. Stressed importance of making and keeping all f/u appointments. Patient verbalized understanding and has no questions.  Pt wheeled to car. Right groin dressing dry, and intact with no signs of bleeding. No swelling or redness noted.

## 2023-11-03 NOTE — BRIEF OP NOTE
<[facility] - Cath Lab ([facility])  Cardiology Department  Operative Note    SUMMARY     Date of Procedure: 11/3/2023     Procedure: Procedure(s) (LRB):  Left heart cath (Left)  Percutaneous coronary intervention (N/A)  INSERTION, STENT, CORONARY ARTERY (N/A)  Placement of Closure Device (Right)     Surgeon(s) and Role:     * Bruno Del Toro MD - Primary    Assisting Surgeon: None    Pre-Operative Diagnosis: Accelerating angina [I20.0]  CAD in native artery [I25.10]  PAF (paroxysmal atrial fibrillation) [I48.0]  Hx of non-ST elevation myocardial infarction (NSTEMI) [I25.2]    Post-Operative Diagnosis: Post-Op Diagnosis Codes:     * Accelerating angina [I20.0]     * CAD in native artery [I25.10]     * PAF (paroxysmal atrial fibrillation) [I48.0]     * Hx of non-ST elevation myocardial infarction (NSTEMI) [I25.2]    Anesthesia: RN IV Sedation    Technical Procedures Used: LHC,Dx angina    Description of the Findings of the Procedure: Findings: instent restenosis 90%, PCI with 3 x20 BILL to 0%, NML EF    Significant Surgical Tasks Conducted by the Assistant(s), if Applicable: none    Complications: No    Estimated Blood Loss (EBL): < 50 cc           Implants:   Implant Name Type Inv. Item Serial No.  Lot No. LRB No. Used Action   STENT SYNERGY XD [id number] - [id number] stent coronary BILL - Balloon Exp STENT SYNERGY XD [id number]  [employer] [id number]  1 Implanted       Specimens:   Specimen (24h ago, onward)      None                    Condition: Good    Disposition: PACU - hemodynamically stable.    Attestation: I was present and scrubbed for the entire procedure.

## 2023-11-07 NOTE — DISCHARGE SUMMARY
[facility] - Cath Lab (Hospital)  Discharge Summary      Admit Date: 11/3/2023    Discharge Date and Time: 11/3/2023  2:15 PM    Attending Physician: No att. providers found     Reason for Admission: LHC, Dx: abnormal stress test    Procedures Performed: Procedure(s) (LRB):  Left heart cath (Left)  Percutaneous coronary intervention (N/A)  INSERTION, STENT, CORONARY ARTERY (N/A)  Placement of Closure Device (Right)    Hospital Course (synopsis of major diagnoses, care, treatment, and services provided during the course of the hospital stay): Date of Procedure: 11/3/2023      Procedure: Procedure(s) (LRB):  Left heart cath (Left)  Percutaneous coronary intervention (N/A)  INSERTION, STENT, CORONARY ARTERY (N/A)  Placement of Closure Device (Right)      Surgeon(s) and Role:     * Bruno Del Toro MD - Primary     Assisting Surgeon: None     Pre-Operative Diagnosis: Accelerating angina [I20.0]  CAD in native artery [I25.10]  PAF (paroxysmal atrial fibrillation) [I48.0]  Hx of non-ST elevation myocardial infarction (NSTEMI) [I25.2]     Post-Operative Diagnosis: Post-Op Diagnosis Codes:     * Accelerating angina [I20.0]     * CAD in native artery [I25.10]     * PAF (paroxysmal atrial fibrillation) [I48.0]     * Hx of non-ST elevation myocardial infarction (NSTEMI) [I25.2]     Anesthesia: RN IV Sedation     Technical Procedures Used: LHC,Dx angina     Description of the Findings of the Procedure: Findings: instent restenosis 90%, PCI with 3 x20 BILL to 0%, NML EF     Significant Surgical Tasks Conducted by the Assistant(s), if Applicable: none     Complications: No        Goals of Care Treatment Preferences:  Code Status: Full Code      Consults: none    Significant Diagnostic Studies: Labs: All labs within the past 24 hours have been reviewed    Final Diagnoses:    Principal Problem: <principal problem not specified>   Secondary Diagnoses: There are no hospital problems to display for this patient.     Discharged  Condition: good    Disposition: Home or Self Care    Follow Up/Patient Instructions:     Medications:  Reconciled Home Medications:      Medication List        Continue taking these medications      aspirin 81 MG Chew  Take 81 mg by mouth once daily.     atorvastatin 80 MG tablet  Commonly known as: LIPITOR  Take 0.5 tablets (40 mg total) by mouth once daily.     metoprolol tartrate 25 MG tablet  Commonly known as: LOPRESSOR  Take 1 tablet (25 mg total) by mouth 2 (two) times daily.     pantoprazole 40 MG tablet  Commonly known as: PROTONIX  Take 1 tablet (40 mg total) by mouth once daily.     saw palmetto 500 MG capsule  Take 500 mg by mouth once daily.     ticagrelor 90 mg tablet  Commonly known as: BRILINTA  Take 1 tablet (90 mg total) by mouth 2 (two) times daily.            No discharge procedures on file.   Follow-up Information       Ora Qureshi PA-C Follow up in 2 week(s).    Specialty: Cardiology  Why: For wound re-check  Contact information:  [address] [place] [address] [address] Suri [address] [address] [phone]                              none

## 2023-12-04 ENCOUNTER — TELEPHONE (OUTPATIENT)
Dept: PULMONOLOGY | Facility: CLINIC | Age: 58
End: 2023-12-04
Payer: COMMERCIAL

## 2023-12-04 NOTE — TELEPHONE ENCOUNTER
Returned patients call back. Informed patient he would have to call primary care doctor to get refill. Patient expressed understanding----- Message from Carline Vann sent at 12/4/2023 11:19 AM CST -----  Regarding: rx request  Contact: pt [phone]  Rx REFILL REQUEST    Refill or New Rx: new rx    RX Name and Strength: pantoprazole (PROTONIX) 40 MG tablet    Preferred Pharmacy with phone number:  [facility] [id number] - [place], [place] - [address] AT [facility] [address]  [address] [place] [address]  Phone: [phone] Fax: [phone]    Additional Information: pt was originally prescribed above med upon release from the hospital, w/ no refills. Please send new script to the pharmacy

## 2024-01-16 ENCOUNTER — TELEPHONE (OUTPATIENT)
Dept: PULMONOLOGY | Facility: CLINIC | Age: 59
End: 2024-01-16
Payer: COMMERCIAL

## 2024-03-04 DIAGNOSIS — I25.10 CAD IN NATIVE ARTERY: Primary | ICD-10-CM

## 2024-04-23 LAB — CRC RECOMMENDATION EXT: NORMAL

## 2024-05-28 ENCOUNTER — TELEPHONE (OUTPATIENT)
Dept: CARDIOLOGY | Facility: HOSPITAL | Age: 59
End: 2024-05-28
Payer: COMMERCIAL

## 2024-05-28 DIAGNOSIS — I48.0 PAROXYSMAL ATRIAL FIBRILLATION: ICD-10-CM

## 2024-05-28 RX ORDER — METOPROLOL TARTRATE 25 MG/1
25 TABLET, FILM COATED ORAL 2 TIMES DAILY
Qty: 60 TABLET | Refills: 3 | Status: SHIPPED | OUTPATIENT
Start: 2024-05-28

## 2024-05-28 NOTE — TELEPHONE ENCOUNTER
Please phone patient. He needs in office appt, he is overdue. Does he need refill of Brilinta as well.    Thanks

## 2024-06-03 DIAGNOSIS — I25.10 CORONARY ARTERY DISEASE, UNSPECIFIED VESSEL OR LESION TYPE, UNSPECIFIED WHETHER ANGINA PRESENT, UNSPECIFIED WHETHER NATIVE OR TRANSPLANTED HEART: ICD-10-CM

## 2024-06-03 RX ORDER — TICAGRELOR 90 MG/1
TABLET ORAL
Qty: 60 TABLET | Refills: 11 | Status: SHIPPED | OUTPATIENT
Start: 2024-06-03

## 2024-08-08 ENCOUNTER — OFFICE VISIT (OUTPATIENT)
Dept: CARDIOLOGY | Facility: CLINIC | Age: 59
End: 2024-08-08
Payer: COMMERCIAL

## 2024-08-08 VITALS
WEIGHT: 179.38 LBS | OXYGEN SATURATION: 98 % | SYSTOLIC BLOOD PRESSURE: 126 MMHG | HEART RATE: 80 BPM | BODY MASS INDEX: 28.09 KG/M2 | DIASTOLIC BLOOD PRESSURE: 80 MMHG

## 2024-08-08 DIAGNOSIS — I25.118 CORONARY ARTERY DISEASE OF NATIVE ARTERY OF NATIVE HEART WITH STABLE ANGINA PECTORIS: Primary | ICD-10-CM

## 2024-08-08 DIAGNOSIS — R07.9 CHEST PAIN, UNSPECIFIED TYPE: ICD-10-CM

## 2024-08-08 DIAGNOSIS — R00.2 PALPITATIONS: ICD-10-CM

## 2024-08-08 DIAGNOSIS — R06.83 SNORING: ICD-10-CM

## 2024-08-08 DIAGNOSIS — I25.2 HISTORY OF NON-ST ELEVATION MYOCARDIAL INFARCTION (NSTEMI): ICD-10-CM

## 2024-08-08 DIAGNOSIS — Z91.148 NONCOMPLIANCE WITH MEDICATION REGIMEN: ICD-10-CM

## 2024-08-08 DIAGNOSIS — I25.10 CAD IN NATIVE ARTERY: ICD-10-CM

## 2024-08-08 DIAGNOSIS — I48.0 PAROXYSMAL ATRIAL FIBRILLATION: ICD-10-CM

## 2024-08-08 DIAGNOSIS — R03.0 ELEVATED BLOOD PRESSURE READING: ICD-10-CM

## 2024-08-08 DIAGNOSIS — G47.33 OSA (OBSTRUCTIVE SLEEP APNEA): ICD-10-CM

## 2024-08-08 PROCEDURE — 1159F MED LIST DOCD IN RCRD: CPT | Mod: CPTII,S$GLB,, | Performed by: INTERNAL MEDICINE

## 2024-08-08 PROCEDURE — 93005 ELECTROCARDIOGRAM TRACING: CPT

## 2024-08-08 PROCEDURE — 3008F BODY MASS INDEX DOCD: CPT | Mod: CPTII,S$GLB,, | Performed by: INTERNAL MEDICINE

## 2024-08-08 PROCEDURE — 99999 PR PBB SHADOW E&M-EST. PATIENT-LVL III: CPT | Mod: PBBFAC,,, | Performed by: INTERNAL MEDICINE

## 2024-08-08 PROCEDURE — 93010 ELECTROCARDIOGRAM REPORT: CPT | Mod: ,,, | Performed by: STUDENT IN AN ORGANIZED HEALTH CARE EDUCATION/TRAINING PROGRAM

## 2024-08-08 PROCEDURE — 3074F SYST BP LT 130 MM HG: CPT | Mod: CPTII,S$GLB,, | Performed by: INTERNAL MEDICINE

## 2024-08-08 PROCEDURE — 99214 OFFICE O/P EST MOD 30 MIN: CPT | Mod: S$GLB,,, | Performed by: INTERNAL MEDICINE

## 2024-08-08 PROCEDURE — 3079F DIAST BP 80-89 MM HG: CPT | Mod: CPTII,S$GLB,, | Performed by: INTERNAL MEDICINE

## 2024-08-10 LAB
OHS QRS DURATION: 84 MS
OHS QTC CALCULATION: 433 MS

## 2024-08-20 ENCOUNTER — LAB VISIT (OUTPATIENT)
Dept: LAB | Facility: HOSPITAL | Age: 59
End: 2024-08-20
Attending: INTERNAL MEDICINE
Payer: COMMERCIAL

## 2024-08-20 DIAGNOSIS — I25.118 CORONARY ARTERY DISEASE OF NATIVE ARTERY OF NATIVE HEART WITH STABLE ANGINA PECTORIS: ICD-10-CM

## 2024-08-20 PROCEDURE — 80061 LIPID PANEL: CPT | Performed by: INTERNAL MEDICINE

## 2024-08-20 PROCEDURE — 80053 COMPREHEN METABOLIC PANEL: CPT | Performed by: INTERNAL MEDICINE

## 2024-08-20 PROCEDURE — 83036 HEMOGLOBIN GLYCOSYLATED A1C: CPT | Performed by: INTERNAL MEDICINE

## 2024-08-21 ENCOUNTER — TELEPHONE (OUTPATIENT)
Dept: CARDIOLOGY | Facility: CLINIC | Age: 59
End: 2024-08-21
Payer: COMMERCIAL

## 2024-08-21 LAB
ALBUMIN SERPL BCP-MCNC: 4.2 G/DL (ref 3.5–5.2)
ALP SERPL-CCNC: 65 U/L (ref 55–135)
ALT SERPL W/O P-5'-P-CCNC: 27 U/L (ref 10–44)
ANION GAP SERPL CALC-SCNC: 6 MMOL/L (ref 8–16)
AST SERPL-CCNC: 23 U/L (ref 10–40)
BILIRUB SERPL-MCNC: 0.6 MG/DL (ref 0.1–1)
BUN SERPL-MCNC: 17 MG/DL (ref 6–20)
CALCIUM SERPL-MCNC: 9.3 MG/DL (ref 8.7–10.5)
CHLORIDE SERPL-SCNC: 103 MMOL/L (ref 95–110)
CHOLEST SERPL-MCNC: 123 MG/DL (ref 120–199)
CHOLEST/HDLC SERPL: 2.9 {RATIO} (ref 2–5)
CO2 SERPL-SCNC: 29 MMOL/L (ref 23–29)
CREAT SERPL-MCNC: 0.9 MG/DL (ref 0.5–1.4)
EST. GFR  (NO RACE VARIABLE): >60 ML/MIN/1.73 M^2
ESTIMATED AVG GLUCOSE: 97 MG/DL (ref 68–131)
GLUCOSE SERPL-MCNC: 93 MG/DL (ref 70–110)
HBA1C MFR BLD: 5 % (ref 4–5.6)
HDLC SERPL-MCNC: 42 MG/DL (ref 40–75)
HDLC SERPL: 34.1 % (ref 20–50)
LDLC SERPL CALC-MCNC: 61.4 MG/DL (ref 63–159)
NONHDLC SERPL-MCNC: 81 MG/DL
POTASSIUM SERPL-SCNC: 4 MMOL/L (ref 3.5–5.1)
PROT SERPL-MCNC: 6.8 G/DL (ref 6–8.4)
SODIUM SERPL-SCNC: 138 MMOL/L (ref 136–145)
TRIGL SERPL-MCNC: 98 MG/DL (ref 30–150)

## 2024-08-21 NOTE — TELEPHONE ENCOUNTER
Called [phone] and went to voicemail.  the labs look good.         ----- Message from Glen Lemus MD sent at 8/21/2024 12:57 PM CDT -----  Labs look good

## 2024-09-13 DIAGNOSIS — I25.10 CORONARY ARTERY DISEASE, UNSPECIFIED VESSEL OR LESION TYPE, UNSPECIFIED WHETHER ANGINA PRESENT, UNSPECIFIED WHETHER NATIVE OR TRANSPLANTED HEART: ICD-10-CM

## 2024-09-13 RX ORDER — ATORVASTATIN CALCIUM 80 MG/1
TABLET, FILM COATED ORAL
Qty: 15 TABLET | Refills: 11 | Status: SHIPPED | OUTPATIENT
Start: 2024-09-13

## 2024-10-11 DIAGNOSIS — I48.0 PAROXYSMAL ATRIAL FIBRILLATION: ICD-10-CM

## 2024-10-11 RX ORDER — METOPROLOL TARTRATE 25 MG/1
25 TABLET, FILM COATED ORAL 2 TIMES DAILY
Qty: 60 TABLET | Refills: 3 | Status: SHIPPED | OUTPATIENT
Start: 2024-10-11

## 2024-10-16 ENCOUNTER — OFFICE VISIT (OUTPATIENT)
Dept: INTERNAL MEDICINE | Facility: CLINIC | Age: 59
End: 2024-10-16
Payer: COMMERCIAL

## 2024-10-16 VITALS
OXYGEN SATURATION: 98 % | HEART RATE: 64 BPM | BODY MASS INDEX: 27.25 KG/M2 | DIASTOLIC BLOOD PRESSURE: 86 MMHG | TEMPERATURE: 96 F | RESPIRATION RATE: 16 BRPM | WEIGHT: 174 LBS | SYSTOLIC BLOOD PRESSURE: 138 MMHG

## 2024-10-16 DIAGNOSIS — D17.9 LIPOMA, UNSPECIFIED SITE: ICD-10-CM

## 2024-10-16 DIAGNOSIS — Z79.899 ENCOUNTER FOR LONG-TERM (CURRENT) USE OF MEDICATIONS: Primary | ICD-10-CM

## 2024-10-16 DIAGNOSIS — N40.0 BENIGN PROSTATIC HYPERPLASIA, UNSPECIFIED WHETHER LOWER URINARY TRACT SYMPTOMS PRESENT: ICD-10-CM

## 2024-10-16 DIAGNOSIS — I25.10 CORONARY ARTERY DISEASE INVOLVING NATIVE CORONARY ARTERY OF NATIVE HEART WITHOUT ANGINA PECTORIS: ICD-10-CM

## 2024-10-16 DIAGNOSIS — G47.33 OSA (OBSTRUCTIVE SLEEP APNEA): ICD-10-CM

## 2024-10-16 DIAGNOSIS — Z12.5 SCREENING FOR PROSTATE CANCER: ICD-10-CM

## 2024-10-16 DIAGNOSIS — K21.9 GASTROESOPHAGEAL REFLUX DISEASE, UNSPECIFIED WHETHER ESOPHAGITIS PRESENT: ICD-10-CM

## 2024-10-16 PROCEDURE — 99999 PR PBB SHADOW E&M-EST. PATIENT-LVL III: CPT | Mod: PBBFAC,,, | Performed by: FAMILY MEDICINE

## 2024-10-16 RX ORDER — PANTOPRAZOLE SODIUM 40 MG/1
40 TABLET, DELAYED RELEASE ORAL DAILY
Qty: 90 TABLET | Refills: 1 | Status: SHIPPED | OUTPATIENT
Start: 2024-10-16 | End: 2024-11-15

## 2024-10-16 NOTE — PROGRESS NOTES
Subjective:       Patient ID: Solomon Joseph is a 59 y.o. male.    Chief Complaint: Gas    HPI    Patient Active Problem List   Diagnosis    Paroxysmal atrial fibrillation    Palpitations    Noncompliance with medication regimen    Chest pain    Elevated troponin    Elevated blood pressure reading    CAD (coronary artery disease)    History of non-ST elevation myocardial infarction (NSTEMI)    Snoring    Fatigue    CHERISE (obstructive sleep apnea)       Past Medical History:   Diagnosis Date    Coronary artery disease     Hypertension        Past Surgical History:   Procedure Laterality Date    CLOSURE DEVICE Right 11/3/2023    Procedure: Placement of Closure Device;  Surgeon: Bruno Del Toro MD;  Location: [facility] CATH LAB;  Service: Cardiology;  Laterality: Right;    CORONARY STENT PLACEMENT      CORONARY STENT PLACEMENT N/A 4/4/2023    Procedure: INSERTION, STENT, CORONARY ARTERY;  Surgeon: Glen Lemus MD;  Location: [facility] CATH LAB;  Service: Cardiology;  Laterality: N/A;    CORONARY STENT PLACEMENT N/A 11/3/2023    Procedure: INSERTION, STENT, CORONARY ARTERY;  Surgeon: Bruno Del Toro MD;  Location: [facility] CATH LAB;  Service: Cardiology;  Laterality: N/A;    INSTANTANEOUS WAVE-FREE RATIO (IFR) N/A 4/4/2023    Procedure: Instantaneous Wave-Free Ratio (IFR);  Surgeon: Glen Lemus MD;  Location: [facility] CATH LAB;  Service: Cardiology;  Laterality: N/A;    IVUS, CORONARY  4/4/2023    Procedure: IVUS, Coronary;  Surgeon: Glen Lemus MD;  Location: [facility] CATH LAB;  Service: Cardiology;;    LEFT HEART CATHETERIZATION Left 4/4/2023    Procedure: Left heart cath;  Surgeon: Glen Lemus MD;  Location: [facility] CATH LAB;  Service: Cardiology;  Laterality: Left;    LEFT HEART CATHETERIZATION Left 11/3/2023    Procedure: Left heart cath;  Surgeon: Bruno Del Toro MD;  Location: [facility] CATH LAB;  Service: Cardiology;  Laterality: Left;    PERCUTANEOUS CORONARY INTERVENTION, ARTERY N/A 4/4/2023    Procedure:  Percutaneous coronary intervention;  Surgeon: Glen Lemus MD;  Location: [facility] CATH LAB;  Service: Cardiology;  Laterality: N/A;    PERCUTANEOUS CORONARY INTERVENTION, ARTERY N/A 11/3/2023    Procedure: Percutaneous coronary intervention;  Surgeon: Bruno Del Toro MD;  Location: [facility] CATH LAB;  Service: Cardiology;  Laterality: N/A;       Family History   Problem Relation Name Age of Onset    Heart disease Father         Social History     Tobacco Use   Smoking Status Never   Smokeless Tobacco Never       Wt Readings from Last 5 Encounters:   10/16/24 78.9 kg (174 lb)   08/08/24 81.4 kg (179 lb 5.5 oz)   11/03/23 79.4 kg (175 lb)   11/02/23 80.1 kg (176 lb 9.4 oz)   10/28/23 78.5 kg (173 lb)       History of Present Illness    CHIEF COMPLAINT:  Patient presents today for evaluation of severe heartburn and chest pain.    CARDIOVASCULAR:  He has a history of coronary artery disease, paroxysmal atrial fibrillation, and a previous non-ST-elevation myocardial infarction (NSTEMI). He underwent a left heart catheterization with stent placement in past. He is currently tolerating exercise well and continuing his prescribed medications for cardiovascular management. He reports three emergency room visits in the past five years for suspected heart attacks, which were not confirmed, including one in [place] last year due to severe heartburn accompanied by anxiety, elevated heart rate, and increased blood pressure. He denies any recent cardiac events or hospitalizations since the April admission.    GASTROINTESTINAL:  He reports experiencing severe heartburn episodes, one instance so intense that he initially mistook it for a heart attack. He also complains of excessive gas and belching. He experienced abdominal pain for three days prior to the visit, but denies current abdominal pain on palpation. He denies any difficulty swallowing or food getting stuck.    RESPIRATORY:  He reports experiencing a dry cough every  morning for the past six months, describing one or two coughs upon waking, which clears his system for the rest of the day. He also mentions throat irritation and feeling congested. No other respiratory symptoms are reported.    GENITOURINARY:  He reports a history of nocturia, experiencing increased urinary frequency at night as he has aged, which he attributes to an enlarging prostate. He understands that prostate growth can compress the urethra, leading to incomplete bladder emptying and subsequent nighttime urination.    SEXUAL HEALTH:  He reports experiencing delayed ejaculation during sexual activity, sometimes not occurring at all. He expresses concern about the excessive delay, stating that it has become problematic during intimate encounters with his wife. He denies any desire for medication to address this issue.    MEDICAL HISTORY:  He has a history of obstructive sleep apnea but reports CPAP intolerance and is unable to use the device. He also has a history of lipomas and notes a new bump that appeared below his rib cage approximately 30 years ago, described as not painful and unchanged since its initial appearance.    MEDICATIONS:  He is currently taking aspirin, atorvastatin, Brilinta, and metoprolol for coronary artery disease. He reports previous use of Protonix for stomach acid reduction. He is using OTC supplements including saw palmetto and Total  T (OTC agent/supplement). He denies use of anabolic steroids.    LIFESTYLE AND SOCIAL HISTORY:  He reports regular weightlifting and is described as  muscular with significant muscle mass. He is sexually active with his wife, engaging in intercourse 3-4 times per week. He denies frequent masturbation due to the high frequency of sexual activity with his partner. He denies alcohol consumption, smoking, and nicotine use of any kind.            Objective:      Vitals:    10/16/24 1137   BP: 138/86   Pulse: 64   Resp: 16   Temp: 96.1 °F (35.6 °C)        Physical Exam  Constitutional:       General: He is not in acute distress.     Appearance: He is not ill-appearing.   Pulmonary:      Effort: Pulmonary effort is normal. No respiratory distress.   Neurological:      General: No focal deficit present.      Mental Status: He is alert.   Psychiatric:         Mood and Affect: Mood normal.         Behavior: Behavior normal.         Assessment:       1. Encounter for long-term (current) use of medications    2. Screening for prostate cancer    3. Benign prostatic hyperplasia, unspecified whether lower urinary tract symptoms present    4. Coronary artery disease involving native coronary artery of native heart without angina pectoris    5. CHERISE (obstructive sleep apnea)    6. Gastroesophageal reflux disease, unspecified whether esophagitis present        Plan:     - Assessed severe reflux symptoms, including heartburn, gas, and morning cough  - Considered possibility of GERD due to reported symptoms  - Evaluated lipoma on right flank, determined to be benign and not requiring immediate intervention -let us know if this changes (grows or hurts) and we will consult gen surg.  - Reviewed recent cardiac history, including stent placement and episodes of chest pain  - Considered potential causes for delayed ejaculation, ruling out medication side effects  - Assessed prostate health, noting likely BPH causing urinary symptoms    GASTROESOPHAGEAL REFLUX DISEASE (GERD):  - Explained GERD pathophysiology, including stomach acid traveling up the esophagus and potential symptoms.  - Discussed common triggers for GERD, including dietary factors and abdominal pressure.  - Patient to avoid eating past 6 PM.  - Patient to identify and avoid foods that trigger heartburn.  - Started Protonix (high dose) daily for at least 1 month to manage GERD symptoms.    BENIGN PROSTATIC HYPERPLASIA (BPH):  - Educated on BPH and its effects on urination.  - Explained prostate enlargement with age  and its potential impact on urinary function. Saw palmetto seems sufficient for now - consider flomax in future if need be.    ALLERGIC RHINITIS:  - Recommend considering an oral antihistamine before bed (e.g., Zyrtec, Allegra, Claritin, Xyzal) for morning congestion.    CORONARY ARTERY DISEASE:  - Continued aspirin for coronary artery disease.  - Continued atorvastatin for coronary artery disease.  - Continued Brilinta for coronary artery disease.  - Continued metoprolol for coronary artery disease.    LABS:  - Ordered CBC, CMP, A1c, lipid panel, PSA, and thyroid tests to be completed in 4 months.    LIPOMA:  - Contact the office if the lipoma starts to grow or becomes painful.    FOLLOW UP:  - Follow up in 4 months after completion of labs to review chronic issues and assess GERD        This note was verbally dictated, please excuse any type errors.

## 2024-10-21 ENCOUNTER — PATIENT OUTREACH (OUTPATIENT)
Dept: ADMINISTRATIVE | Facility: HOSPITAL | Age: 59
End: 2024-10-21
Payer: COMMERCIAL

## 2025-01-08 RX ORDER — PANTOPRAZOLE SODIUM 40 MG/1
40 TABLET, DELAYED RELEASE ORAL DAILY
Qty: 90 TABLET | Refills: 3 | Status: SHIPPED | OUTPATIENT
Start: 2025-01-08

## 2025-01-08 NOTE — TELEPHONE ENCOUNTER
No care due was identified.  [facility] Embedded Care Due Messages. Reference number: [id number].   1/08/2025 8:08:30 AM CST

## 2025-01-08 NOTE — TELEPHONE ENCOUNTER
Solomon Joseph  is requesting a refill authorization.  Brief Assessment and Rationale for Refill:  Approve     Medication Therapy Plan:         Comments:     Note composed:8:39 AM 01/08/2025

## 2025-05-21 DIAGNOSIS — I48.0 PAROXYSMAL ATRIAL FIBRILLATION: ICD-10-CM

## 2025-05-21 RX ORDER — METOPROLOL TARTRATE 25 MG/1
25 TABLET, FILM COATED ORAL 2 TIMES DAILY
Qty: 60 TABLET | Refills: 3 | Status: SHIPPED | OUTPATIENT
Start: 2025-05-21

## 2025-06-03 DIAGNOSIS — I25.10 CORONARY ARTERY DISEASE, UNSPECIFIED VESSEL OR LESION TYPE, UNSPECIFIED WHETHER ANGINA PRESENT, UNSPECIFIED WHETHER NATIVE OR TRANSPLANTED HEART: ICD-10-CM

## 2025-06-03 RX ORDER — TICAGRELOR 90 MG/1
TABLET, FILM COATED ORAL
Qty: 60 TABLET | Refills: 11 | Status: SHIPPED | OUTPATIENT
Start: 2025-06-03

## 2025-06-19 ENCOUNTER — TELEPHONE (OUTPATIENT)
Dept: CARDIOLOGY | Facility: CLINIC | Age: 60
End: 2025-06-19
Payer: COMMERCIAL

## 2025-06-19 DIAGNOSIS — I25.118 CORONARY ARTERY DISEASE OF NATIVE ARTERY OF NATIVE HEART WITH STABLE ANGINA PECTORIS: ICD-10-CM

## 2025-06-19 DIAGNOSIS — I48.0 PAROXYSMAL ATRIAL FIBRILLATION: Primary | ICD-10-CM

## 2025-06-19 NOTE — TELEPHONE ENCOUNTER
Instructed pt to go to ER if discomfort persisit- last seen 8/2024- made visit tomorrow with Connie- delmar ULLOA        Copied from CRM #[id number]. Topic: General Inquiry - Patient Advice  >> Jun 19, 2025 10:26 AM [place] wrote:  ..Type:  Patient Requesting Call    Who Called: pt   Does the patient know what this is regarding?: pt is requesting to speak with staff, reports having questions about discomfort in chest cavitity  Would the patient rather a call back or a response via [facility]?  Call   Best Call Back Number: .[phone] (home)  Additional Information:

## 2025-06-20 ENCOUNTER — HOSPITAL ENCOUNTER (OUTPATIENT)
Dept: CARDIOLOGY | Facility: HOSPITAL | Age: 60
Discharge: HOME | End: 2025-06-20
Payer: COMMERCIAL

## 2025-06-20 ENCOUNTER — OFFICE VISIT (OUTPATIENT)
Dept: CARDIOLOGY | Facility: CLINIC | Age: 60
End: 2025-06-20
Payer: COMMERCIAL

## 2025-06-20 VITALS
BODY MASS INDEX: 26.89 KG/M2 | DIASTOLIC BLOOD PRESSURE: 70 MMHG | HEIGHT: 67 IN | SYSTOLIC BLOOD PRESSURE: 120 MMHG | OXYGEN SATURATION: 98 % | WEIGHT: 171.31 LBS | HEART RATE: 77 BPM

## 2025-06-20 DIAGNOSIS — I25.10 CORONARY ARTERY DISEASE INVOLVING NATIVE CORONARY ARTERY OF NATIVE HEART, UNSPECIFIED WHETHER ANGINA PRESENT: Primary | ICD-10-CM

## 2025-06-20 DIAGNOSIS — I48.0 PAROXYSMAL ATRIAL FIBRILLATION: ICD-10-CM

## 2025-06-20 DIAGNOSIS — R07.9 CHEST PAIN, UNSPECIFIED TYPE: ICD-10-CM

## 2025-06-20 DIAGNOSIS — I25.118 CORONARY ARTERY DISEASE OF NATIVE ARTERY OF NATIVE HEART WITH STABLE ANGINA PECTORIS: ICD-10-CM

## 2025-06-20 DIAGNOSIS — G47.33 OSA (OBSTRUCTIVE SLEEP APNEA): ICD-10-CM

## 2025-06-20 LAB
OHS QRS DURATION: 86 MS
OHS QTC CALCULATION: 430 MS

## 2025-06-20 PROCEDURE — 93005 ELECTROCARDIOGRAM TRACING: CPT

## 2025-06-20 PROCEDURE — 99999 PR PBB SHADOW E&M-EST. PATIENT-LVL III: CPT | Mod: PBBFAC,,,

## 2025-06-20 PROCEDURE — 93010 ELECTROCARDIOGRAM REPORT: CPT | Mod: ,,, | Performed by: INTERNAL MEDICINE

## 2025-06-20 NOTE — PROGRESS NOTES
Subjective:   Patient ID:  Solomon Joseph is a 60 y.o. male who presents for evaluation of chest pain. His current medical conditions include PAF, CAD, and CHERISE      8/8/2024  HA DLAD RESTENTING IN 11/2023   4 months ago he had heart burn was drinking coffee and rtea had palpitation was evaluated in er all ok.  Now he is compliant with diet works out twice daily no new issues cardiac wise . he is not taking any heart burn medication. He cannot tolerate cpap mask.     6/20/2025  Patient presents for follow up due to some chest pressure. He is having an issue with constipation that leads to some heartburn and belching. Accompanied with this, he feels some chest pressure. He can have this pain even when he is not dealing with constipation. He is still working out and walking several times a week with no limitation there. He does have chest pain after eating, but the pressure he feels is different. His blood pressures are well controlled at home. He was diagnosed with sleep apnea, but he was unable to tolerate his CPAP. He does often wake up at night feeling like he needs to take a deep breath.   He denies all other symptoms including dyspnea, palpitations, pre-syncope, syncope, orthopnea, PND and leg swelling.       Past Medical History:   Diagnosis Date    Coronary artery disease     History of colonic polyps     Hypertension        Past Surgical History:   Procedure Laterality Date    CLOSURE DEVICE Right 11/3/2023    Procedure: Placement of Closure Device;  Surgeon: Bruno Del Toro MD;  Location: [facility] CATH LAB;  Service: Cardiology;  Laterality: Right;    CORONARY STENT PLACEMENT      CORONARY STENT PLACEMENT N/A 4/4/2023    Procedure: INSERTION, STENT, CORONARY ARTERY;  Surgeon: Glen Lemus MD;  Location: [facility] CATH LAB;  Service: Cardiology;  Laterality: N/A;    CORONARY STENT PLACEMENT N/A 11/3/2023    Procedure: INSERTION, STENT, CORONARY ARTERY;  Surgeon: Bruno Del Toro MD;  Location: [facility] CATH  LAB;  Service: Cardiology;  Laterality: N/A;    INSTANTANEOUS WAVE-FREE RATIO (IFR) N/A 4/4/2023    Procedure: Instantaneous Wave-Free Ratio (IFR);  Surgeon: Glen Lemus MD;  Location: [facility] CATH LAB;  Service: Cardiology;  Laterality: N/A;    IVUS, CORONARY  4/4/2023    Procedure: IVUS, Coronary;  Surgeon: Glen Lemus MD;  Location: [facility] CATH LAB;  Service: Cardiology;;    LEFT HEART CATHETERIZATION Left 4/4/2023    Procedure: Left heart cath;  Surgeon: Glen Lemus MD;  Location: [facility] CATH LAB;  Service: Cardiology;  Laterality: Left;    LEFT HEART CATHETERIZATION Left 11/3/2023    Procedure: Left heart cath;  Surgeon: Bruno Del Toro MD;  Location: [facility] CATH LAB;  Service: Cardiology;  Laterality: Left;    PERCUTANEOUS CORONARY INTERVENTION, ARTERY N/A 4/4/2023    Procedure: Percutaneous coronary intervention;  Surgeon: Glen Lemus MD;  Location: [facility] CATH LAB;  Service: Cardiology;  Laterality: N/A;    PERCUTANEOUS CORONARY INTERVENTION, ARTERY N/A 11/3/2023    Procedure: Percutaneous coronary intervention;  Surgeon: Bruno Del Toro MD;  Location: [facility] CATH LAB;  Service: Cardiology;  Laterality: N/A;       Social History[1]    Family History   Problem Relation Name Age of Onset    Heart disease Father         Wt Readings from Last 3 Encounters:   06/20/25 77.7 kg (171 lb 4.8 oz)   10/16/24 78.9 kg (174 lb)   08/08/24 81.4 kg (179 lb 5.5 oz)     Temp Readings from Last 3 Encounters:   10/16/24 96.1 °F (35.6 °C) (Tympanic)   11/03/23 97.9 °F (36.6 °C)   10/29/23 98 °F (36.7 °C) (Oral)     BP Readings from Last 3 Encounters:   06/20/25 120/70   10/16/24 138/86   08/08/24 126/80     Pulse Readings from Last 3 Encounters:   06/20/25 77   10/16/24 64   08/08/24 80       Medications Ordered Prior to Encounter[2]    No cardiac monitor results found for the past 12 months    Results for orders placed during the hospital encounter of 10/28/23    Echo    Interpretation Summary    Left Ventricle:  The left ventricle is normal in size. Normal wall thickness. There is concentric remodeling. Normal wall motion. There is normal systolic function with a visually estimated ejection fraction of 60 - 65%. There is normal diastolic function.    Right Ventricle: Normal right ventricular cavity size. Wall thickness is normal. Right ventricle wall motion  is normal. Systolic function is normal.    Pulmonary Artery: The estimated pulmonary artery systolic pressure is 18 mmHg.    IVC/SVC: Normal venous pressure at 3 mmHg.    Results for orders placed during the hospital encounter of 05/08/23    Nuclear Stress - Cardiology Interpreted    Interpretation Summary    Normal myocardial perfusion scan. There is no evidence of myocardial ischemia or infarction.    The gated perfusion images showed an ejection fraction of 61% at rest. The gated perfusion images showed an ejection fraction of 67% post stress.    The ECG portion of the study is suspicious for ischemia.    The patient reported no chest pain during the stress test.    There were no arrhythmias during stress.    The patient exercised for 11 minutes 15 seconds on a Tyler protocol, corresponding to a functional capacity of 13 METS, achieving a peak heart rate of 148 bpm, which is 90 % of the age predicted maximum heart rate.        Results for orders placed or performed in visit on 08/08/24   EKG 12-lead    Collection Time: 08/08/24  5:39 PM   Result Value Ref Range    QRS Duration 84 ms    [facility] QTC Calculation 433 ms    Narrative    Test Reason : I25.10,    Vent. Rate : 072 BPM     Atrial Rate : 072 BPM     P-R Int : 118 ms          QRS Dur : 084 ms      QT Int : 396 ms       P-R-T Axes : 007 036 005 degrees     QTc Int : 433 ms    Normal sinus rhythm  Normal ECG  When compared with ECG of 03-NOV-2023 10:25,  No significant change was found  Confirmed by Enzo Daily MD ([id number]) on 8/10/2024 8:33:57 AM    Referred By:             Confirmed By:Enzo Daily MD          Review of Systems   Constitutional: Negative.   HENT: Negative.     Eyes: Negative.    Cardiovascular:  Positive for chest pain and palpitations. Negative for dyspnea on exertion, leg swelling, near-syncope, orthopnea, paroxysmal nocturnal dyspnea and syncope.   Respiratory: Negative.     Skin: Negative.    Musculoskeletal: Negative.    Gastrointestinal:  Positive for constipation, flatus and heartburn.   Genitourinary: Negative.    Neurological: Negative.    Psychiatric/Behavioral: Negative.           Physical Exam  Vitals and nursing note reviewed.   Constitutional:       Appearance: Normal appearance.   HENT:      Head: Normocephalic and atraumatic.   Eyes:      General:         Right eye: No discharge.         Left eye: No discharge.      Pupils: Pupils are equal, round, and reactive to light.   Cardiovascular:      Rate and Rhythm: Normal rate and regular rhythm.      Heart sounds: S1 normal and S2 normal. No murmur heard.     No friction rub.   Pulmonary:      Effort: Pulmonary effort is normal. No respiratory distress.      Breath sounds: Normal breath sounds. No rales.   Abdominal:      Palpations: Abdomen is soft.      Tenderness: There is no abdominal tenderness.   Musculoskeletal:      Cervical back: Neck supple.      Right lower leg: No edema.      Left lower leg: No edema.   Skin:     General: Skin is warm and dry.   Neurological:      General: No focal deficit present.      Mental Status: He is alert and oriented to person, place, and time.   Psychiatric:         Mood and Affect: Mood normal.         Behavior: Behavior normal.         Thought Content: Thought content normal.         Lab Results   Component Value Date    CHOL 123 08/20/2024    CHOL 136 10/28/2023    CHOL 175 04/05/2023     Lab Results   Component Value Date    HDL 42 08/20/2024    HDL 48 10/28/2023    HDL 38 (L) 04/05/2023     Lab Results   Component Value Date    LDLCALC 61.4 (L) 08/20/2024    LDLCALC 71.6 10/28/2023    LDLCALC  102.4 04/05/2023     Lab Results   Component Value Date    TRIG 98 08/20/2024    TRIG 82 10/28/2023    TRIG 173 (H) 04/05/2023     Lab Results   Component Value Date    CHOLHDL 34.1 08/20/2024    CHOLHDL 35.3 10/28/2023    CHOLHDL 21.7 04/05/2023       Chemistry        Component Value Date/Time     08/20/2024 0812    K 4.0 08/20/2024 0812     08/20/2024 0812    CO2 29 08/20/2024 0812    BUN 17 08/20/2024 0812    CREATININE 0.9 08/20/2024 0812    GLU 93 08/20/2024 0812        Component Value Date/Time    CALCIUM 9.3 08/20/2024 0812    ALKPHOS 65 08/20/2024 0812    AST 23 08/20/2024 0812    ALT 27 08/20/2024 0812    BILITOT 0.6 08/20/2024 0812    ESTGFRAFRICA >60 04/29/2018 2231    EGFRNONAA >60 04/29/2018 2231          Lab Results   Component Value Date    TSH 1.543 04/04/2023     Lab Results   Component Value Date    INR 1.0 11/02/2023    INR 1.0 04/04/2023    INR 1.1 08/29/2015     Lab Results   Component Value Date    WBC 6.61 10/29/2023    HGB 15.7 10/29/2023    HCT 45.5 10/29/2023    MCV 87 10/29/2023     10/29/2023       Assessment:      1. Coronary artery disease involving native coronary artery of native heart, unspecified whether angina present    2. Paroxysmal atrial fibrillation    3. CHERISE (obstructive sleep apnea)    4. Chest pain, unspecified type        Plan:   Coronary artery disease involving native coronary artery of native heart, unspecified whether angina present  -     Nuclear Stress - Cardiology Interpreted; Future    Paroxysmal atrial fibrillation    CHERISE (obstructive sleep apnea)    Chest pain, unspecified type  -     Nuclear Stress - Cardiology Interpreted; Future      Nuclear stress test - chest pain/CAD  Continue asa, statin, metoprolol, brilinta - CAD  [facility]             [1]   Social History  Tobacco Use    Smoking status: Never    Smokeless tobacco: Never   Substance Use Topics    Alcohol use: No    Drug use:  No   [2]   Current Outpatient Medications on File Prior to Visit   Medication Sig Dispense Refill    aspirin 81 MG Chew Take 81 mg by mouth once daily.      atorvastatin (LIPITOR) 80 MG tablet TAKE 1/2 TABLET(40 MG) BY MOUTH EVERY DAY 15 tablet 11    metoprolol tartrate (LOPRESSOR) 25 MG tablet TAKE 1 TABLET(25 MG) BY MOUTH TWICE DAILY 60 tablet 3    pantoprazole (PROTONIX) 40 MG tablet Take 1 tablet (40 mg total) by mouth once daily. 90 tablet 3    saw palmetto 500 MG capsule Take 500 mg by mouth once daily.      ticagrelor (BRILINTA) 90 mg tablet TAKE 1 TABLET(90 MG) BY MOUTH TWICE DAILY 60 tablet 11     Current Facility-Administered Medications on File Prior to Visit   Medication Dose Route Frequency Provider Last Rate Last Admin    regadenoson injection 0.4 mg  0.4 mg Intravenous Once Bea Adames, NP

## 2025-06-21 ENCOUNTER — PATIENT MESSAGE (OUTPATIENT)
Dept: CARDIOLOGY | Facility: HOSPITAL | Age: 60
End: 2025-06-21
Payer: COMMERCIAL

## 2025-07-01 ENCOUNTER — TELEPHONE (OUTPATIENT)
Dept: PHARMACY | Facility: CLINIC | Age: 60
End: 2025-07-01
Payer: COMMERCIAL

## 2025-07-01 NOTE — TELEPHONE ENCOUNTER
[facility]Population [facility] Chart Review & Patient Outreach Details For Medication Adherence Project    Reason for Outreach Encounter: 3rd Party payor non-compliance report ([employer], [employer], C, etc)  2.  Patient Outreach Method: Reviewed patient chart   3.   Medication in question:    Hyperlipidemia Medications              atorvastatin (LIPITOR) 80 MG tablet TAKE 1/2 TABLET(40 MG) BY MOUTH EVERY DAY                  atorvastatin   last filled  3/15/25 for 90 day supply    4.  Reviewed and or Updates Made To: Patient Chart  5. Outreach Outcomes and/or actions taken: Patient reminded to  prescription  Additional Notes:

## 2025-07-22 DIAGNOSIS — I25.10 CORONARY ARTERY DISEASE, UNSPECIFIED VESSEL OR LESION TYPE, UNSPECIFIED WHETHER ANGINA PRESENT, UNSPECIFIED WHETHER NATIVE OR TRANSPLANTED HEART: ICD-10-CM

## 2025-07-22 RX ORDER — ATORVASTATIN CALCIUM 80 MG/1
TABLET, FILM COATED ORAL
Qty: 15 TABLET | Refills: 11 | Status: SHIPPED | OUTPATIENT
Start: 2025-07-22

## 2025-08-04 ENCOUNTER — TELEPHONE (OUTPATIENT)
Dept: PHARMACY | Facility: CLINIC | Age: 60
End: 2025-08-04
Payer: COMMERCIAL

## 2025-08-04 NOTE — TELEPHONE ENCOUNTER
[facility] Chart Review & Patient Outreach Details For Medication Adherence Project    Reason for Outreach Encounter: 3rd Party payor non-compliance report ([employer], [employer], C, etc)  2.  Patient Outreach Method: Reviewed Patient Chart  3.   Medication in question: Atorvastatin 80mg   LF 90 ds 7/18/25   Hyperlipidemia Medications              atorvastatin (LIPITOR) 80 MG tablet TAKE 1/2 TABLET(40 MG) BY MOUTH EVERY DAY               4.  Reviewed and or Updates Made To: Patient Chart  5. Outreach Outcomes and/or actions taken: Patient filled medication and is on track to be adherent

## (undated) DEVICE — CATH JR4 5FR

## (undated) DEVICE — KIT GLIDESHEATH SLEND 6FR 10CM

## (undated) DEVICE — DRAPE ANGIO BRACH 38X44IN

## (undated) DEVICE — PACK ANGIOPLASTY ACCESS PLUS

## (undated) DEVICE — GUIDE LAUNCHER 6FR EBU 3.5

## (undated) DEVICE — CATH NC EMERGE MR 3X12MM

## (undated) DEVICE — CATH OPTITORQUE RADIAL 5FR

## (undated) DEVICE — CATH IMPULSE 6FR MULTI-PAK

## (undated) DEVICE — KIT WATCHDOG HEMSTAS VALVE 8FR

## (undated) DEVICE — CATH JL3.5 5FR

## (undated) DEVICE — CATH NC EMERGE MR 3X8MM

## (undated) DEVICE — PACK HEART CATH BR

## (undated) DEVICE — WIRE X-SUP CHOICE PT .014X182

## (undated) DEVICE — CATH EMERGE MR 20 X 2.50

## (undated) DEVICE — SUT PERCLOSE PROSTYLE MEDIATE

## (undated) DEVICE — GUIDEWIRE EMERALD .035IN 260CM

## (undated) DEVICE — CATH INFINITI MULTIPAK JR4 5FR

## (undated) DEVICE — SHEATH INTRODUCER 6FR 11CM

## (undated) DEVICE — ANGIOTOUCH KIT

## (undated) DEVICE — KIT SYR REUSABLE

## (undated) DEVICE — CATH DIAG IMPULSE 6FR FR4

## (undated) DEVICE — GUIDEWIRE WHOLEY HI TORQ 175CM

## (undated) DEVICE — WIRE PTCE CHOICE PT .014X182

## (undated) DEVICE — INFLATOR ENCORE 26 BLLN INFL

## (undated) DEVICE — CATH PIG145 INFINITI 5X110CM

## (undated) DEVICE — CATH REVOLUTION IVUS 45MHZ

## (undated) DEVICE — OMNIPAQUE 300MG 150ML VIAL

## (undated) DEVICE — BAND TR COMP DEVICE REG 24CM

## (undated) DEVICE — GUIDEWIRE OMNI J TIP 185CM

## (undated) DEVICE — KIT INTRODUCER STIFFEN MICRO

## (undated) DEVICE — KIT MANIFOLD LOW PRESS TUBING

## (undated) DEVICE — CATH JL4 5FR

## (undated) DEVICE — CATH DIAG IMPULSE 6FR FL4

## (undated) DEVICE — CATH IMPULSE PIGTAIL 6F 110CM

## (undated) DEVICE — CATH INFINITI 4F 3DRC 100CM